# Patient Record
Sex: FEMALE | Race: OTHER | NOT HISPANIC OR LATINO
[De-identification: names, ages, dates, MRNs, and addresses within clinical notes are randomized per-mention and may not be internally consistent; named-entity substitution may affect disease eponyms.]

---

## 2023-05-18 ENCOUNTER — NON-APPOINTMENT (OUTPATIENT)
Age: 83
End: 2023-05-18

## 2023-05-18 ENCOUNTER — APPOINTMENT (OUTPATIENT)
Dept: OPHTHALMOLOGY | Facility: CLINIC | Age: 83
End: 2023-05-18
Payer: MEDICARE

## 2023-05-18 PROCEDURE — 92004 COMPRE OPH EXAM NEW PT 1/>: CPT

## 2023-06-06 ENCOUNTER — INPATIENT (INPATIENT)
Facility: HOSPITAL | Age: 83
LOS: 15 days | Discharge: HOME HEALTH | End: 2023-06-22
Attending: HOSPITALIST | Admitting: HOSPITALIST
Payer: MEDICARE

## 2023-06-06 VITALS
RESPIRATION RATE: 20 BRPM | SYSTOLIC BLOOD PRESSURE: 195 MMHG | TEMPERATURE: 98 F | DIASTOLIC BLOOD PRESSURE: 84 MMHG | HEART RATE: 85 BPM | OXYGEN SATURATION: 100 %

## 2023-06-06 DIAGNOSIS — Z90.49 ACQUIRED ABSENCE OF OTHER SPECIFIED PARTS OF DIGESTIVE TRACT: Chronic | ICD-10-CM

## 2023-06-06 DIAGNOSIS — N19 UNSPECIFIED KIDNEY FAILURE: ICD-10-CM

## 2023-06-06 DIAGNOSIS — Z90.710 ACQUIRED ABSENCE OF BOTH CERVIX AND UTERUS: Chronic | ICD-10-CM

## 2023-06-06 DIAGNOSIS — N17.9 ACUTE KIDNEY FAILURE, UNSPECIFIED: ICD-10-CM

## 2023-06-06 LAB
ALBUMIN SERPL ELPH-MCNC: 3.7 G/DL — SIGNIFICANT CHANGE UP (ref 3.3–5)
ALP SERPL-CCNC: 73 U/L — SIGNIFICANT CHANGE UP (ref 40–120)
ALT FLD-CCNC: 5 U/L — SIGNIFICANT CHANGE UP (ref 4–33)
ANION GAP SERPL CALC-SCNC: 23 MMOL/L — HIGH (ref 7–14)
ANION GAP SERPL CALC-SCNC: 24 MMOL/L — HIGH (ref 7–14)
AST SERPL-CCNC: 14 U/L — SIGNIFICANT CHANGE UP (ref 4–32)
BASE EXCESS BLDV CALC-SCNC: -10.3 MMOL/L — LOW (ref -2–3)
BASE EXCESS BLDV CALC-SCNC: -10.9 MMOL/L — LOW (ref -2–3)
BASOPHILS # BLD AUTO: 0.05 K/UL — SIGNIFICANT CHANGE UP (ref 0–0.2)
BASOPHILS NFR BLD AUTO: 0.5 % — SIGNIFICANT CHANGE UP (ref 0–2)
BILIRUB SERPL-MCNC: <0.2 MG/DL — SIGNIFICANT CHANGE UP (ref 0.2–1.2)
BLOOD GAS VENOUS COMPREHENSIVE RESULT: SIGNIFICANT CHANGE UP
BLOOD GAS VENOUS COMPREHENSIVE RESULT: SIGNIFICANT CHANGE UP
BUN SERPL-MCNC: 83 MG/DL — HIGH (ref 7–23)
BUN SERPL-MCNC: 83 MG/DL — HIGH (ref 7–23)
CALCIUM SERPL-MCNC: 8.5 MG/DL — SIGNIFICANT CHANGE UP (ref 8.4–10.5)
CALCIUM SERPL-MCNC: 8.9 MG/DL — SIGNIFICANT CHANGE UP (ref 8.4–10.5)
CHLORIDE BLDV-SCNC: 103 MMOL/L — SIGNIFICANT CHANGE UP (ref 96–108)
CHLORIDE BLDV-SCNC: 106 MMOL/L — SIGNIFICANT CHANGE UP (ref 96–108)
CHLORIDE SERPL-SCNC: 101 MMOL/L — SIGNIFICANT CHANGE UP (ref 98–107)
CHLORIDE SERPL-SCNC: 102 MMOL/L — SIGNIFICANT CHANGE UP (ref 98–107)
CO2 BLDV-SCNC: 18.5 MMOL/L — LOW (ref 22–26)
CO2 BLDV-SCNC: 19 MMOL/L — LOW (ref 22–26)
CO2 SERPL-SCNC: 14 MMOL/L — LOW (ref 22–31)
CO2 SERPL-SCNC: 15 MMOL/L — LOW (ref 22–31)
CREAT SERPL-MCNC: 16.24 MG/DL — HIGH (ref 0.5–1.3)
CREAT SERPL-MCNC: 16.77 MG/DL — HIGH (ref 0.5–1.3)
EGFR: 2 ML/MIN/1.73M2 — LOW
EGFR: 2 ML/MIN/1.73M2 — LOW
EOSINOPHIL # BLD AUTO: 0.19 K/UL — SIGNIFICANT CHANGE UP (ref 0–0.5)
EOSINOPHIL NFR BLD AUTO: 2 % — SIGNIFICANT CHANGE UP (ref 0–6)
GAS PNL BLDV: 135 MMOL/L — LOW (ref 136–145)
GAS PNL BLDV: 138 MMOL/L — SIGNIFICANT CHANGE UP (ref 136–145)
GAS PNL BLDV: SIGNIFICANT CHANGE UP
GLUCOSE BLDV-MCNC: 92 MG/DL — SIGNIFICANT CHANGE UP (ref 70–99)
GLUCOSE BLDV-MCNC: 97 MG/DL — SIGNIFICANT CHANGE UP (ref 70–99)
GLUCOSE SERPL-MCNC: 95 MG/DL — SIGNIFICANT CHANGE UP (ref 70–99)
GLUCOSE SERPL-MCNC: 98 MG/DL — SIGNIFICANT CHANGE UP (ref 70–99)
HCO3 BLDV-SCNC: 17 MMOL/L — LOW (ref 22–29)
HCO3 BLDV-SCNC: 18 MMOL/L — LOW (ref 22–29)
HCT VFR BLD CALC: 29.9 % — LOW (ref 34.5–45)
HCT VFR BLD CALC: 31.6 % — LOW (ref 34.5–45)
HCT VFR BLDA CALC: 31 % — LOW (ref 34.5–46.5)
HCT VFR BLDA CALC: 41 % — SIGNIFICANT CHANGE UP (ref 34.5–46.5)
HGB BLD CALC-MCNC: 10.4 G/DL — LOW (ref 11.7–16.1)
HGB BLD CALC-MCNC: 13.8 G/DL — SIGNIFICANT CHANGE UP (ref 11.7–16.1)
HGB BLD-MCNC: 10 G/DL — LOW (ref 11.5–15.5)
HGB BLD-MCNC: 10.3 G/DL — LOW (ref 11.5–15.5)
IANC: 6.97 K/UL — SIGNIFICANT CHANGE UP (ref 1.8–7.4)
IMM GRANULOCYTES NFR BLD AUTO: 0.6 % — SIGNIFICANT CHANGE UP (ref 0–0.9)
LACTATE BLDV-MCNC: 1 MMOL/L — SIGNIFICANT CHANGE UP (ref 0.5–2)
LACTATE BLDV-MCNC: 1.1 MMOL/L — SIGNIFICANT CHANGE UP (ref 0.5–2)
LIDOCAIN IGE QN: 870 U/L — HIGH (ref 7–60)
LYMPHOCYTES # BLD AUTO: 1.63 K/UL — SIGNIFICANT CHANGE UP (ref 1–3.3)
LYMPHOCYTES # BLD AUTO: 16.8 % — SIGNIFICANT CHANGE UP (ref 13–44)
MAGNESIUM SERPL-MCNC: 2.5 MG/DL — SIGNIFICANT CHANGE UP (ref 1.6–2.6)
MCHC RBC-ENTMCNC: 29.8 PG — SIGNIFICANT CHANGE UP (ref 27–34)
MCHC RBC-ENTMCNC: 30.4 PG — SIGNIFICANT CHANGE UP (ref 27–34)
MCHC RBC-ENTMCNC: 32.6 GM/DL — SIGNIFICANT CHANGE UP (ref 32–36)
MCHC RBC-ENTMCNC: 33.4 GM/DL — SIGNIFICANT CHANGE UP (ref 32–36)
MCV RBC AUTO: 90.9 FL — SIGNIFICANT CHANGE UP (ref 80–100)
MCV RBC AUTO: 91.3 FL — SIGNIFICANT CHANGE UP (ref 80–100)
MONOCYTES # BLD AUTO: 0.82 K/UL — SIGNIFICANT CHANGE UP (ref 0–0.9)
MONOCYTES NFR BLD AUTO: 8.4 % — SIGNIFICANT CHANGE UP (ref 2–14)
NEUTROPHILS # BLD AUTO: 6.97 K/UL — SIGNIFICANT CHANGE UP (ref 1.8–7.4)
NEUTROPHILS NFR BLD AUTO: 71.7 % — SIGNIFICANT CHANGE UP (ref 43–77)
NRBC # BLD: 0 /100 WBCS — SIGNIFICANT CHANGE UP (ref 0–0)
NRBC # BLD: 0 /100 WBCS — SIGNIFICANT CHANGE UP (ref 0–0)
NRBC # FLD: 0 K/UL — SIGNIFICANT CHANGE UP (ref 0–0)
NRBC # FLD: 0 K/UL — SIGNIFICANT CHANGE UP (ref 0–0)
OB PNL STL: POSITIVE
PCO2 BLDV: 43 MMHG — SIGNIFICANT CHANGE UP (ref 39–52)
PCO2 BLDV: 49 MMHG — SIGNIFICANT CHANGE UP (ref 39–52)
PH BLDV: 7.16 — LOW (ref 7.32–7.43)
PH BLDV: 7.21 — LOW (ref 7.32–7.43)
PHOSPHATE SERPL-MCNC: 9 MG/DL — HIGH (ref 2.5–4.5)
PLATELET # BLD AUTO: 245 K/UL — SIGNIFICANT CHANGE UP (ref 150–400)
PLATELET # BLD AUTO: 284 K/UL — SIGNIFICANT CHANGE UP (ref 150–400)
PO2 BLDV: 39 MMHG — SIGNIFICANT CHANGE UP (ref 25–45)
PO2 BLDV: 43 MMHG — SIGNIFICANT CHANGE UP (ref 25–45)
POTASSIUM BLDV-SCNC: 4.8 MMOL/L — SIGNIFICANT CHANGE UP (ref 3.5–5.1)
POTASSIUM BLDV-SCNC: 4.9 MMOL/L — SIGNIFICANT CHANGE UP (ref 3.5–5.1)
POTASSIUM SERPL-MCNC: 5 MMOL/L — SIGNIFICANT CHANGE UP (ref 3.5–5.3)
POTASSIUM SERPL-MCNC: 5.1 MMOL/L — SIGNIFICANT CHANGE UP (ref 3.5–5.3)
POTASSIUM SERPL-SCNC: 5 MMOL/L — SIGNIFICANT CHANGE UP (ref 3.5–5.3)
POTASSIUM SERPL-SCNC: 5.1 MMOL/L — SIGNIFICANT CHANGE UP (ref 3.5–5.3)
PROT SERPL-MCNC: 6.6 G/DL — SIGNIFICANT CHANGE UP (ref 6–8.3)
RBC # BLD: 3.29 M/UL — LOW (ref 3.8–5.2)
RBC # BLD: 3.46 M/UL — LOW (ref 3.8–5.2)
RBC # FLD: 11.9 % — SIGNIFICANT CHANGE UP (ref 10.3–14.5)
RBC # FLD: 12 % — SIGNIFICANT CHANGE UP (ref 10.3–14.5)
SAO2 % BLDV: 55.2 % — LOW (ref 67–88)
SAO2 % BLDV: 69.4 % — SIGNIFICANT CHANGE UP (ref 67–88)
SODIUM SERPL-SCNC: 139 MMOL/L — SIGNIFICANT CHANGE UP (ref 135–145)
SODIUM SERPL-SCNC: 140 MMOL/L — SIGNIFICANT CHANGE UP (ref 135–145)
WBC # BLD: 10.61 K/UL — HIGH (ref 3.8–10.5)
WBC # BLD: 9.72 K/UL — SIGNIFICANT CHANGE UP (ref 3.8–10.5)
WBC # FLD AUTO: 10.61 K/UL — HIGH (ref 3.8–10.5)
WBC # FLD AUTO: 9.72 K/UL — SIGNIFICANT CHANGE UP (ref 3.8–10.5)

## 2023-06-06 PROCEDURE — 99285 EMERGENCY DEPT VISIT HI MDM: CPT

## 2023-06-06 PROCEDURE — 99223 1ST HOSP IP/OBS HIGH 75: CPT

## 2023-06-06 PROCEDURE — 74176 CT ABD & PELVIS W/O CONTRAST: CPT | Mod: 26

## 2023-06-06 PROCEDURE — 99497 ADVNCD CARE PLAN 30 MIN: CPT | Mod: 25

## 2023-06-06 RX ORDER — SODIUM CHLORIDE 9 MG/ML
1000 INJECTION INTRAMUSCULAR; INTRAVENOUS; SUBCUTANEOUS ONCE
Refills: 0 | Status: DISCONTINUED | OUTPATIENT
Start: 2023-06-06 | End: 2023-06-06

## 2023-06-06 RX ORDER — SODIUM BICARBONATE 1 MEQ/ML
0.12 SYRINGE (ML) INTRAVENOUS
Qty: 150 | Refills: 0 | Status: DISCONTINUED | OUTPATIENT
Start: 2023-06-06 | End: 2023-06-08

## 2023-06-06 RX ORDER — ACETAMINOPHEN 500 MG
650 TABLET ORAL EVERY 6 HOURS
Refills: 0 | Status: DISCONTINUED | OUTPATIENT
Start: 2023-06-06 | End: 2023-06-22

## 2023-06-06 RX ORDER — SODIUM CHLORIDE 9 MG/ML
1000 INJECTION INTRAMUSCULAR; INTRAVENOUS; SUBCUTANEOUS ONCE
Refills: 0 | Status: COMPLETED | OUTPATIENT
Start: 2023-06-06 | End: 2023-06-06

## 2023-06-06 RX ORDER — ONDANSETRON 8 MG/1
4 TABLET, FILM COATED ORAL EVERY 6 HOURS
Refills: 0 | Status: DISCONTINUED | OUTPATIENT
Start: 2023-06-06 | End: 2023-06-22

## 2023-06-06 RX ORDER — ONDANSETRON 8 MG/1
4 TABLET, FILM COATED ORAL ONCE
Refills: 0 | Status: COMPLETED | OUTPATIENT
Start: 2023-06-06 | End: 2023-06-06

## 2023-06-06 RX ORDER — PANTOPRAZOLE SODIUM 20 MG/1
80 TABLET, DELAYED RELEASE ORAL ONCE
Refills: 0 | Status: COMPLETED | OUTPATIENT
Start: 2023-06-06 | End: 2023-06-06

## 2023-06-06 RX ADMIN — Medication 75 MEQ/KG/HR: at 20:34

## 2023-06-06 RX ADMIN — SODIUM CHLORIDE 1000 MILLILITER(S): 9 INJECTION INTRAMUSCULAR; INTRAVENOUS; SUBCUTANEOUS at 16:28

## 2023-06-06 RX ADMIN — ONDANSETRON 4 MILLIGRAM(S): 8 TABLET, FILM COATED ORAL at 16:28

## 2023-06-06 RX ADMIN — PANTOPRAZOLE SODIUM 80 MILLIGRAM(S): 20 TABLET, DELAYED RELEASE ORAL at 22:49

## 2023-06-06 NOTE — H&P ADULT - PROBLEM SELECTOR PLAN 5
Hgb 10.3 initially, 10 on repeat. Likely multifactorial, including from possible GIB, anemia of chronic disease, and severe renal failure.  - Plan as above for GIB  - Check iron studies, folate, vitamin B12, retic count, LDH, and haptoglobin Hgb 10.3 initially, 10 on repeat. Likely multifactorial, including from possible GIB, anemia of chronic disease, and severe renal failure.  - Plan as above for GIB  - Check iron studies, folate, vitamin B12, retic count, LDH, and haptoglobin  - Pt reporting new ALLEN, suspect 2/2 anemia. Pt not grossly fluid overloaded on exam despite severe renal failure. Will check TTE to evaluate for structural heart failure.

## 2023-06-06 NOTE — ED PROVIDER NOTE - CARE PLAN
1 Principal Discharge DX:	Renal failure, acute  Secondary Diagnosis:	GI bleed  Secondary Diagnosis:	Anemia

## 2023-06-06 NOTE — CONSULT NOTE ADULT - PROBLEM SELECTOR RECOMMENDATION 9
Pt with advanced kidney failure in setting of GI fluid losses (diarrhea), decreased PO intake, meds (Valsartan-HCTZ) and chronic NSAIDs use. Exact duration and etiology of kidney failure remains unknown. Pt presented with melanotic stool/diarrhea past 1 week. SCr significantly elevated at 16.77 on ER labs. No prior labs available to review. No urine studies available. Baca placed in ER with no urine return noted. Pt with hx of chronic NSAIDs use. Pt likely with ?advanced CKD. Recommend check CT scan without contrast to rule out obstructive uropathy/reversible cause. Recommend obtain UA, urine lytes and UPCR. Check kidney and bladder US with renal dopplers to rule out BENJAMÍN. Pt also noted to be hypertensive and anemic. Check LDH, and haptoglobin. Check ALESIA, P-ANCA, C-ANCA, Anti-GBM ab, HBsAg, Hep C antibody, HIV, Parvovirus, C3, C4, SPEP, serum immunofixation, serum free light chains, anti PLA2R to rule out secondary causes of kidney failure. Pt. with uremic symptoms and significant kidney failure. Discussed at length with pt. need for RRT given clinical presentation/uremic symptoms. Pt agrees and consented for RRT/HD. HD consent obtained and kept on file. Repeat BMP. Will wait for CT scan results. Plan for initiation of HD tomorrow AM (6/7/23) if no reversible/identifiable cause noted on CT scan. Will need to establish vascular/HD access prior to HD. Continue IV bicarbonate infusion for now. Strict I/O. Avoid ACEi/ARBS/IV contrast/NSAIDs/Nephrotoxins. Dose meds as per egfr. Monitor labs.     Case discussed with on call attending. Dr. HARPAL Morejon. Pt with advanced kidney failure in setting of GI fluid losses (diarrhea), decreased PO intake, meds (Valsartan-HCTZ) and chronic NSAIDs use. Exact duration and etiology of kidney failure remains unknown. Pt presented with melanotic stool/diarrhea past 1 week. SCr significantly elevated at 16.77 on ER labs. No prior labs available to review. No urine studies available. Baca placed in ER with no urine return noted. Pt with hx of chronic NSAIDs use. Pt likely with ?advanced CKD. Recommend check CT scan without contrast to rule out obstructive uropathy/reversible cause. Recommend obtain UA, urine lytes and UPCR. Check kidney and bladder US with renal dopplers to rule out BENJAMÍN. Pt also noted to be hypertensive and anemic. Check LDH, and haptoglobin. CT scan results with bilateral hydronephrosis. Urology consult note reviewed. IR consult for PCN placement noted. Pt. with uremic symptoms and significant kidney failure. Discussed at length with pt. need for RRT given clinical presentation/uremic symptoms. Pt agrees and consented for RRT/HD. HD consent obtained and kept on file. Plan for initiation of HD tomorrow AM (6/7/23) if no reversible/identifiable cause noted on CT scan. Will need to establish vascular/HD access prior to HD. Continue IV bicarbonate infusion for now. Strict I/O. Avoid ACEi/ARBS/IV contrast/NSAIDs/Nephrotoxins. Dose meds as per egfr. Monitor labs.     Case discussed with on call attending. Dr. HARPAL Morejon.

## 2023-06-06 NOTE — H&P ADULT - NSHPSOCIALHISTORY_GEN_ALL_CORE
Drinks alcohol socially.  Smoked cigarettes for 1 year at 21 yo.  Smoked marijuana for 2 years in mid to late 20s.  Grandson lives with pt.   Ambulates without any assistance.

## 2023-06-06 NOTE — ED ADULT NURSE NOTE - OBJECTIVE STATEMENT
patient received from day RN Vahe, patient alert and oriented x4 ambulatory, c/o diarrhea and nausea. patient is well appearing, no acute distress noted. 22 IV placed by day intake ZHENG Byers. pending bed assignment at this time.

## 2023-06-06 NOTE — H&P ADULT - PROBLEM SELECTOR PLAN 6
Lipase elevated to 870 Lipase elevated to 870. CTAP noncontrast showing unremarkable pancreas. Pt without any abdominal pain or tenderness on exam. Low suspicion for acute pancreatitis at this time, more likely in setting of severe renal failure and possibly from gastroenteritis.  - S/p 2L bolus of NS in ED. Will hold off on additional IVF for now, as pt not making any urine.  - Trend lipase for now

## 2023-06-06 NOTE — H&P ADULT - PROBLEM SELECTOR PLAN 1
Serum Cr 16.77 and BUN 83. Unknown baseline renal function, as no prior labs available. Duration of renal failure also unclear, but suspect pt with history of CKD given pt's daily use of Aleve for past 10 years. Likely now with KINJAL on CKD from obstructive uropathy, GI fluid losses (diarrhea), and poor PO intake.   - Renal consulted on admission, appreciate recs  - CTAP noncontrast obtained overnight and demonstrating moderate right-sided hydroureteronephrosis, with obstruction proximal to the UVJ in the region of a 3.6 x 2.9 x 4.6 cm bulbous soft tissue mass inseparable from the cervical remnant, mild left-sided hydroureteronephrosis to the level of the UVJ, and pelvic and RP lymphadenopathy concerning for metastatic disease  - Urology consult called overnight for possible ureteral stent placement. F/u official recs.  - IR consult also ordered on admission, as pt might require nephrostomy tube placement instead  - S/p Baca catheter insertion in ED. C/w Baca catheter for now.  - Per Renal, no need for urgent HD tonight, plan for initiation of HD tomorrow. Vascular or IR consult for Shiley placement  - Serum Cr 16.77 and BUN 83. Unknown baseline renal function, as no prior labs available. Duration of renal failure also unclear, but suspect pt with history of CKD given pt's daily use of Aleve for past 10 years. Likely now with KINJAL on CKD from obstructive uropathy, GI fluid losses (diarrhea), and poor PO intake.   - Renal consulted on admission, appreciate recs  - CTAP noncontrast obtained overnight and demonstrating moderate right-sided hydroureteronephrosis, with obstruction proximal to the UVJ in the region of a 3.6 x 2.9 x 4.6 cm bulbous soft tissue mass inseparable from the cervical remnant, mild left-sided hydroureteronephrosis to the level of the UVJ, and pelvic and RP lymphadenopathy concerning for metastatic disease  - Urology consult called overnight for possible ureteral stent placement. F/u official recs.  - IR consult also ordered on admission, as pt might require nephrostomy tube placement instead  - S/p Baca catheter insertion in ED. C/w Baca catheter for now.  - Per Renal, no need for urgent HD tonight, plan for initiation of HD tomorrow. Vascular or IR consult to be called in AM for Shiley placement.   - US abdominal dopplers ordered to r/o renal artery stenosis  - UA, urine lytes, and urine protein/Cr ratio ordered, but pt currently with no urine output  - Check ALESIA, p-ANCA, c-ANCA, anti-GBM Ab, HBsAg, Hep C Ab, HIV, Parvovirus, C3, C4, SPEP, serum immunofixation, serum free light chains, and anti-PLA2R Ab  - C/w bicarb infusion as below  - Monitor serum Cr and urine output with strict I&Os  - Avoid NSAIDs, ACEi/ARBs, contrast, and nephrotoxic agents   - Renally dose meds

## 2023-06-06 NOTE — ED PROVIDER NOTE - OBJECTIVE STATEMENT
83-year-old woman with PMH HTN, HLD, hypothyroidism on Synthroid, presenting due to nausea and diarrhea since Wednesday.  Patient denies abdominal pain despite with triage note states, said that the only reason she vomited once was because she choked on the antinausea tablets that were given to her.  She went to urgent care yesterday, they examined her and told her that since they did not have a CAT scan she should present to the ER which is why she is here today.  Initially the diarrhea was dark-colored, today started appearing to be more yellow and green.  Denies fever, chest pain, shortness of breath, recent antibiotic use, travel, illness. 83-year-old woman with PMH HTN, HLD, hypothyroidism on Synthroid, presenting due to nausea and diarrhea since Wednesday.  Patient denies abdominal pain despite with triage note states, said that the only reason she vomited once was because she choked on the antinausea tablets that were given to her.  She went to urgent care yesterday, they examined her and referred her to the ER which is why she is here today.  Initially the diarrhea was dark-colored, today started appearing to be more yellow and green.  Denies fever, chest pain, shortness of breath, recent antibiotic use, travel, illness. 83-year-old woman with PMH HTN, HLD, hypothyroidism on Synthroid, presenting due to nausea and diarrhea since Wednesday.  Patient denies abdominal pain despite with triage note states, said that the only reason she vomited once was because she choked on the antinausea tablets that were given to her.  She went to urgent care yesterday, they examined her and referred her to the ER which is why she is here today.  Initially the diarrhea was dark-colored, today started appearing to be more yellow and green. Has urinary incontinence at baseline, over the last few days appears to have worsened. Denies fever, chest pain, shortness of breath, recent antibiotic use, travel, illness.

## 2023-06-06 NOTE — CONSULT NOTE ADULT - SUBJECTIVE AND OBJECTIVE BOX
[facility] DIVISION OF KIDNEY DISEASES AND HYPERTENSION -- [phone]  -- INITIAL CONSULT NOTE  --------------------------------------------------------------------------------  HPI: 83-year-old female with PMH of HTN, HLD presented to [facility] with melanotic stools and diarrhea past 1 week. ER labs showed SCr of 16.77. Nephrology consulted for kidney failure.     Pt. seen and examined in ER. Pt gives hx of HTN for many years. Pt says she follows Dr. Saha (PCP), however has not seen him for more than 4 years. No prior labs/work up available to review. Pt says she takes Aleeve everyday for knee pain for more than 10 years. No personal or family hx of kidney problems. Pt also takes diuretics  (Valsartan-HCTZ) for HTN. Pt says she has not seen any physician since pandemic and has not had any blood test.     Pt. laying comfortably, not in distress. Pt says she started noticing dark stools past ~1 week associated with intermittent nausea. Pt gives hx of poor po intake and low energy levels. Pt says she gets chills sometimes. Pt says she noticed blood occasionally after urination. Pt says she last urinated today AM and has hx of urinary incontinence. Denies CP, SOB, dysuria. SCo2 low at 14 with ph of 7.16. Pt on IV bicarbonate infusion.    PAST HISTORY  --------------------------------------------------------------------------------  PAST MEDICAL & SURGICAL HISTORY:  HTN (hypertension)  HLD (hyperlipidemia)  Hypothyroid  History of cholecystectomy  History of appendectomy  H/O: hysterectomy    FAMILY HISTORY:    PAST SOCIAL HISTORY:    ALLERGIES & MEDICATIONS  --------------------------------------------------------------------------------  Allergies    No Known Allergies    Intolerances      Standing Inpatient Medications  pantoprazole  Injectable 80 milliGRAM(s) IV Push once  sodium bicarbonate  Infusion 0.125 mEq/kG/Hr IV Continuous <Continuous>    PRN Inpatient Medications      REVIEW OF SYSTEMS  --------------------------------------------------------------------------------  Gen: No fevers/chills, weakness+  Head/Eyes/Ears: No HA  Respiratory: No dyspnea, cough  CV: No chest pain  GI: see HPI  : see HPI  MSK: No edema  Skin: No rashes  Heme/onc: Anemia, hx of skin cancer (treated)    VITALS/PHYSICAL EXAM  --------------------------------------------------------------------------------  T(C): 36.6 (06-06-23 @ 21:09), Max: 36.8 (06-06-23 @ 13:00)  HR: 82 (06-06-23 @ 21:09) (78 - 85)  BP: 141/58 (06-06-23 @ 21:09) (141/58 - 195/84)  RR: 20 (06-06-23 @ 21:09) (16 - 20)  SpO2: 100% (06-06-23 @ 21:09) (97% - 100%)  Wt(kg): --    Weight (kg): 90 (06-06-23 @ 19:11)      Physical Exam:  	Gen: elderly female, resting, NAD  	HEENT: Anicteric  	Pulm: CTA B/L  	CV: S1S2+  	Abd: Soft, +BS    	Ext: No LE edema B/L  	Neuro: Awake             : Baca+ with no urine             Skin: Warm and dry    LABS/STUDIES  --------------------------------------------------------------------------------              10.3   9.72  >-----------<  284      [06-06-23 @ 16:20]              31.6     139  |  101  |  83  ----------------------------<  95      [06-06-23 @ 16:53]  5.1   |  14  |  16.77        Ca     8.9     [06-06-23 @ 16:53]    Creatinine Trend:  SCr 16.77 [06-06 @ 16:53] [facility] DIVISION OF KIDNEY DISEASES AND HYPERTENSION -- [phone]  -- INITIAL CONSULT NOTE  --------------------------------------------------------------------------------  HPI: 83-year-old female with PMH of HTN, HLD presented to [facility] with melanotic stools and diarrhea past 1 week. ER labs showed SCr of 16.77. Nephrology consulted for kidney failure.     Pt. seen and examined in ER. Pt gives hx of HTN for many years. Pt says she follows Dr. Saha (PCP), however has not seen him for more than 4 years. No prior labs/work up available to review. Pt says she takes Aleeve everyday for knee pain for more than 10 years. No personal or family hx of kidney problems. Pt also takes diuretics  (Valsartan-HCTZ) for HTN. Pt says she has not seen any physician since COVID-19 pandemic and has not had any blood test recently.     Pt. laying comfortably in bed during evaluation. Pt says she started noticing dark stools past ~1 week associated with intermittent nausea. Pt gives hx of poor po intake and low energy levels. Pt says she gets chills sometimes. Pt says she noticed blood occasionally after urination. Pt says she last urinated today AM and has hx of urinary incontinence. Denies CP, SOB, dysuria. SCo2 low at 14 with ph of 7.16. Pt on IV bicarbonate infusion.    PAST HISTORY  --------------------------------------------------------------------------------  PAST MEDICAL & SURGICAL HISTORY:  HTN (hypertension)  HLD (hyperlipidemia)  Hypothyroid  History of cholecystectomy  History of appendectomy  H/O: hysterectomy    FAMILY HISTORY:    PAST SOCIAL HISTORY:    ALLERGIES & MEDICATIONS  --------------------------------------------------------------------------------  Allergies    No Known Allergies    Intolerances      Standing Inpatient Medications  pantoprazole  Injectable 80 milliGRAM(s) IV Push once  sodium bicarbonate  Infusion 0.125 mEq/kG/Hr IV Continuous <Continuous>    PRN Inpatient Medications      REVIEW OF SYSTEMS  --------------------------------------------------------------------------------  Gen: No fevers/chills, weakness+  Head/Eyes/Ears: No HA  Respiratory: No dyspnea, cough  CV: No chest pain  GI: see HPI  : see HPI  MSK: No edema  Skin: No rashes  Heme/onc: Anemia, hx of skin cancer (treated)    VITALS/PHYSICAL EXAM  --------------------------------------------------------------------------------  T(C): 36.6 (06-06-23 @ 21:09), Max: 36.8 (06-06-23 @ 13:00)  HR: 82 (06-06-23 @ 21:09) (78 - 85)  BP: 141/58 (06-06-23 @ 21:09) (141/58 - 195/84)  RR: 20 (06-06-23 @ 21:09) (16 - 20)  SpO2: 100% (06-06-23 @ 21:09) (97% - 100%)  Wt(kg): --    Weight (kg): 90 (06-06-23 @ 19:11)      Physical Exam:  	Gen: elderly female, resting, NAD  	HEENT: Anicteric  	Pulm: CTA B/L  	CV: S1S2+  	Abd: Soft, +BS    	Ext: No LE edema B/L  	Neuro: Awake             : Baca+ with no urine             Skin: Warm and dry    LABS/STUDIES  --------------------------------------------------------------------------------              10.3   9.72  >-----------<  284      [06-06-23 @ 16:20]              31.6     139  |  101  |  83  ----------------------------<  95      [06-06-23 @ 16:53]  5.1   |  14  |  16.77        Ca     8.9     [06-06-23 @ 16:53]    Creatinine Trend:  SCr 16.77 [06-06 @ 16:53] [facility] DIVISION OF KIDNEY DISEASES AND HYPERTENSION -- [phone]  -- INITIAL CONSULT NOTE  --------------------------------------------------------------------------------  HPI: 83-year-old female with PMH of HTN, HLD presented to [facility] with melanotic stools and diarrhea past 1 week. ER labs showed SCr of 16.77. Nephrology consulted for kidney failure.     Pt. seen and examined in ER. Pt gives hx of HTN for many years. Pt says she follows Dr. Saha (PCP), however has not seen him for more than 4 years. No prior labs/work up available to review. Pt says she takes Aleeve everyday for knee pain for more than 10 years. No personal or family hx of kidney problems. Pt also takes diuretics  (Valsartan-HCTZ) for HTN. Pt says she has not seen any physician since COVID-19 pandemic and has not had any blood test recently.     Pt. laying comfortably in bed during evaluation. Pt says she started noticing dark stools past ~1 week associated with intermittent nausea. Pt gives hx of poor po intake and low energy levels. Pt says she gets chills sometimes. Pt says she noticed blood occasionally after urination. Pt says she last urinated today AM and has hx of urinary incontinence. Denies CP, SOB, dysuria. SCo2 low at 14 with ph of 7.16. Pt on IV bicarbonate infusion.    PAST HISTORY  --------------------------------------------------------------------------------  PAST MEDICAL & SURGICAL HISTORY:  HTN (hypertension)  HLD (hyperlipidemia)  Hypothyroid  History of cholecystectomy  History of appendectomy  H/O: hysterectomy    FAMILY HISTORY: Reviewed and non contributory    PAST SOCIAL HISTORY: No smoking, drugs or alcohol use    ALLERGIES & MEDICATIONS  --------------------------------------------------------------------------------  Allergies    No Known Allergies    Intolerances      Standing Inpatient Medications  pantoprazole  Injectable 80 milliGRAM(s) IV Push once  sodium bicarbonate  Infusion 0.125 mEq/kG/Hr IV Continuous <Continuous>    PRN Inpatient Medications      REVIEW OF SYSTEMS  --------------------------------------------------------------------------------  Gen: No fevers/chills, weakness+  Head/Eyes/Ears: No HA  Respiratory: No dyspnea, cough  CV: No chest pain  GI: see HPI  : see HPI  MSK: No edema  Skin: No rashes  Heme/onc: Anemia, hx of skin cancer (treated)    VITALS/PHYSICAL EXAM  --------------------------------------------------------------------------------  T(C): 36.6 (06-06-23 @ 21:09), Max: 36.8 (06-06-23 @ 13:00)  HR: 82 (06-06-23 @ 21:09) (78 - 85)  BP: 141/58 (06-06-23 @ 21:09) (141/58 - 195/84)  RR: 20 (06-06-23 @ 21:09) (16 - 20)  SpO2: 100% (06-06-23 @ 21:09) (97% - 100%)  Wt(kg): --    Weight (kg): 90 (06-06-23 @ 19:11)      Physical Exam:  	Gen: elderly female, resting, NAD  	HEENT: Anicteric  	Pulm: CTA B/L  	CV: S1S2+  	Abd: Soft, +BS    	Ext: No LE edema B/L  	Neuro: Awake             : Baca+ with no urine             Skin: Warm and dry    LABS/STUDIES  --------------------------------------------------------------------------------              10.3   9.72  >-----------<  284      [06-06-23 @ 16:20]              31.6     139  |  101  |  83  ----------------------------<  95      [06-06-23 @ 16:53]  5.1   |  14  |  16.77        Ca     8.9     [06-06-23 @ 16:53]    Creatinine Trend:  SCr 16.77 [06-06 @ 16:53]

## 2023-06-06 NOTE — H&P ADULT - PROBLEM SELECTOR PLAN 2
Serum HCO3 14 with pH 7.16 on admission. In setting of severe renal failure.   - Pt started on bicarb infusion at 75 cc/hr as per Renal recs. C/w bicarb infusion overnight.  - Repeat BMP and VBG checked overnight, with slight improvement in acidosis, with serum HCO3 15 and pH 7.21  - Monitor BMP and VBG at least q8hrs for now

## 2023-06-06 NOTE — H&P ADULT - NSHPPHYSICALEXAM_GEN_ALL_CORE
Vital Signs Last 24 Hrs  T(C): 36.8 (07 Jun 2023 03:51), Max: 36.8 (06 Jun 2023 13:00)  T(F): 98.2 (07 Jun 2023 03:51), Max: 98.3 (06 Jun 2023 13:00)  HR: 85 (07 Jun 2023 03:51) (78 - 85)  BP: 159/71 (07 Jun 2023 03:51) (141/58 - 195/84)  BP(mean): --  RR: 18 (07 Jun 2023 03:51) (16 - 20)  SpO2: 97% (07 Jun 2023 03:51) (97% - 100%)    PHYSICAL EXAM:  General: Awake and alert.  No acute distress.  Head: Normocephalic, atraumatic.    Eyes: PERRL.  EOMI.  No scleral icterus.  No conjunctival pallor.  Mouth: Moist MM.  No oropharyngeal exudates.    Neck: Supple.  Full range of motion.  No JVD.  No LAD.  No thyromegaly.  Trachea midline.    Heart: RRR.  Normal S1 and S2.  No murmurs, rubs, or gallops.  No LE edema b/l.   Lungs: Nonlabored breathing.  Good inspiratory effort.  CTAB.  No wheezes, crackles, or rhonchi.    Abdomen: BS+, soft, nontender with no rebound or guarding, nondistended.  No hepatomegaly.   Genitourinary: Baca catheter in place with no urine in Baca bag.  Skin: Warm and dry.  No rashes.  Extremities: No cyanosis.  2+ peripheral pulses b/l.  Musculoskeletal: No joint deformities.  No spinal or paraspinal tenderness.  Neuro: A&Ox3.  CN II-XII intact.  5/5 motor strength in UE and LE b/l.  Tactile sensation intact in UE and LE b/l.  No pronator drift b/l.  No focal deficits.

## 2023-06-06 NOTE — H&P ADULT - TIME BILLING
I had a face to face encounter with this patient. I spent 90 total minutes on chart review, bedside interview and physical exam, orders, interpretation of results, documentation, and coordination of care for this patient.

## 2023-06-06 NOTE — H&P ADULT - PROBLEM SELECTOR PLAN 4
Pt with episodes of diarrhea for past ~1 week, with black stools/melena for 3 days, now with liquid mustard-yellow diarrhea. Pt with history of taking Aleve 2 tabs daily for the past 10 years in addition to baby ASA daily. Pt with episodes of diarrhea for past ~1 week, with black stools/melena for 3 days, now with liquid mustard-yellow diarrhea. Pt with history of taking Aleve 2 tabs daily for the past 10 years in addition to baby ASA daily. Concerning for upper GIB 2/2 PUD, but can be from viral/bacterial gastroenteritis. Rectal exam with no gross blood or melena, FOBT positive.  - IV Protonix 80 mg x1 ordered stat. C/w IV Protonix 40 mg BID.  - Check C diff PCR, GI PCR, and stool cxs  - Repeat CBC overnight with H/H remaining stable with Hgb 10  - Monitor CBC at least daily, more frequently if recurrence of melena  - Monitor BMs  - Keep NPO for now   - Keep active T&S  - GI consult to be called during admission once pt more medically stable Pt with episodes of diarrhea for past ~1 week, with black stools/melena for 3 days, now with liquid mustard-yellow diarrhea. Pt with history of taking Aleve 2 tabs daily for the past 10 years in addition to baby ASA daily. Concerning for upper GIB 2/2 PUD, but can be from viral/bacterial gastroenteritis. Rectal exam with no gross blood or melena, FOBT positive.  - IV Protonix 80 mg x1 ordered stat. C/w IV Protonix 40 mg BID.  - Check C diff PCR, GI PCR, and stool cxs  - Repeat CBC overnight with H/H remaining stable with Hgb 10  - Monitor CBC at least daily, more frequently if recurrence of melena  - Monitor BMs  - Keep NPO for now pending IR eval  - Keep active T&S  - Hold pt's home baby ASA  - GI consult to be called during admission once pt more medically stable

## 2023-06-06 NOTE — ED PROVIDER NOTE - NSICDXPASTSURGICALHX_GEN_ALL_CORE_FT
PAST SURGICAL HISTORY:  H/O: hysterectomy     History of appendectomy     History of cholecystectomy

## 2023-06-06 NOTE — ED PROVIDER NOTE - CLINICAL SUMMARY MEDICAL DECISION MAKING FREE TEXT BOX
Willi, PGY2 - 83-year-old woman presenting with vomiting and diarrhea for the last week.  No instigating factors that she reports, such as antibiotic use or travel, consider gastroenteritis considering the patient is also nauseous during this time.  Labs, meds, reassess.  Will not pursue CAT scan at this time considering patient has no abdominal pain, is not tender on exam, vital signs are otherwise well and are not indicative of sepsis. *The above represents an initial assessment/impression. Please refer to progress notes for potential changes in patient clinical course* Willi, PGY2 - 83-year-old woman presenting with vomiting and diarrhea for the last week.  No instigating factors that she reports, such as antibiotic use or travel, consider gastroenteritis considering the patient is also nauseous during this time.  Labs, meds, reassess.  Will not pursue CAT scan at this time considering patient has no abdominal pain, is not tender on exam, vital signs are otherwise well and are not indicative of sepsis. *The above represents an initial assessment/impression. Please refer to progress notes for potential changes in patient clinical course*    Attending MD Jeffries.  Agree with above.  Pt is an 84 yo fem with presenting with v/d/n x 1 wk.  Planned labs, CTAP and reasssessment.  Initial labs concerning for acute renal failure of unclear exact etiology.  CTAP ordered and pending for eval of renal failure.  Abdomen soft, non-tender.  Do not suspect obstruction.  Signed out to incoming team pending results and admission.  Planned nephro consult.

## 2023-06-06 NOTE — H&P ADULT - NSHPLABSRESULTS_GEN_ALL_CORE
EKG personally reviewed.  SR with PACs at 72 bpm, no acute ischemic changes, low voltage QRS, QTc 402 ms.    Labs personally reviewed.                        10.0   10.61 )-----------( 245      ( 06 Jun 2023 21:55 )             29.9     06-06    140  |  102  |  83<H>  ----------------------------<  98  5.0   |  15<L>  |  16.24<H>    Ca    8.5      06 Jun 2023 21:55  Phos  9.0     06-06  Mg     2.50     06-06    TPro  6.6  /  Alb  3.7  /  TBili  <0.2  /  DBili  x   /  AST  14  /  ALT  5   /  AlkPhos  73  06-06    Imaging personally reviewed.  ACC: [id number] EXAM:  CT ABDOMEN AND PELVIS   ORDERED BY: DENNIS ANGEL   PROCEDURE DATE:  06/06/2023    FINDINGS:  Evaluation of the solid visceral organs and vasculature is limited without the administration of intravenous contrast.    LOWER CHEST: Trace right pleural effusion. Bibasilar subsegmental atelectasis.    LIVER: Unremarkable  BILE DUCTS: Unremarkable  GALLBLADDER: Cholecystectomy.  SPLEEN: Unremarkable  PANCREAS: Unremarkable  ADRENALS: Indeterminate 1.9 cm left adrenal nodule.  KIDNEYS/URETERS: Moderate right-sided hydroureteronephrosis, with obstruction proximal to the UVJ in the region of a 3.6 x 2.9 x 4.6 cm bulbous soft tissue mass inseparable from the cervical remnant. Mild left-sided hydroureteronephrosis to the level of the UVJ. No radiopaque nephrolithiasis nor ureterolithiasis. Mild bilateral perinephric edema.    BLADDER: Decompressed by Baca catheter with surrounding fat stranding  REPRODUCTIVE ORGANS: Hysterectomy with nodular soft tissue mass arising from the right aspect of the cervical remnant, detailed above    BOWEL: No bowel obstruction. Appendectomy. Fluid filled large colon compatible with a nonspecific diarrheal illness.  PERITONEUM: No free air.  VESSELS: Atherosclerotic changes.  RETROPERITONEUM/LYMPH NODES: Markedly enlarged bilateral obturator lymph nodes,, the largest on the right measures 3.2 x 2.1 cm. Left para-aortic and common iliac chain adenopathy is also present, the largest node in the left common iliac chain is 2.2 x 1.8 cm.  ABDOMINAL WALL: Postsurgical changes.  BONES: Degenerative changes.    IMPRESSION:    1. Moderate right-sided hydroureteronephrosis secondary to an obstructing soft tissue mass inseparable from the cervical remnant. Clinical workup to evaluate for primary neoplasm advised. Bulky bilateral pelvic lymph nodes and left retroperitoneal lymph node suspicious for metastatic disease.

## 2023-06-06 NOTE — ED PROVIDER NOTE - ATTENDING CONTRIBUTION TO CARE
Attending MD Jeffries:  I performed a history and physical exam of the patient and discussed their management with the resident. I reviewed the resident's note and agree with the documented findings and plan of care. My medical decision making and observations are found above.

## 2023-06-06 NOTE — H&P ADULT - ASSESSMENT
84 yo woman with history of HTN, HLD, hypothyroidism, and high-grade squamous intraepithelial lesion s/p MILVIA/BSO (2008) presents with nausea and diarrhea, including with melena, for past ~1 week. Found to be in acute renal failure 2/2 an obstructing soft tissue mass inseparable from the cervical remnant and proximal to the UVJ, also with possible GIB.

## 2023-06-06 NOTE — H&P ADULT - NSICDXPASTMEDICALHX_GEN_ALL_CORE_FT
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

## 2023-06-06 NOTE — H&P ADULT - CONVERSATION DETAILS
Goals of care was discussed at length with patient, who was A&Ox3 at the time of this discussion. Patient demonstrated adequate understanding of her current condition. The discussion included a conversation about patient's healthcare wishes and preferences for provision of treatment and life prolonging/resuscitative measures. Patient also confirmed that she has an advance directive, including designation of her 2 daughters, her only children, as her health care proxies. When code status was discussed with patient, she specified that in the past she had indicated that she desired to be DNR/DNI. However, patient now expressed that because she has grandchildren, she is not sure what she would prefer and would like to think about her code status some more before making a decision. Patient is full code at this patient. Patient is also agreeable to initiating hemodialysis if it is offered to her by nephrology.

## 2023-06-06 NOTE — H&P ADULT - HISTORY OF PRESENT ILLNESS
84 yo woman with history of HTN, HLD, and hypothyroidism presents with nausea and diarrhea for past ~1 week. Pt went to visit family in [place] recently, and while there, ate a "breaded tenderloin" last Monday that gave her an upset stomach afterwards. Pt, however, felt better by the next day and was able to fly back home to [place]  82 yo woman with history of HTN, HLD, and hypothyroidism presents with nausea and diarrhea for past ~1 week. Pt went to visit family in [place] recently, and while there, ate a "breaded tenderloin" last Monday that gave her an upset stomach afterwards. Pt, however, felt better by the next day and was able to fly back home to [place]. About 1-2 days after returning home, pt started having nausea and episodes of diarrhea with black stools, anywhere from 3-5 episodes a day. The black stools lasted for 3 days, with the diarrhea then becoming more liquid, almost mucus like, and mustard-yellow in color. Pt is now having 2-3 episodes of diarrhea daily. Pt denies any associated fevers, chills, chest pain, or abdominal pain. Pt reports 1 episode of NBNB emesis after she almost choked on a anti-emetic medication, no other occurrences of vomiting. Pt's sister ate breaded tenderloin with her last Monday and experienced a brief bout of GI symptoms but has now fully recovered. Pt's nausea occurs in the morning shortly after pt wakes up and at night before pt goes to bed. No accompanying dizziness, lightheadedness, headaches, vision changes, numbness, tingling, or gait disturbances. Drinking soda water alleviates the nausea. Pt notes that since her symptoms started a week ago, she has been getting short of breath after walking up the flight of stairs in her home, whereas prior to a week ago, she could complete 2 flights of stairs before becoming winded. Pt has also noticed that she has been urinating less than usual over the past few days. Pt takes a daily baby ASA, no AC, and has been taking 2 tabs of Aleve daily for the past 10 years, though pt stopped taking Aleve for the past 2 days.     Last colonoscopy was in March 2008 with finding of benign polyps. Has never had EGD.     In the   84 yo woman with history of HTN, HLD, hypothyroidism, and high-grade squamous intraepithelial lesion s/p MILVIA/BSO (2008) presents with nausea and diarrhea for past ~1 week. Pt went to visit family in [place] recently, and while there, ate a "breaded tenderloin" last Monday that gave her an upset stomach afterwards. Pt, however, felt better by the next day and was able to fly back home to [place]. About 1-2 days after returning home, pt started having nausea and episodes of diarrhea with black stools, anywhere from 3-5 episodes a day. The black stools lasted for 3 days, with the diarrhea then becoming more liquid, almost mucus like, and mustard-yellow in color. Pt is now having 2-3 episodes of diarrhea daily. Pt denies any associated fevers, chills, chest pain, or abdominal pain. Pt reports 1 episode of NBNB emesis after she almost choked on a anti-emetic medication, no other occurrences of vomiting. Pt's sister ate breaded tenderloin with her last Monday and experienced a brief bout of GI symptoms but has now fully recovered. Pt's nausea occurs in the morning shortly after pt wakes up and at night before pt goes to bed. No accompanying dizziness, lightheadedness, headaches, vision changes, numbness, tingling, or gait disturbances. Drinking soda water alleviates the nausea. Pt notes that since her symptoms started a week ago, she has been getting short of breath after walking up the flight of stairs in her home, whereas prior to a week ago, she could complete 2 flights of stairs before becoming winded. Pt has also noticed that she has been urinating less than usual over the past few days. Pt takes a daily baby ASA, no AC, and has been taking 2 tabs of Aleve daily for the past 10 years, though pt stopped taking Aleve for the past 2 days. Pt denies any chest pain, shortness of breath at rest, palpitations, or dysuria.     Last colonoscopy was in March 2008 with finding of benign polyps. Has never had an EGD.     In the ED,  T 97.9-98.3, HR 78-85, -195/73-84, RR 16-20, SpO2 % RA.  Hgb 10.3, serum Cr 16.77, BUN 83, HCO3 14, pH 7.16.  Lipase 870.  FOBT positive. 84 yo woman with history of HTN, HLD, hypothyroidism, and high-grade squamous intraepithelial lesion s/p MILVIA/BSO (2008) presents with nausea and diarrhea for past ~1 week. Pt went to visit family in [place] recently, and while there, ate a "breaded tenderloin" last Monday that gave her an upset stomach afterwards. Pt, however, felt better by the next day and was able to fly back home to [place]. About 1-2 days after returning home, pt started having nausea and episodes of diarrhea with black stools, anywhere from 3-5 episodes a day. The black stools lasted for 3 days, with the diarrhea then becoming more liquid, almost mucus like, and mustard-yellow in color. Pt is now having 2-3 episodes of diarrhea daily. Pt denies any associated fevers, chills, chest pain, or abdominal pain. Pt reports 1 episode of NBNB emesis after she almost choked on a anti-emetic medication, no other occurrences of vomiting. Pt's sister ate breaded tenderloin with her last Monday and experienced a brief bout of GI symptoms but has now fully recovered. Pt's nausea occurs in the morning shortly after pt wakes up and at night before pt goes to bed, does not interfere with PO intake. Pt, though, has been eating a BRAT diet since the diarrhea started. No accompanying dizziness, lightheadedness, headaches, vision changes, numbness, tingling, or gait disturbances. Drinking soda water alleviates the nausea. Pt notes that since her symptoms started a week ago, she has been getting short of breath after walking up the flight of stairs in her home, whereas prior to a week ago, she could complete 2 flights of stairs before becoming winded. Pt has also noticed that she has been urinating less than usual over the past few days. Pt takes a daily baby ASA, no AC, and has been taking 2 tabs of Aleve daily for the past 10 years for knee pain, though pt stopped taking Aleve for the past 2 days. Pt denies any chest pain, shortness of breath at rest, palpitations, or dysuria.     Last colonoscopy was in March 2008 with finding of benign polyps. Has never had an EGD.     In the ED,  T 97.9-98.3, HR 78-85, -195/73-84, RR 16-20, SpO2 % RA.  Hgb 10.3, serum Cr 16.77, BUN 83, HCO3 14, pH 7.16.  Lipase 870.  FOBT positive. 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 Pt went to visit family in [place] recently, and while there, ate a "breaded tenderloin" last Monday that gave her an upset stomach afterwards. Pt, however, felt better by the next day and was able to fly back home to [place]. About 1-2 days after returning home, pt started having nausea and episodes of diarrhea with black stools, anywhere from 3-5 episodes a day. The black stools lasted for 3 days, with the diarrhea then becoming more liquid, almost mucus like, and mustard-yellow in color. Pt is now having 2-3 episodes of diarrhea daily. Pt denies any associated fevers, chills, chest pain, or abdominal pain. Pt reports 1 episode of NBNB emesis after she almost choked on a anti-emetic medication, no other occurrences of vomiting. Pt's sister ate breaded tenderloin with her last Monday and experienced a brief bout of GI symptoms but has now fully recovered. Pt's nausea occurs in the morning shortly after pt wakes up and at night before pt goes to bed, does not interfere with PO intake. Pt, though, has been eating a BRAT diet since the diarrhea started. No accompanying dizziness, lightheadedness, headaches, vision changes, numbness, tingling, or gait disturbances. Drinking soda water alleviates the nausea. Pt notes that since her symptoms started a week ago, she has been getting short of breath after walking up the flight of stairs in her home, whereas prior to a week ago, she could complete 2 flights of stairs before becoming winded. Pt has also noticed that she has been urinating less than usual over the past few days. Pt takes a daily baby ASA, no AC, and has been taking 2 tabs of Aleve daily for the past 10 years for knee pain, though pt stopped taking Aleve for the past 2 days. Pt denies any chest pain, shortness of breath at rest, palpitations, or dysuria.     Last colonoscopy was in March 2008 with finding of benign polyps. Has never had an EGD.     In the ED,  T 97.9-98.3, HR 78-85, -195/73-84, RR 16-20, SpO2 % RA.  Hgb 10.3, serum Cr 16.77, BUN 83, HCO3 14, pH 7.16.  Lipase 870.  FOBT positive.

## 2023-06-06 NOTE — ED PROVIDER NOTE - PROGRESS NOTE DETAILS
Willi, PGY2 - positive guaiac. will order CT a/p for further characterization. patient aware and in agreement Willi, PGY2 - Cr >14, BUN 83, AG 24, patient in acute renal failure per these labs. no electrolyte abnormalities, +acidosis, nephrology paged and waiting for call-back. CT non con ordered, patient aware of findings and in agreement to stay. Willi, PGY2 - post cyril, spoke with nephrology Colby, recommending sodium bicarb infusion and in agreement with rest of plan. CT, UA, and sodium bicarb drip to be followed up by night team post weight insertion into chart. TBA to hospitalist shifta per night team

## 2023-06-06 NOTE — H&P ADULT - PROBLEM SELECTOR PLAN 10
- DVT ppx: Hold pharmacologic ppx for now given possible GIB. SCDs.   - Diet: NPO pending KARON reynolds

## 2023-06-06 NOTE — H&P ADULT - PROBLEM SELECTOR PLAN 3
CTAP noncontrast demonstrating moderate right-sided hydroureteronephrosis, with obstruction proximal to the UVJ in the region of a 3.6 x 2.9 x 4.6 cm bulbous soft tissue mass inseparable from the cervical remnant, mild left-sided hydroureteronephrosis to the level of the UVJ, and pelvic and RP lymphadenopathy concerning for metastatic disease. Pt with history of high-grade TAYLER of cervix requiring MILVIA/BSO in 2008.  - CT chest noncontrast ordered to r/o mets  - CTH noncontrast also ordered to r/o mets and given nausea, though of limited utility due to lack of contrast. Possibly defer CTH after initiation of HD to perform study with contrast.  - Gynecology paged x2 for consult overnight for possible biopsy of mass

## 2023-06-06 NOTE — H&P ADULT - PROBLEM SELECTOR PLAN 7
BPs elevated to 170s-190s systolic on admission, likely in setting of severe renal failure. Pt on valsartan/HCTZ at home.  - Hold pt's home valsartan and HCTZ in setting of severe renal failure  - Monitor VS q4hrs  - Will start CCB or hydralazine if SBPs persistently elevated >140s

## 2023-06-06 NOTE — ED PROVIDER NOTE - PHYSICAL EXAMINATION
Gen: NAD, AOx3, able to make needs known, non-toxic  Head: NCAT  HEENT: EOMI, normal conjunctiva  Lung: CTAB, no respiratory distress, no wheezes/rhonchi/rales B/L, speaking in full sentences  CV: RRR, no M/R/G, pulses bilaterally   Abd: soft, NTND, no guarding, no CVA tenderness  Rectal: No lesions visualized over the anus. +external hemorrhoids visualized, no internal hemorrhoids palpated on LYNDSEY. Stool sample collected and guaiac sent. Chaperone: Melia Galindo, PCA   MSK: no visible bony deformities  Neuro: No focal sensory or motor deficits  Skin: Warm, well perfused, no rash  Psych: normal affect

## 2023-06-06 NOTE — ED PROVIDER NOTE - NS ED ROS FT
GENERAL: No fever, no chills  EYES: No change in vision  HEENT: No trouble swallowing or speaking  CARDIAC: No chest pain  PULMONARY: No cough, no SOB  GI: No abdominal pain, +nausea, no vomiting, +diarrhea, no constipation  : No changes in urination  SKIN: No rashes  NEURO: No headache, no numbness  MSK: No joint pain  Otherwise as HPI or negative.

## 2023-06-06 NOTE — H&P ADULT - NSHPREVIEWOFSYSTEMS_GEN_ALL_CORE
Constitutional: No generalized weakness, fevers, chills, or weight loss  Eyes: No visual changes, double vision, or eye pain  Ears, Nose, Mouth, Throat: No runny nose, sinus pain, ear pain, tinnitus, sore throat, dysphagia, or odynophagia  Cardiovascular: No chest pain, palpitations, or LE edema  Respiratory: +Dyspnea on exertion. No cough, wheezing, or hemoptysis.  Gastrointestinal: +Nausea and diarrhea. +Vomiting x1 (now resolved). No abdominal pain, hematemesis, melena, or BRBPR.  Genitourinary: +Oliguric. No dysuria or hematuria.  Musculoskeletal: No neck pain or back pain. No joint pain, swelling, or decreased ROM.  Skin: No pruritus or rashes.  Neurologic: No syncope, seizures, headache, paresthesias, numbness, or limb weakness  Psychiatric: No depression, anxiety, difficulty concentrating, anhedonia, or lack of energy  Endocrine: No heat/cold intolerance, mood swings, sweats, polydipsia, or polyuria  Hematologic/lymphatic: No purpura, petechia, or prolonged or excessive bleeding after dental extraction / injury  Allergic/Immunologic: No anaphylaxis or allergic response to materials, foods, animals    Positives and pertinent negatives noted and all other systems negative.

## 2023-06-07 ENCOUNTER — RESULT REVIEW (OUTPATIENT)
Age: 83
End: 2023-06-07

## 2023-06-07 DIAGNOSIS — N13.9 OBSTRUCTIVE AND REFLUX UROPATHY, UNSPECIFIED: ICD-10-CM

## 2023-06-07 DIAGNOSIS — Z29.9 ENCOUNTER FOR PROPHYLACTIC MEASURES, UNSPECIFIED: ICD-10-CM

## 2023-06-07 DIAGNOSIS — E87.20 ACIDOSIS, UNSPECIFIED: ICD-10-CM

## 2023-06-07 DIAGNOSIS — K92.2 GASTROINTESTINAL HEMORRHAGE, UNSPECIFIED: ICD-10-CM

## 2023-06-07 DIAGNOSIS — M79.89 OTHER SPECIFIED SOFT TISSUE DISORDERS: ICD-10-CM

## 2023-06-07 DIAGNOSIS — E78.5 HYPERLIPIDEMIA, UNSPECIFIED: ICD-10-CM

## 2023-06-07 DIAGNOSIS — I10 ESSENTIAL (PRIMARY) HYPERTENSION: ICD-10-CM

## 2023-06-07 DIAGNOSIS — E03.9 HYPOTHYROIDISM, UNSPECIFIED: ICD-10-CM

## 2023-06-07 DIAGNOSIS — D64.9 ANEMIA, UNSPECIFIED: ICD-10-CM

## 2023-06-07 DIAGNOSIS — N17.9 ACUTE KIDNEY FAILURE, UNSPECIFIED: ICD-10-CM

## 2023-06-07 DIAGNOSIS — R74.8 ABNORMAL LEVELS OF OTHER SERUM ENZYMES: ICD-10-CM

## 2023-06-07 LAB
ANION GAP SERPL CALC-SCNC: 22 MMOL/L — HIGH (ref 7–14)
ANION GAP SERPL CALC-SCNC: 25 MMOL/L — HIGH (ref 7–14)
BASE EXCESS BLDV CALC-SCNC: -10.5 MMOL/L — LOW (ref -2–3)
BLD GP AB SCN SERPL QL: NEGATIVE — SIGNIFICANT CHANGE UP
BLOOD GAS VENOUS COMPREHENSIVE RESULT: SIGNIFICANT CHANGE UP
BUN SERPL-MCNC: 78 MG/DL — HIGH (ref 7–23)
BUN SERPL-MCNC: 84 MG/DL — HIGH (ref 7–23)
C DIFF BY PCR RESULT: SIGNIFICANT CHANGE UP
CALCIUM SERPL-MCNC: 8.4 MG/DL — SIGNIFICANT CHANGE UP (ref 8.4–10.5)
CALCIUM SERPL-MCNC: 8.5 MG/DL — SIGNIFICANT CHANGE UP (ref 8.4–10.5)
CHLORIDE BLDV-SCNC: 104 MMOL/L — SIGNIFICANT CHANGE UP (ref 96–108)
CHLORIDE SERPL-SCNC: 100 MMOL/L — SIGNIFICANT CHANGE UP (ref 98–107)
CHLORIDE SERPL-SCNC: 99 MMOL/L — SIGNIFICANT CHANGE UP (ref 98–107)
CO2 BLDV-SCNC: 16.8 MMOL/L — LOW (ref 22–26)
CO2 SERPL-SCNC: 15 MMOL/L — LOW (ref 22–31)
CO2 SERPL-SCNC: 18 MMOL/L — LOW (ref 22–31)
CREAT SERPL-MCNC: 16.3 MG/DL — HIGH (ref 0.5–1.3)
CREAT SERPL-MCNC: 16.51 MG/DL — HIGH (ref 0.5–1.3)
DIALYSIS INSTRUMENT RESULT - HEPATITIS B SURFACE ANTIGEN: NEGATIVE — SIGNIFICANT CHANGE UP
EGFR: 2 ML/MIN/1.73M2 — LOW
EGFR: 2 ML/MIN/1.73M2 — LOW
FERRITIN SERPL-MCNC: 151 NG/ML — HIGH (ref 15–150)
FOLATE SERPL-MCNC: 16.2 NG/ML — SIGNIFICANT CHANGE UP (ref 3.1–17.5)
GAS PNL BLDV: 138 MMOL/L — SIGNIFICANT CHANGE UP (ref 136–145)
GAS PNL BLDV: SIGNIFICANT CHANGE UP
GAS PNL BLDV: SIGNIFICANT CHANGE UP
GI PCR PANEL: SIGNIFICANT CHANGE UP
GLUCOSE BLDV-MCNC: 100 MG/DL — HIGH (ref 70–99)
GLUCOSE SERPL-MCNC: 102 MG/DL — HIGH (ref 70–99)
GLUCOSE SERPL-MCNC: 103 MG/DL — HIGH (ref 70–99)
HBV SURFACE AG SER-ACNC: SIGNIFICANT CHANGE UP
HCO3 BLDV-SCNC: 16 MMOL/L — LOW (ref 22–29)
HCT VFR BLD CALC: 25.8 % — LOW (ref 34.5–45)
HCT VFR BLD CALC: 28.4 % — LOW (ref 34.5–45)
HCT VFR BLDA CALC: 29 % — LOW (ref 34.5–46.5)
HCV AB S/CO SERPL IA: 0.06 S/CO — SIGNIFICANT CHANGE UP (ref 0–0.99)
HCV AB SERPL-IMP: SIGNIFICANT CHANGE UP
HGB BLD CALC-MCNC: 9.6 G/DL — LOW (ref 11.7–16.1)
HGB BLD-MCNC: 8.8 G/DL — LOW (ref 11.5–15.5)
HGB BLD-MCNC: 9.5 G/DL — LOW (ref 11.5–15.5)
HIV 1+2 AB+HIV1 P24 AG SERPL QL IA: SIGNIFICANT CHANGE UP
INR BLD: 1.23 RATIO — HIGH (ref 0.88–1.16)
INR BLD: 1.3 RATIO — HIGH (ref 0.88–1.16)
IRON SATN MFR SERPL: 15 % — SIGNIFICANT CHANGE UP (ref 14–50)
IRON SATN MFR SERPL: 32 UG/DL — SIGNIFICANT CHANGE UP (ref 30–160)
LACTATE BLDV-MCNC: 0.8 MMOL/L — SIGNIFICANT CHANGE UP (ref 0.5–2)
LACTATE BLDV-MCNC: 1.1 MMOL/L — SIGNIFICANT CHANGE UP (ref 0.5–2)
LIDOCAIN IGE QN: 576 U/L — HIGH (ref 7–60)
MAGNESIUM SERPL-MCNC: 2.2 MG/DL — SIGNIFICANT CHANGE UP (ref 1.6–2.6)
MAGNESIUM SERPL-MCNC: 2.4 MG/DL — SIGNIFICANT CHANGE UP (ref 1.6–2.6)
MCHC RBC-ENTMCNC: 30.2 PG — SIGNIFICANT CHANGE UP (ref 27–34)
MCHC RBC-ENTMCNC: 30.4 PG — SIGNIFICANT CHANGE UP (ref 27–34)
MCHC RBC-ENTMCNC: 33.5 GM/DL — SIGNIFICANT CHANGE UP (ref 32–36)
MCHC RBC-ENTMCNC: 34.1 GM/DL — SIGNIFICANT CHANGE UP (ref 32–36)
MCV RBC AUTO: 88.7 FL — SIGNIFICANT CHANGE UP (ref 80–100)
MCV RBC AUTO: 91 FL — SIGNIFICANT CHANGE UP (ref 80–100)
NRBC # BLD: 0 /100 WBCS — SIGNIFICANT CHANGE UP (ref 0–0)
NRBC # BLD: 0 /100 WBCS — SIGNIFICANT CHANGE UP (ref 0–0)
NRBC # FLD: 0 K/UL — SIGNIFICANT CHANGE UP (ref 0–0)
NRBC # FLD: 0 K/UL — SIGNIFICANT CHANGE UP (ref 0–0)
NT-PROBNP SERPL-SCNC: 2386 PG/ML — HIGH
PCO2 BLDV: 35 MMHG — LOW (ref 39–52)
PH BLDV: 7.26 — LOW (ref 7.32–7.43)
PHOSPHATE SERPL-MCNC: 8.5 MG/DL — HIGH (ref 2.5–4.5)
PHOSPHATE SERPL-MCNC: 9.5 MG/DL — HIGH (ref 2.5–4.5)
PLATELET # BLD AUTO: 254 K/UL — SIGNIFICANT CHANGE UP (ref 150–400)
PLATELET # BLD AUTO: 261 K/UL — SIGNIFICANT CHANGE UP (ref 150–400)
PO2 BLDV: 66 MMHG — HIGH (ref 25–45)
POTASSIUM BLDV-SCNC: 4.7 MMOL/L — SIGNIFICANT CHANGE UP (ref 3.5–5.1)
POTASSIUM SERPL-MCNC: 4 MMOL/L — SIGNIFICANT CHANGE UP (ref 3.5–5.3)
POTASSIUM SERPL-MCNC: 4.6 MMOL/L — SIGNIFICANT CHANGE UP (ref 3.5–5.3)
POTASSIUM SERPL-SCNC: 4 MMOL/L — SIGNIFICANT CHANGE UP (ref 3.5–5.3)
POTASSIUM SERPL-SCNC: 4.6 MMOL/L — SIGNIFICANT CHANGE UP (ref 3.5–5.3)
PROTHROM AB SERPL-ACNC: 14.3 SEC — HIGH (ref 10.5–13.4)
PROTHROM AB SERPL-ACNC: 15.1 SEC — HIGH (ref 10.5–13.4)
RBC # BLD: 2.91 M/UL — LOW (ref 3.8–5.2)
RBC # BLD: 3.08 M/UL — LOW (ref 3.8–5.2)
RBC # BLD: 3.12 M/UL — LOW (ref 3.8–5.2)
RBC # FLD: 11.9 % — SIGNIFICANT CHANGE UP (ref 10.3–14.5)
RBC # FLD: 12 % — SIGNIFICANT CHANGE UP (ref 10.3–14.5)
RETICS #: 47.4 K/UL — SIGNIFICANT CHANGE UP (ref 25–125)
RETICS/RBC NFR: 1.5 % — SIGNIFICANT CHANGE UP (ref 0.5–2.5)
RH IG SCN BLD-IMP: POSITIVE — SIGNIFICANT CHANGE UP
SAO2 % BLDV: 93.2 % — HIGH (ref 67–88)
SODIUM SERPL-SCNC: 139 MMOL/L — SIGNIFICANT CHANGE UP (ref 135–145)
SODIUM SERPL-SCNC: 140 MMOL/L — SIGNIFICANT CHANGE UP (ref 135–145)
TIBC SERPL-MCNC: 210 UG/DL — LOW (ref 220–430)
UIBC SERPL-MCNC: 178 UG/DL — SIGNIFICANT CHANGE UP (ref 110–370)
VIT B12 SERPL-MCNC: 230 PG/ML — SIGNIFICANT CHANGE UP (ref 200–900)
WBC # BLD: 9.06 K/UL — SIGNIFICANT CHANGE UP (ref 3.8–10.5)
WBC # BLD: 9.38 K/UL — SIGNIFICANT CHANGE UP (ref 3.8–10.5)
WBC # FLD AUTO: 9.06 K/UL — SIGNIFICANT CHANGE UP (ref 3.8–10.5)
WBC # FLD AUTO: 9.38 K/UL — SIGNIFICANT CHANGE UP (ref 3.8–10.5)

## 2023-06-07 PROCEDURE — 84165 PROTEIN E-PHORESIS SERUM: CPT | Mod: 26

## 2023-06-07 PROCEDURE — 99223 1ST HOSP IP/OBS HIGH 75: CPT | Mod: GC

## 2023-06-07 PROCEDURE — 88305 TISSUE EXAM BY PATHOLOGIST: CPT | Mod: 26

## 2023-06-07 PROCEDURE — 86334 IMMUNOFIX E-PHORESIS SERUM: CPT | Mod: 26

## 2023-06-07 PROCEDURE — 99222 1ST HOSP IP/OBS MODERATE 55: CPT

## 2023-06-07 PROCEDURE — 71250 CT THORAX DX C-: CPT | Mod: 26

## 2023-06-07 PROCEDURE — 36556 INSERT NON-TUNNEL CV CATH: CPT

## 2023-06-07 PROCEDURE — 76937 US GUIDE VASCULAR ACCESS: CPT | Mod: 26

## 2023-06-07 PROCEDURE — 77001 FLUOROGUIDE FOR VEIN DEVICE: CPT | Mod: 26,GC

## 2023-06-07 PROCEDURE — 70450 CT HEAD/BRAIN W/O DYE: CPT | Mod: 26

## 2023-06-07 RX ORDER — LEVOTHYROXINE SODIUM 125 MCG
100 TABLET ORAL DAILY
Refills: 0 | Status: DISCONTINUED | OUTPATIENT
Start: 2023-06-07 | End: 2023-06-22

## 2023-06-07 RX ORDER — ACETAMINOPHEN 500 MG
975 TABLET ORAL EVERY 6 HOURS
Refills: 0 | Status: DISCONTINUED | OUTPATIENT
Start: 2023-06-07 | End: 2023-06-22

## 2023-06-07 RX ORDER — PANTOPRAZOLE SODIUM 20 MG/1
40 TABLET, DELAYED RELEASE ORAL EVERY 12 HOURS
Refills: 0 | Status: DISCONTINUED | OUTPATIENT
Start: 2023-06-07 | End: 2023-06-09

## 2023-06-07 RX ORDER — ATORVASTATIN CALCIUM 80 MG/1
10 TABLET, FILM COATED ORAL AT BEDTIME
Refills: 0 | Status: DISCONTINUED | OUTPATIENT
Start: 2023-06-07 | End: 2023-06-22

## 2023-06-07 RX ORDER — CHLORHEXIDINE GLUCONATE 213 G/1000ML
1 SOLUTION TOPICAL DAILY
Refills: 0 | Status: DISCONTINUED | OUTPATIENT
Start: 2023-06-07 | End: 2023-06-22

## 2023-06-07 RX ORDER — AMLODIPINE BESYLATE 2.5 MG/1
5 TABLET ORAL DAILY
Refills: 0 | Status: DISCONTINUED | OUTPATIENT
Start: 2023-06-07 | End: 2023-06-22

## 2023-06-07 RX ORDER — ONDANSETRON 8 MG/1
4 TABLET, FILM COATED ORAL ONCE
Refills: 0 | Status: COMPLETED | OUTPATIENT
Start: 2023-06-07 | End: 2023-06-07

## 2023-06-07 RX ORDER — PREGABALIN 225 MG/1
1000 CAPSULE ORAL DAILY
Refills: 0 | Status: DISCONTINUED | OUTPATIENT
Start: 2023-06-07 | End: 2023-06-22

## 2023-06-07 RX ADMIN — ATORVASTATIN CALCIUM 10 MILLIGRAM(S): 80 TABLET, FILM COATED ORAL at 21:12

## 2023-06-07 RX ADMIN — PANTOPRAZOLE SODIUM 40 MILLIGRAM(S): 20 TABLET, DELAYED RELEASE ORAL at 21:11

## 2023-06-07 RX ADMIN — PANTOPRAZOLE SODIUM 40 MILLIGRAM(S): 20 TABLET, DELAYED RELEASE ORAL at 09:34

## 2023-06-07 RX ADMIN — ONDANSETRON 4 MILLIGRAM(S): 8 TABLET, FILM COATED ORAL at 04:35

## 2023-06-07 RX ADMIN — ONDANSETRON 4 MILLIGRAM(S): 8 TABLET, FILM COATED ORAL at 19:40

## 2023-06-07 RX ADMIN — ONDANSETRON 4 MILLIGRAM(S): 8 TABLET, FILM COATED ORAL at 09:29

## 2023-06-07 RX ADMIN — Medication 75 MEQ/KG/HR: at 10:47

## 2023-06-07 NOTE — CONSULT NOTE ADULT - SUBJECTIVE AND OBJECTIVE BOX
INCOMPLETE BATSHEVA HOBBS  83y  Female [id number]    HPI:  84yo  w/ h/o HTN, HLD, & HSIL s/p MILVIA/BSO () presented to the ED w/ c/o nausea & black stools x 1 week. Pt states she and her sister both experienced GI symptoms after eating a meal last week and attributed symptoms initially to food poisoning. Pt became concerned when she began having multiple loose dark colored stools (5/day). Pt also complains of persistent nausea & SOB with activity over the past week. Pt also endorses intermittent episodes of bright red blood with wiping over the past 1-2 months. Pt denies recent fevers, chills, CP, weight loss, abdominal pain, back pain, dysuria, vaginal discharge. Gyn oncology team consulted for abnormal pelvic mass & lymphadenopathy noted on CTAP performed in ED.     Pt has not followed up with a gynecologist in >10 years. Denies any other significant episodes of vaginal bleeding. Last mammogram >10yrs ago. Last colonoscopy was in 2008 with finding of benign polyps.    On presentation to ED, pt w/ hypertension. Labs significant for Cr 16.77, Lipase 870, BNP 2386. FOBT positive.    Name of GYN ONC Physician: Sukhwinder  Name of GYN Physician: Olayinka  OBHx: NSVDx2   GynHx: h/o "pre-cancer" noted on exam, was sent to Dr. Pretty for definitive treatment. Denies fibroids, cysts, endometriosis, STI's prior to TAHBSO.  PMH: HTN, HLD, hypothyroidism  PSH: TAHBSO (), lsc cholecystectomy, Lsc appendectomy  Meds: Diovan, Pravastatin, Synthroid, Aleve, ASA   Allx: NKDA  Social History:  Denies smoking use, drug use, alcohol use.    Vital Signs Last 24 Hrs  T(C): 36.6 (2023 13:40), Max: 36.8 (2023 03:51)  T(F): 97.9 (2023 13:40), Max: 98.2 (2023 03:51)  HR: 86 (2023 13:40) (78 - 97)  BP: 162/97 (2023 13:40) (119/89 - 187/73)  BP(mean): --  RR: 19 (2023 13:40) (16 - 20)  SpO2: 96% (2023 13:40) (96% - 100%)    Parameters below as of 2023 13:40  Patient On (Oxygen Delivery Method): room air      Physical Exam:   General: sitting comfortably in bed, NAD   Back: No CVA tenderness  Abd: Soft, non-tender, non-distended.   : to be perfomed, nam catheter in place  Ext: non-tender b/l, no edema     LABS:                        9.5    9.38  )-----------( 261      ( 2023 10:30 )             28.4     06-07    140  |  100  |  84<H>  ----------------------------<  102<H>  4.6   |  15<L>  |  16.51<H>    Ca    8.5      2023 10:30  Phos  9.5     06-07  Mg     2.40     06-07    TPro  6.6  /  Alb  3.7  /  TBili  <0.2  /  DBili  x   /  AST  14  /  ALT  5   /  AlkPhos  73  06-06    I&O's Detail    2023 07:01  -  2023 07:00  --------------------------------------------------------  IN:  Total IN: 0 mL    OUT:    Oral Fluid: 0 mL    Sodium Bicarbonate: 0 mL    Voided (mL): 0 mL  Total OUT: 0 mL    Total NET: 0 mL      2023 07:01  -  2023 15:26  --------------------------------------------------------  IN:    Sodium Bicarbonate: 600 mL  Total IN: 600 mL    OUT:    Oral Fluid: 0 mL    Voided (mL): 0 mL  Total OUT: 0 mL    Total NET: 600 mL    PT/INR - ( 2023 10:30 )   PT: 14.3 sec;   INR: 1.23 ratio         RADIOLOGY & ADDITIONAL STUDIES:  < from: CT Abdomen and Pelvis No Cont (23 @ 23:30) >    ACC: [id number] EXAM:  CT ABDOMEN AND PELVIS   ORDERED BY: DENNIS ANGEL     PROCEDURE DATE:  2023          INTERPRETATION:  CLINICAL INFORMATION: Acute renal failure.    COMPARISON: None available.    CONTRAST/COMPLICATIONS:  IV Contrast: None  Oral Contrast: None  Complications: None reported    PROCEDURE:  CT of the Abdomen and Pelvis was performed.  Sagittal and coronal reformats were performed.    FINDINGS:  Evaluation of the solid visceral organs and vasculature is limited   without the administration of intravenous contrast.    LOWER CHEST: Trace right pleural effusion. Bibasilar subsegmental   atelectasis.    LIVER: Unremarkable  BILE DUCTS: Unremarkable  GALLBLADDER: Cholecystectomy.  SPLEEN: Unremarkable  PANCREAS: Unremarkable  ADRENALS: Indeterminate 1.9 cm left adrenal nodule.  KIDNEYS/URETERS: Moderate right-sided hydroureteronephrosis, with   obstruction proximal to the UVJ in the region of a 3.6 x 2.9 x 4.6 cm   bulbous soft tissue mass inseparable from the cervical remnant. Mild   left-sided hydroureteronephrosis to the level of the UVJ. No radiopaque   nephrolithiasis nor ureterolithiasis. Mild bilateral perinephric edema..    BLADDER: Decompressed by Nam catheter with surrounding fat stranding  REPRODUCTIVE ORGANS: Hysterectomy with nodular soft tissue mass arising   from the right aspect of the cervical remnant, detailed above    BOWEL: No bowel obstruction. Appendectomy.Fluid filled large colon   compatible with a nonspecific diarrheal illness.  PERITONEUM: No free air.  VESSELS: Atherosclerotic changes.  RETROPERITONEUM/LYMPH NODES: Markedly enlarged bilateral obturator lymph   nodes,, the largest on the right measures 3.2 x 2.1 cm. Left para-aortic   and common iliac chain adenopathy is also present, the largest node in   the left common iliac chain is 2.2 x 1.8 cm  ABDOMINAL WALL: Postsurgical changes.  BONES: Degenerative changes.    IMPRESSION:    1. Moderate right-sided hydroureteronephrosis secondary to an obstructing   soft tissue mass inseparable from the cervical remnant. Clinical workup   to evaluate for primary neoplasm advised. Bulky bilateral pelvic lymph   nodes and left retroperitoneal lymph node suspicious for metastatic   disease.    --- End of Report ---          LILIAN MARIE MD; Resident Radiologist  This document has been electronically signed.  JOSIE BASILIO MD; Attending Radiologist  This document has been electronically signed. 2023 12:26AM    < end of copied text >       BATSHEVA HOBBS  83y  Female [id number]    HPI:  84yo  w/ h/o HTN, HLD, & HSIL s/p MILVIA/BSO () presented to the ED w/ c/o nausea & black stools x 1 week. Pt states she and her sister both experienced GI symptoms after eating a meal last week and attributed symptoms initially to food poisoning. Pt became concerned when she began having multiple loose dark colored stools (5/day). Pt also complains of persistent nausea & SOB with activity over the past week. Pt also endorses intermittent episodes of bright red blood with wiping over the past 1-2 months. Pt denies recent fevers, chills, CP, weight loss, abdominal pain, back pain, dysuria, vaginal discharge. Gyn oncology team consulted for abnormal pelvic mass & lymphadenopathy noted on CTAP performed in ED.     Pt has not followed up with a gynecologist in >10 years. Denies any other significant episodes of vaginal bleeding. Last mammogram >10yrs ago. Last colonoscopy was in 2008 with finding of benign polyps.    On presentation to ED, pt w/ hypertension. Labs significant for Cr 16.77, Lipase 870, BNP 2386. FOBT positive.    Name of GYN ONC Physician: Sukhwinder  Name of GYN Physician: Olayinka  OBHx: NSVDx2   GynHx: h/o "pre-cancer" noted on exam, was sent to Dr. Pretty for definitive treatment. Denies fibroids, cysts, endometriosis, STI's prior to TAHBSO.  PMH: HTN, HLD, hypothyroidism  PSH: TAHBSO (), lsc cholecystectomy, Lsc appendectomy  Meds: Diovan, Pravastatin, Synthroid, Aleve, ASA   Allx: NKDA  Social History:  Denies smoking use, drug use, alcohol use.    Vital Signs Last 24 Hrs  T(C): 36.6 (2023 13:40), Max: 36.8 (2023 03:51)  T(F): 97.9 (2023 13:40), Max: 98.2 (2023 03:51)  HR: 86 (2023 13:40) (78 - 97)  BP: 162/97 (2023 13:40) (119/89 - 187/73)  BP(mean): --  RR: 19 (2023 13:40) (16 - 20)  SpO2: 96% (2023 13:40) (96% - 100%)    Parameters below as of 2023 13:40  Patient On (Oxygen Delivery Method): room air      Physical Exam:   General: sitting comfortably in bed, NAD   Back: No CVA tenderness  Abd: Soft, non-tender, non-distended.   : speculum exam performed by gyn onc fellow. large blood clots noted within vaginal cavity, clots removed, abnormal mass/tissue noted beyond blood clots, biopsy taken, vagina packed with vaginal packing x1--tied around nam catheter  Ext: non-tender b/l, no edema     LABS:                        9.5    9.38  )-----------( 261      ( 2023 10:30 )             28.4     06-07    140  |  100  |  84<H>  ----------------------------<  102<H>  4.6   |  15<L>  |  16.51<H>    Ca    8.5      2023 10:30  Phos  9.5     06-07  Mg     2.40     06-07    TPro  6.6  /  Alb  3.7  /  TBili  <0.2  /  DBili  x   /  AST  14  /  ALT  5   /  AlkPhos  73  06-06    I&O's Detail    2023 07:01  -  2023 07:00  --------------------------------------------------------  IN:  Total IN: 0 mL    OUT:    Oral Fluid: 0 mL    Sodium Bicarbonate: 0 mL    Voided (mL): 0 mL  Total OUT: 0 mL    Total NET: 0 mL      2023 07:01  -  2023 15:26  --------------------------------------------------------  IN:    Sodium Bicarbonate: 600 mL  Total IN: 600 mL    OUT:    Oral Fluid: 0 mL    Voided (mL): 0 mL  Total OUT: 0 mL    Total NET: 600 mL    PT/INR - ( 2023 10:30 )   PT: 14.3 sec;   INR: 1.23 ratio         RADIOLOGY & ADDITIONAL STUDIES:  < from: CT Abdomen and Pelvis No Cont (23 @ 23:30) >    ACC: [id number] EXAM:  CT ABDOMEN AND PELVIS   ORDERED BY: DENNIS ANGEL     PROCEDURE DATE:  2023          INTERPRETATION:  CLINICAL INFORMATION: Acute renal failure.    COMPARISON: None available.    CONTRAST/COMPLICATIONS:  IV Contrast: None  Oral Contrast: None  Complications: None reported    PROCEDURE:  CT of the Abdomen and Pelvis was performed.  Sagittal and coronal reformats were performed.    FINDINGS:  Evaluation of the solid visceral organs and vasculature is limited   without the administration of intravenous contrast.    LOWER CHEST: Trace right pleural effusion. Bibasilar subsegmental   atelectasis.    LIVER: Unremarkable  BILE DUCTS: Unremarkable  GALLBLADDER: Cholecystectomy.  SPLEEN: Unremarkable  PANCREAS: Unremarkable  ADRENALS: Indeterminate 1.9 cm left adrenal nodule.  KIDNEYS/URETERS: Moderate right-sided hydroureteronephrosis, with   obstruction proximal to the UVJ in the region of a 3.6 x 2.9 x 4.6 cm   bulbous soft tissue mass inseparable from the cervical remnant. Mild   left-sided hydroureteronephrosis to the level of the UVJ. No radiopaque   nephrolithiasis nor ureterolithiasis. Mild bilateral perinephric edema..    BLADDER: Decompressed by Nam catheter with surrounding fat stranding  REPRODUCTIVE ORGANS: Hysterectomy with nodular soft tissue mass arising   from the right aspect of the cervical remnant, detailed above    BOWEL: No bowel obstruction. Appendectomy.Fluid filled large colon   compatible with a nonspecific diarrheal illness.  PERITONEUM: No free air.  VESSELS: Atherosclerotic changes.  RETROPERITONEUM/LYMPH NODES: Markedly enlarged bilateral obturator lymph   nodes,, the largest on the right measures 3.2 x 2.1 cm. Left para-aortic   and common iliac chain adenopathy is also present, the largest node in   the left common iliac chain is 2.2 x 1.8 cm  ABDOMINAL WALL: Postsurgical changes.  BONES: Degenerative changes.    IMPRESSION:    1. Moderate right-sided hydroureteronephrosis secondary to an obstructing   soft tissue mass inseparable from the cervical remnant. Clinical workup   to evaluate for primary neoplasm advised. Bulky bilateral pelvic lymph   nodes and left retroperitoneal lymph node suspicious for metastatic   disease.    --- End of Report ---          LILIAN MARIE MD; Resident Radiologist  This document has been electronically signed.  JOSIE BASILIO MD; Attending Radiologist  This document has been electronically signed. 2023 12:26AM    < end of copied text >

## 2023-06-07 NOTE — CONSULT NOTE ADULT - ASSESSMENT
82 y/o F s/p total hysterectomy (2008 due to cervical cancer) presents w/ anuria x1d. Pt. admits to nausea x2-3d, diarrhea x1wk, and fatigue. In ED, pt. was afebrile, WBC count 10.61, serum creatinine 16.24. Abd. CT scan showed R moderate hydroureteronephrosis 2/2 soft tissue mass, and mild L hydroureteronephrosis.    Plan:  -Irrigated Baca catheter   -Trend serum creatinine   -Nephrology for dialysis 06/07/2023 AM 82 y/o F s/p total hysterectomy (2008 due to cervical cancer) presents w/ anuria x1d. Pt. admits to nausea x2-3d, diarrhea x1wk, and fatigue. No fever, no flank pain. Creatinine was elevated to16.24.  CT scan showed R moderate hydroureteronephrosis 2/2 soft tissue mass, and mild L hydroureteronephrosis.

## 2023-06-07 NOTE — PROGRESS NOTE ADULT - SUBJECTIVE AND OBJECTIVE BOX
PROGRESS NOTE:   Authored by Bowen Noland, PGY-1     Patient is a 83y old  Female who presents with a chief complaint of Nausea, diarrhea, and renal failure (07 Jun 2023 13:18)      SUBJECTIVE / OVERNIGHT EVENTS:  NAEON. Pt endorsed nausea and vomit, otherwise no fever chill, chest pain.  ADDITIONAL REVIEW OF SYSTEMS:    MEDICATIONS  (STANDING):  atorvastatin 10 milliGRAM(s) Oral at bedtime  levothyroxine 100 MICROGram(s) Oral daily  pantoprazole  Injectable 40 milliGRAM(s) IV Push every 12 hours  sodium bicarbonate  Infusion 0.125 mEq/kG/Hr (75 mL/Hr) IV Continuous <Continuous>    MEDICATIONS  (PRN):  acetaminophen     Tablet .. 650 milliGRAM(s) Oral every 6 hours PRN Temp greater or equal to 38C (100.4F), Mild Pain (1 - 3), Moderate Pain (4 - 6)  acetaminophen     Tablet .. 975 milliGRAM(s) Oral every 6 hours PRN Severe Pain (7 - 10)  ondansetron Injectable 4 milliGRAM(s) IV Push every 6 hours PRN Nausea and/or Vomiting      CAPILLARY BLOOD GLUCOSE        I&O's Summary    06 Jun 2023 07:01  -  07 Jun 2023 07:00  --------------------------------------------------------  IN: 0 mL / OUT: 0 mL / NET: 0 mL    07 Jun 2023 07:01  -  07 Jun 2023 14:19  --------------------------------------------------------  IN: 600 mL / OUT: 0 mL / NET: 600 mL        PHYSICAL EXAM:  Vital Signs Last 24 Hrs  T(C): 36.6 (07 Jun 2023 13:40), Max: 36.8 (07 Jun 2023 03:51)  T(F): 97.9 (07 Jun 2023 13:40), Max: 98.2 (07 Jun 2023 03:51)  HR: 86 (07 Jun 2023 13:40) (78 - 97)  BP: 162/97 (07 Jun 2023 13:40) (119/89 - 187/73)  BP(mean): --  RR: 19 (07 Jun 2023 13:40) (16 - 20)  SpO2: 96% (07 Jun 2023 13:40) (96% - 100%)    Parameters below as of 07 Jun 2023 13:40  Patient On (Oxygen Delivery Method): room air      General: Awake and alert.  No acute distress.  Head: Normocephalic, atraumatic.    Eyes: PERRL.  EOMI.  No scleral icterus.  No conjunctival pallor.  Mouth: Moist MM.  No oropharyngeal exudates.    Neck: Supple.  Full range of motion.  No JVD.  No LAD.  No thyromegaly.  Trachea midline.    Heart: RRR.  Normal S1 and S2.  No murmurs, rubs, or gallops.  No LE edema b/l.   Lungs: Nonlabored breathing.  Good inspiratory effort.  CTAB.  No wheezes, crackles, or rhonchi.    Abdomen: BS+, soft, nontender with no rebound or guarding, nondistended.  No hepatomegaly.   Genitourinary: Baca catheter in place with no urine in Baca bag.  Skin: Warm and dry.  No rashes.  Extremities: No cyanosis.  2+ peripheral pulses b/l.  Musculoskeletal: No joint deformities.  No spinal or paraspinal tenderness.  Neuro: A&Ox3.  CN II-XII intact.  5/5 motor strength in UE and LE b/l.  Tactile sensation intact in UE and LE b/l.  No pronator drift b/l.  No focal deficits.    LABS:                        9.5    9.38  )-----------( 261      ( 07 Jun 2023 10:30 )             28.4     06-07    140  |  100  |  84<H>  ----------------------------<  102<H>  4.6   |  15<L>  |  16.51<H>    Ca    8.5      07 Jun 2023 10:30  Phos  9.5     06-07  Mg     2.40     06-07    TPro  6.6  /  Alb  3.7  /  TBili  <0.2  /  DBili  x   /  AST  14  /  ALT  5   /  AlkPhos  73  06-06    PT/INR - ( 07 Jun 2023 10:30 )   PT: 14.3 sec;   INR: 1.23 ratio                     RADIOLOGY & ADDITIONAL TESTS:  Results Reviewed:   Imaging Personally Reviewed:  Electrocardiogram Personally Reviewed:    COORDINATION OF CARE:  Care Discussed with Consultants/Other Providers [Y/N]:  Prior or Outpatient Records Reviewed [Y/N]:

## 2023-06-07 NOTE — PROGRESS NOTE ADULT - PROBLEM SELECTOR PLAN 5
Hgb 10.3 initially, 10 on repeat. Likely multifactorial, including from possible GIB, anemia of chronic disease, and severe renal failure.  - Plan as above for GIB  - Check iron studies, folate, vitamin B12, retic count, LDH, and haptoglobin  - Pt reporting new ALLEN, suspect 2/2 anemia. Pt not grossly fluid overloaded on exam despite severe renal failure. Will check TTE to evaluate for structural heart failure.

## 2023-06-07 NOTE — PROGRESS NOTE ADULT - ATTENDING COMMENTS
Chart reviewed. Vitals and Labs noted.   Pt seen and examined at bedside. Plan formulated with the resident. Detail H&P as above.     clinical well, stable hemodynamic despite very elevated Cr.   comfortable, no cp, no sob, no n/v/d. no abdominal pain.  no headache, no dizziness.   anuric in nam.     Dx:   Acute renal failure, metabolic acidosis.  GI symptoms likely renal related: neg GI PCR and neg c.diff.   Obstructive uropathy with possible component of ATN/AIN given daily NSAID intake.   Renal and urology eval appreciated.  monitor urine output and Cr. c/w bicarb drip.   Plan for Shiley and b/l nephrostomy placement given CT finding of soft tissue mass, causing b/l hydronephrosis.  GYN eval. remote hx of malignancy 2008.   DVT ppx    - Dr. DE LEÓN Htet ([facility])  - [phone] Chart reviewed. Vitals and Labs noted.   Pt seen and examined at bedside. Plan formulated with the resident. Detail H&P as above.     clinical well, stable hemodynamic despite very elevated Cr.   comfortable, no cp, no sob, no n/v/d. no abdominal pain.  no headache, no dizziness.   anuric in nam.     Dx:   Acute renal failure, metabolic acidosis.  GI symptoms likely renal related: neg GI PCR and neg c.diff.   Obstructive uropathy with possible component of ATN/AIN given daily NSAID intake.   Renal and urology eval appreciated.  monitor urine output and Cr. c/w bicarb drip.   Plan for Shiley and b/l nephrostomy placement given CT finding of soft tissue mass, causing b/l hydronephrosis.  GYN eval. remote hx of malignancy 2008.   hold ARB/HCTZ, can add Norvasc vs. hydralazine for BP control.   DVT ppx    - Dr. DE LEÓN Htet ([facility])  - [phone]

## 2023-06-07 NOTE — PHYSICAL THERAPY INITIAL EVALUATION ADULT - PERTINENT HX OF CURRENT PROBLEM, REHAB EVAL
patient is a 83 year old female who presents with nausea and diarrhea, including with melena, for past ~1 week. Found to be in acute renal failure 2/2 an obstructing soft tissue mass, also with possible GIB

## 2023-06-07 NOTE — CONSULT NOTE ADULT - SUBJECTIVE AND OBJECTIVE BOX
HPI:    Allergies:  No Known Allergies    Home Medications:  Aleve 220 mg oral tablet: 2 tab(s) orally once a day (07 Jun 2023 04:22)  aspirin 81 mg oral delayed release tablet: 1 tab(s) orally once a day (07 Jun 2023 04:22)  Caltrate 600 + D oral tablet: 1 tab(s) orally once a day (07 Jun 2023 04:22)  Diovan  mg-12.5 mg oral tablet: 1 tab(s) orally once a day (07 Jun 2023 04:22)  Levoxyl 100 mcg (0.1 mg) oral tablet: 1 tab(s) orally once a day (07 Jun 2023 04:22)  pravastatin 20 mg oral tablet: 1 tab(s) orally once a day (07 Jun 2023 04:22)    Hospital Medications:  acetaminophen     Tablet .. 650 milliGRAM(s) Oral every 6 hours PRN  acetaminophen     Tablet .. 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    Social History:   Tob: Denies  EtOH: Denies  Illicit Drugs: Denies    ROS:     General:  No wt loss, fevers, chills, night sweats, fatigue  Eyes:  Good vision, no reported pain  ENT:  No sore throat, pain, runny nose, dysphagia  CV:  No pain, palpitations, hypo/hypertension  Pulm:  No dyspnea, cough, tachypnea, wheezing  GI:  see HPI  :  No pain, bleeding, incontinence, nocturia  Muscle:  No pain, weakness  Neuro:  No weakness, tingling, memory problems  Psych:  No fatigue, insomnia, mood problems, depression  Endocrine:  No polyuria, polydipsia, cold/heat intolerance  Heme:  No petechiae, ecchymosis, easy bruisability  Skin:  No rash, tattoos, scars, edema    PHYSICAL EXAM:     GENERAL:  No acute distress  HEENT:  NCAT, no scleral icterus   CHEST:  no respiratory distress  HEART:  Regular rate and rhythm  ABDOMEN:  Soft, non-tender, non-distended, normoactive bowel sounds,  no masses  EXTREMITIES: No edema  SKIN:  No rash/erythema/ecchymoses/petechiae/wounds/abscess/warm/dry  NEURO:  Alert and oriented x 3, no asterixis    Vital Signs:  Vital Signs Last 24 Hrs  T(C): 36.6 (07 Jun 2023 07:30), Max: 36.8 (06 Jun 2023 13:00)  T(F): 97.8 (07 Jun 2023 07:30), Max: 98.3 (06 Jun 2023 13:00)  HR: 86 (07 Jun 2023 07:30) (78 - 86)  BP: 119/89 (07 Jun 2023 07:30) (119/89 - 195/84)  BP(mean): --  RR: 18 (07 Jun 2023 07:30) (16 - 20)  SpO2: 98% (07 Jun 2023 07:30) (97% - 100%)    Parameters below as of 07 Jun 2023 07:30  Patient On (Oxygen Delivery Method): room air      Daily Height in cm: 154.94 (07 Jun 2023 03:51)    Daily     LABS:                        9.5    9.38  )-----------( 261      ( 07 Jun 2023 10:30 )             28.4     Mean Cell Volume: 91.0 fL (06-07-23 @ 10:30)    06-06    140  |  102  |  83<H>  ----------------------------<  98  5.0   |  15<L>  |  16.24<H>    Ca    8.5      06 Jun 2023 21:55  Phos  9.0     06-06  Mg     2.50     06-06    TPro  6.6  /  Alb  3.7  /  TBili  <0.2  /  DBili  x   /  AST  14  /  ALT  5   /  AlkPhos  73  06-06    LIVER FUNCTIONS - ( 06 Jun 2023 16:53 )  Alb: 3.7 g/dL / Pro: 6.6 g/dL / ALK PHOS: 73 U/L / ALT: 5 U/L / AST: 14 U/L / GGT: x               Amylase Serum--      Lipase [id number]       Ammonia--  Amylase Serum--      Lipase [id number]       Ammonia--                          9.5    9.38  )-----------( 261      ( 07 Jun 2023 10:30 )             28.4                         10.0   10.61 )-----------( 245      ( 06 Jun 2023 21:55 )             29.9                         10.3   9.72  )-----------( 284      ( 06 Jun 2023 16:20 )             31.6       Imaging:             HPI:  84 yo F w/ PMHx HTN, HLD, hypothyroidism, and high-grade squamous intraepithelial lesion of cervix s/p MILVIA/BSO (2008) presents with nausea and diarrhea for past ~1 week. Patient initially attributed her symptoms to something she ate while travelling to [place] last week. Patient had 1 episode of NBNB vomiting, and has had significant nausea and fatigue for the last week. She also noticed 3 days of black stool, which she has not had before. It resolved and she subsequently developed soft yellow stool. As of today, she has not had a BM in 3 days. She denies hematochezia, abdominal pain. She takes 2 Advil a day x 10 years for joint pain, not on a PPI. She denies abdominal pain, weight loss, dysphagia, odynophagia, fevers, chills, hematochezia. She has never had an EGD. Last colonoscopy in 2008 at which time she was told she had benign polyps.    Patient found to be in acute renal failure on admission labwork. Hgb 10s.  CT A/P with moderate right-sided hydroureteronephrosis secondary to an obstructing soft tissue mass inseparable from the cervical remnant. Bulky bilateral pelvic lymph nodes and left retroperitoneal lymph node suspicious for metastatic   disease.    Allergies:  No Known Allergies    Home Medications:  Aleve 220 mg oral tablet: 2 tab(s) orally once a day (07 Jun 2023 04:22)  aspirin 81 mg oral delayed release tablet: 1 tab(s) orally once a day (07 Jun 2023 04:22)  Caltrate 600 + D oral tablet: 1 tab(s) orally once a day (07 Jun 2023 04:22)  Diovan  mg-12.5 mg oral tablet: 1 tab(s) orally once a day (07 Jun 2023 04:22)  Levoxyl 100 mcg (0.1 mg) oral tablet: 1 tab(s) orally once a day (07 Jun 2023 04:22)  pravastatin 20 mg oral tablet: 1 tab(s) orally once a day (07 Jun 2023 04:22)    Hospital Medications:  acetaminophen     Tablet .. 650 milliGRAM(s) Oral every 6 hours PRN  acetaminophen     Tablet .. 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    Social History:   Tob: Denies  EtOH: Denies  Illicit Drugs: Denies    ROS:   General:  No wt loss, fevers, chills, night sweats  Eyes:  Good vision  ENT:  No sore throat, pain, runny nose, dysphagia  CV:  No pain, palpitations, hypo/hypertension  Pulm:  No dyspnea, cough, tachypnea, wheezing  GI:  see HPI  :  No pain, bleeding, incontinence, nocturia  Muscle:  No pain, weakness  Neuro:  No weakness, tingling, memory problems  Psych:  No fatigue, insomnia  Heme:  No petechiae, ecchymosis  Skin:  No rash, tattoos, scars, edema    PHYSICAL EXAM:   GENERAL:  No acute distress  HEENT:  NCAT, no scleral icterus   CHEST:  no respiratory distress  HEART:  Regular rate and rhythm  ABDOMEN:  Soft, non-tender, non-distended, normoactive bowel sounds,  no masses  EXTREMITIES: No edema  SKIN:  No rash/erythema/ecchymoses/petechiae/wounds/abscess/warm/dry  NEURO:  Alert and oriented x 3, no asterixis    Vital Signs:  Vital Signs Last 24 Hrs  T(C): 36.6 (07 Jun 2023 07:30), Max: 36.8 (06 Jun 2023 13:00)  T(F): 97.8 (07 Jun 2023 07:30), Max: 98.3 (06 Jun 2023 13:00)  HR: 86 (07 Jun 2023 07:30) (78 - 86)  BP: 119/89 (07 Jun 2023 07:30) (119/89 - 195/84)  BP(mean): --  RR: 18 (07 Jun 2023 07:30) (16 - 20)  SpO2: 98% (07 Jun 2023 07:30) (97% - 100%)    Parameters below as of 07 Jun 2023 07:30  Patient On (Oxygen Delivery Method): room air      Daily Height in cm: 154.94 (07 Jun 2023 03:51)    Daily     LABS:                        9.5    9.38  )-----------( 261      ( 07 Jun 2023 10:30 )             28.4     Mean Cell Volume: 91.0 fL (06-07-23 @ 10:30)    06-06    140  |  102  |  83<H>  ----------------------------<  98  5.0   |  15<L>  |  16.24<H>    Ca    8.5      06 Jun 2023 21:55  Phos  9.0     06-06  Mg     2.50     06-06    TPro  6.6  /  Alb  3.7  /  TBili  <0.2  /  DBili  x   /  AST  14  /  ALT  5   /  AlkPhos  73  06-06    LIVER FUNCTIONS - ( 06 Jun 2023 16:53 )  Alb: 3.7 g/dL / Pro: 6.6 g/dL / ALK PHOS: 73 U/L / ALT: 5 U/L / AST: 14 U/L / GGT: x               Amylase Serum--      Lipase [id number]       Ammonia--  Amylase Serum--      Lipase [id number]       Ammonia--                          9.5    9.38  )-----------( 261      ( 07 Jun 2023 10:30 )             28.4                         10.0   10.61 )-----------( 245      ( 06 Jun 2023 21:55 )             29.9                         10.3   9.72  )-----------( 284      ( 06 Jun 2023 16:20 )             31.6       Imaging:    ACC: [id number] EXAM:  CT ABDOMEN AND PELVIS   ORDERED BY: DENNIS ANGEL     PROCEDURE DATE:  06/06/2023          INTERPRETATION:  CLINICAL INFORMATION: Acute renal failure.    COMPARISON: None available.    CONTRAST/COMPLICATIONS:  IV Contrast: None  Oral Contrast: None  Complications: None reported    PROCEDURE:  CT of the Abdomen and Pelvis was performed.  Sagittal and coronal reformats were performed.    FINDINGS:  Evaluation of the solid visceral organs and vasculature is limited   without the administration of intravenous contrast.    LOWER CHEST: Trace right pleural effusion. Bibasilar subsegmental   atelectasis.    LIVER: Unremarkable  BILE DUCTS: Unremarkable  GALLBLADDER: Cholecystectomy.  SPLEEN: Unremarkable  PANCREAS: Unremarkable  ADRENALS: Indeterminate 1.9 cm left adrenal nodule.  KIDNEYS/URETERS: Moderate right-sided hydroureteronephrosis, with   obstruction proximal to the UVJ in the region of a 3.6 x 2.9 x 4.6 cm   bulbous soft tissue mass inseparable from the cervical remnant. Mild   left-sided hydroureteronephrosis to the level of the UVJ. No radiopaque   nephrolithiasis nor ureterolithiasis. Mild bilateral perinephric edema..    BLADDER: Decompressed by Baca catheter with surrounding fat stranding  REPRODUCTIVE ORGANS: Hysterectomy with nodular soft tissue mass arising   from the right aspect of the cervical remnant, detailed above    BOWEL: No bowel obstruction. Appendectomy.Fluid filled large colon   compatible with a nonspecific diarrheal illness.  PERITONEUM: No free air.  VESSELS: Atherosclerotic changes.  RETROPERITONEUM/LYMPH NODES: Markedly enlarged bilateral obturator lymph   nodes,, the largest on the right measures 3.2 x 2.1 cm. Left para-aortic   and common iliac chain adenopathy is also present, the largest node in   the left common iliac chain is 2.2 x 1.8 cm  ABDOMINAL WALL: Postsurgical changes.  BONES: Degenerative changes.    IMPRESSION:    1. Moderate right-sided hydroureteronephrosis secondary to an obstructing   soft tissue mass inseparable from the cervical remnant. Clinical workup   to evaluate for primary neoplasm advised. Bulky bilateral pelvic lymph   nodes and left retroperitoneal lymph node suspicious for metastatic   disease.    --- End of Report ---      LILIAN MARIE MD; Resident Radiologist  This document has been electronically signed.  JOSIE BASILIO MD; Attending Radiologist  This document has been electronically signed. Jun 7 2023 12:26AM             HPI:  82 yo F w/ PMHx HTN, HLD, hypothyroidism, and high-grade squamous intraepithelial lesion of cervix s/p MILVIA/BSO (2008) presents with nausea and diarrhea for past ~1 week. Patient initially attributed her symptoms to something she ate while travelling to [place] last week. Patient had 1 episode of NBNB vomiting, and has had significant nausea and fatigue for the last week. She also noticed 3 days of black stool, which she has not had before. It resolved and she subsequently developed soft yellow stool. As of today, she has not had a BM in 3 days. She denies hematochezia, abdominal pain. She takes 2 Advil a day x 10 years for joint pain, not on a PPI. She denies abdominal pain, weight loss, dysphagia, odynophagia, fevers, chills, hematochezia. She has never had an EGD. Last colonoscopy in 2008 at which time she was told she had benign polyps.    Patient found to be in acute renal failure on admission labwork. Hgb 10s.  CT A/P with moderate right-sided hydroureteronephrosis secondary to an obstructing soft tissue mass inseparable from the cervical remnant. Bulky bilateral pelvic lymph nodes and left retroperitoneal lymph node suspicious for metastatic   disease.    Allergies:  No Known Allergies    Home Medications:  Aleve 220 mg oral tablet: 2 tab(s) orally once a day (07 Jun 2023 04:22)  aspirin 81 mg oral delayed release tablet: 1 tab(s) orally once a day (07 Jun 2023 04:22)  Caltrate 600 + D oral tablet: 1 tab(s) orally once a day (07 Jun 2023 04:22)  Diovan  mg-12.5 mg oral tablet: 1 tab(s) orally once a day (07 Jun 2023 04:22)  Levoxyl 100 mcg (0.1 mg) oral tablet: 1 tab(s) orally once a day (07 Jun 2023 04:22)  pravastatin 20 mg oral tablet: 1 tab(s) orally once a day (07 Jun 2023 04:22)    Hospital Medications:  acetaminophen     Tablet .. 650 milliGRAM(s) Oral every 6 hours PRN  acetaminophen     Tablet .. 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    History of cholecystectomy    History of appendectomy    H/O: hysterectomy        Family history:  No pertinent family history in first degree relatives    ROS: 14-point ROS reviewed and negative except as per HPI above    PHYSICAL EXAM:   GENERAL:  No acute distress  HEENT:  NCAT, no scleral icterus   CHEST:  no respiratory distress  HEART:  Regular rate and rhythm  ABDOMEN:  Soft, non-tender, non-distended, normoactive bowel sounds,  no masses  EXTREMITIES: No edema  SKIN:  No rash/erythema/ecchymoses/petechiae/wounds/abscess/warm/dry  NEURO:  Alert and oriented x 3, no asterixis    Vital Signs:  Vital Signs Last 24 Hrs  T(C): 36.6 (07 Jun 2023 07:30), Max: 36.8 (06 Jun 2023 13:00)  T(F): 97.8 (07 Jun 2023 07:30), Max: 98.3 (06 Jun 2023 13:00)  HR: 86 (07 Jun 2023 07:30) (78 - 86)  BP: 119/89 (07 Jun 2023 07:30) (119/89 - 195/84)  BP(mean): --  RR: 18 (07 Jun 2023 07:30) (16 - 20)  SpO2: 98% (07 Jun 2023 07:30) (97% - 100%)    Parameters below as of 07 Jun 2023 07:30  Patient On (Oxygen Delivery Method): room air      Daily Height in cm: 154.94 (07 Jun 2023 03:51)    Daily     LABS:                        9.5    9.38  )-----------( 261      ( 07 Jun 2023 10:30 )             28.4     Mean Cell Volume: 91.0 fL (06-07-23 @ 10:30)    06-06    140  |  102  |  83<H>  ----------------------------<  98  5.0   |  15<L>  |  16.24<H>    Ca    8.5      06 Jun 2023 21:55  Phos  9.0     06-06  Mg     2.50     06-06    TPro  6.6  /  Alb  3.7  /  TBili  <0.2  /  DBili  x   /  AST  14  /  ALT  5   /  AlkPhos  73  06-06    LIVER FUNCTIONS - ( 06 Jun 2023 16:53 )  Alb: 3.7 g/dL / Pro: 6.6 g/dL / ALK PHOS: 73 U/L / ALT: 5 U/L / AST: 14 U/L / GGT: x               Amylase Serum--      Lipase [id number]       Ammonia--  Amylase Serum--      Lipase [id number]       Ammonia--                          9.5    9.38  )-----------( 261      ( 07 Jun 2023 10:30 )             28.4                         10.0   10.61 )-----------( 245      ( 06 Jun 2023 21:55 )             29.9                         10.3   9.72  )-----------( 284      ( 06 Jun 2023 16:20 )             31.6       Imaging:    ACC: [id number] EXAM:  CT ABDOMEN AND PELVIS   ORDERED BY: DENNIS ANGEL     PROCEDURE DATE:  06/06/2023          INTERPRETATION:  CLINICAL INFORMATION: Acute renal failure.    COMPARISON: None available.    CONTRAST/COMPLICATIONS:  IV Contrast: None  Oral Contrast: None  Complications: None reported    PROCEDURE:  CT of the Abdomen and Pelvis was performed.  Sagittal and coronal reformats were performed.    FINDINGS:  Evaluation of the solid visceral organs and vasculature is limited   without the administration of intravenous contrast.    LOWER CHEST: Trace right pleural effusion. Bibasilar subsegmental   atelectasis.    LIVER: Unremarkable  BILE DUCTS: Unremarkable  GALLBLADDER: Cholecystectomy.  SPLEEN: Unremarkable  PANCREAS: Unremarkable  ADRENALS: Indeterminate 1.9 cm left adrenal nodule.  KIDNEYS/URETERS: Moderate right-sided hydroureteronephrosis, with   obstruction proximal to the UVJ in the region of a 3.6 x 2.9 x 4.6 cm   bulbous soft tissue mass inseparable from the cervical remnant. Mild   left-sided hydroureteronephrosis to the level of the UVJ. No radiopaque   nephrolithiasis nor ureterolithiasis. Mild bilateral perinephric edema..    BLADDER: Decompressed by Baca catheter with surrounding fat stranding  REPRODUCTIVE ORGANS: Hysterectomy with nodular soft tissue mass arising   from the right aspect of the cervical remnant, detailed above    BOWEL: No bowel obstruction. Appendectomy.Fluid filled large colon   compatible with a nonspecific diarrheal illness.  PERITONEUM: No free air.  VESSELS: Atherosclerotic changes.  RETROPERITONEUM/LYMPH NODES: Markedly enlarged bilateral obturator lymph   nodes,, the largest on the right measures 3.2 x 2.1 cm. Left para-aortic   and common iliac chain adenopathy is also present, the largest node in   the left common iliac chain is 2.2 x 1.8 cm  ABDOMINAL WALL: Postsurgical changes.  BONES: Degenerative changes.    IMPRESSION:    1. Moderate right-sided hydroureteronephrosis secondary to an obstructing   soft tissue mass inseparable from the cervical remnant. Clinical workup   to evaluate for primary neoplasm advised. Bulky bilateral pelvic lymph   nodes and left retroperitoneal lymph node suspicious for metastatic   disease.    --- End of Report ---      LILIAN MARIE MD; Resident Radiologist  This document has been electronically signed.  JOSIE BASILIO MD; Attending Radiologist  This document has been electronically signed. Jun 7 2023 12:26AM

## 2023-06-07 NOTE — CONSULT NOTE ADULT - PROBLEM SELECTOR RECOMMENDATION 9
GYN ONC Fellow Addendum:    Pt seen and examined at bedside. Agree with above. 82yo w/ reported hx of MILVIA/BSO in 2008 for pre cancer in cervix vs uterus p/w nausea, black stools admitted for renal failure 2/2 hydronephrosis likely from pelvic mass also w/ LAD on imaging. 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    VS reviewed  Labs reviewed    On exam pt w/ tumor infiltrating the entire upper vagina extremely friable w/ significant bleeding w/ manipulation just w/ the speculum. Fixed parametria bilaterally. Biopsy performed of mass at bedside, bleeding from site noted and monsels applied w/ continued bleeding and so decision made to place vaginal packing which was then tied to the nam catheter. 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 Surgical management not possible given extent of disease will likely be candidate for chemo/RT pending extent of metastasis.  Pain control  DVT ppx  Rest of care per primary team    Janet Santoro MD

## 2023-06-07 NOTE — PHYSICAL THERAPY INITIAL EVALUATION ADULT - GENERAL OBSERVATIONS, REHAB EVAL
Patient received in semifowler position in bed in NAD +nam, /70 HR 88 spo2 97% room air. Patient denies chest pain, SOB, headache, and dizziness.

## 2023-06-07 NOTE — PROGRESS NOTE ADULT - PROBLEM SELECTOR PLAN 6
Lipase elevated to 870. CTAP noncontrast showing unremarkable pancreas. Pt without any abdominal pain or tenderness on exam. Low suspicion for acute pancreatitis at this time, more likely in setting of severe renal failure and possibly from gastroenteritis.  - S/p 2L bolus of NS in ED. Will hold off on additional IVF for now, as pt not making any urine.  - Trend lipase for now

## 2023-06-07 NOTE — PHYSICAL THERAPY INITIAL EVALUATION ADULT - GAIT DEVIATIONS NOTED, PT EVAL
decreased tyrese/increased time in double stance/decreased velocity of limb motion/decreased weight-shifting ability

## 2023-06-07 NOTE — CONSULT NOTE ADULT - SUBJECTIVE AND OBJECTIVE BOX
HPI  82 y/o F PMHx HTN, HLD, hypothyroidism, s/p appendectomy, s/p cholecystectomy, s/p total hysterectomy (2008 due to cervical cancer) presents w/ anuria x1d. Pt. admits to nausea x2-3d. Pt. admits to loose stools x1wk. Pt. admits to fatigue. Pt. last saw PCP/blood work 2020. Denies vomiting, fever, flank pain, dysuria.    In ED, pt. was afebrile, serum creatinine was 16.24, WBC count 10.61. Abd. CT scan showed moderate R hydroureteronephrosis due to soft tissue mass that may be 2/2 primary neoplasm. Mild L hydroureteronephrosis not due to mass or calculus.    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      History of cholecystectomy      History of appendectomy      H/O: hysterectomy          MEDICATIONS  (STANDING):  pantoprazole  Injectable 40 milliGRAM(s) IV Push every 12 hours  sodium bicarbonate  Infusion 0.125 mEq/kG/Hr (75 mL/Hr) IV Continuous <Continuous>    MEDICATIONS  (PRN):  acetaminophen     Tablet .. 650 milliGRAM(s) Oral every 6 hours PRN Temp greater or equal to 38C (100.4F), Mild Pain (1 - 3), Moderate Pain (4 - 6)  acetaminophen     Tablet .. 975 milliGRAM(s) Oral every 6 hours PRN Severe Pain (7 - 10)  ondansetron Injectable 4 milliGRAM(s) IV Push every 6 hours PRN Nausea and/or Vomiting      FAMILY HISTORY:  No pertinent family history in first degree relatives        Allergies    No Known Allergies    Intolerances        SOCIAL HISTORY:    REVIEW OF SYSTEMS: Otherwise negative as stated in HPI    Physical Exam  Vital signs  T(F): 98.2 (06-07-23 @ 03:51), Max: 98.3 (06-06-23 @ 13:00)  HR: 85 (06-07-23 @ 03:51)  BP: 159/71 (06-07-23 @ 03:51)  SpO2: 97% (06-07-23 @ 03:51)    Output    UOP    Gen:  [X] NAD [] toxic    Pulm:  [X] no resp distress [] no substernal retractions  	  CV:  [X] RRR    GI:  [X] Soft [X] ND [X] NT    :  Baca in place; no urine output                        	  MSK:  Edema []Y [X]N    LABS:      06-06 @ 21:55    WBC 10.61 / Hct 29.9  / SCr 16.24    06-06 @ 16:53    WBC --    / Hct --    / SCr 16.77          Urine Cx:  Blood Cx:    RADIOLOGY:     HPI  82 y/o F PMHx HTN, HLD, hypothyroidism,  s/p total hysterectomy in 2008 due to cervical cancer presents to the ED with diarrhea for about a week associated with nausea and fatigue. She voided yesterday but had no output since then.  Denies vomiting, fever, flank pain, dysuria. Pt. last saw PCP and had blood work in 2020  In ED, pt. was afebrile, serum creatinine was 16.24, WBC count 10.61. Baca was placed in ED without any output. CT scan showed moderate R hydroureteronephrosis due to soft tissue mass that may be 2/2 primary neoplasm. Mild L hydroureteronephrosis not due to mass or calculus.    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      History of cholecystectomy      History of appendectomy      H/O: hysterectomy          MEDICATIONS  (STANDING):  pantoprazole  Injectable 40 milliGRAM(s) IV Push every 12 hours  sodium bicarbonate  Infusion 0.125 mEq/kG/Hr (75 mL/Hr) IV Continuous <Continuous>    MEDICATIONS  (PRN):  acetaminophen     Tablet .. 650 milliGRAM(s) Oral every 6 hours PRN Temp greater or equal to 38C (100.4F), Mild Pain (1 - 3), Moderate Pain (4 - 6)  acetaminophen     Tablet .. 975 milliGRAM(s) Oral every 6 hours PRN Severe Pain (7 - 10)  ondansetron Injectable 4 milliGRAM(s) IV Push every 6 hours PRN Nausea and/or Vomiting      FAMILY HISTORY:  No pertinent family history in first degree relatives        Allergies    No Known Allergies    Intolerances        SOCIAL HISTORY: + 1 year history of smoking    REVIEW OF SYSTEMS: Otherwise negative as stated in HPI    Physical Exam  Vital signs  T(F): 98.2 (06-07-23 @ 03:51), Max: 98.3 (06-06-23 @ 13:00)  HR: 85 (06-07-23 @ 03:51)  BP: 159/71 (06-07-23 @ 03:51)  SpO2: 97% (06-07-23 @ 03:51)    Output    UOP    Gen:  [X] NAD [] toxic    Pulm:  [X] no resp distress [] no substernal retractions  	  CV:  [X] RRR    GI:  [X] Soft [X] ND [X] NT    :  Baca in place, irrigated, no output.                        	  MSK:  Edema []Y [X]N    LABS:      06-06 @ 21:55    WBC 10.61 / Hct 29.9  / SCr 16.24    06-06 @ 16:53    WBC --    / Hct --    / SCr 16.77          Urine Cx:  Blood Cx:    RADIOLOGY:  < from: CT Abdomen and Pelvis No Cont (06.06.23 @ 23:30) >    ACC: [id number] EXAM:  CT ABDOMEN AND PELVIS   ORDERED BY: DENNIS ANGEL     PROCEDURE DATE:  06/06/2023          INTERPRETATION:  CLINICAL INFORMATION: Acute renal failure.    COMPARISON: None available.    CONTRAST/COMPLICATIONS:  IV Contrast: None  Oral Contrast: None  Complications: None reported    PROCEDURE:  CT of the Abdomen and Pelvis was performed.  Sagittal and coronal reformats were performed.    FINDINGS:  Evaluation of the solid visceral organs and vasculature is limited   without the administration of intravenous contrast.    LOWER CHEST: Trace right pleural effusion. Bibasilar subsegmental   atelectasis.    LIVER: Unremarkable  BILE DUCTS: Unremarkable  GALLBLADDER: Cholecystectomy.  SPLEEN: Unremarkable  PANCREAS: Unremarkable  ADRENALS: Indeterminate 1.9 cm left adrenal nodule.  KIDNEYS/URETERS: Moderate right-sided hydroureteronephrosis, with   obstruction proximal to the UVJ in the region of a 3.6 x 2.9 x 4.6 cm   bulbous soft tissue mass inseparable from the cervical remnant. Mild   left-sided hydroureteronephrosis to the level of the UVJ. No radiopaque   nephrolithiasis nor ureterolithiasis. Mild bilateral perinephric edema..    BLADDER: Decompressed by Baca catheter with surrounding fat stranding  REPRODUCTIVE ORGANS: Hysterectomy with nodular soft tissue mass arising   from the right aspect of the cervical remnant, detailed above    BOWEL: No bowel obstruction. Appendectomy.Fluid filled large colon   compatible with a nonspecific diarrheal illness.  PERITONEUM: No free air.  VESSELS: Atherosclerotic changes.  RETROPERITONEUM/LYMPH NODES: Markedly enlarged bilateral obturator lymph   nodes,, the largest on the right measures 3.2 x 2.1 cm. Left para-aortic   and common iliac chain adenopathy is also present, the largest node in   the left common iliac chain is 2.2 x 1.8 cm  ABDOMINAL WALL: Postsurgical changes.  BONES: Degenerative changes.    IMPRESSION:    1. Moderate right-sided hydroureteronephrosis secondary to an obstructing   soft tissue mass inseparable from the cervical remnant. Clinical workup   to evaluate for primary neoplasm advised. Bulky bilateral pelvic lymph   nodes and left retroperitoneal lymph node suspicious for metastatic   disease.    --- End of Report ---          LILIAN MARIE MD; Resident Radiologist  This document has been electronically signed.  JOSIE BASILIO MD; Attending Radiologist  This document has been electronically signed. Jun 7 2023 12:26AM    < end of copied text >

## 2023-06-07 NOTE — PROGRESS NOTE ADULT - PROBLEM SELECTOR PLAN 4
Pt with episodes of diarrhea for past ~1 week, with black stools/melena for 3 days, now with liquid mustard-yellow diarrhea. Pt with history of taking Aleve 2 tabs daily for the past 10 years in addition to baby ASA daily. Concerning for upper GIB 2/2 PUD, but can be from viral/bacterial gastroenteritis. Rectal exam with no gross blood or melena, FOBT positive.  - IV Protonix 80 mg x1 ordered stat. C/w IV Protonix 40 mg BID.  - Check C diff PCR, GI PCR, and stool cxs  - Repeat CBC overnight with H/H remaining stable with Hgb 10  - Monitor CBC at least daily, more frequently if recurrence of melena  - Monitor BMs  - Keep NPO for now pending IR eval  - Keep active T&S  - Hold pt's home baby ASA  - GI consult to be called during admission once pt more medically stable

## 2023-06-07 NOTE — PATIENT PROFILE ADULT - FALL HARM RISK - HARM RISK INTERVENTIONS

## 2023-06-07 NOTE — CONSULT NOTE ADULT - ASSESSMENT
84yo  w/ h/o HTN, HLD, & HSIL s/p MILVIA/BSO () presented to the ED w/ c/o nausea & black stools x 1 week. Gyn oncology consulted for findings of pelvic mass, lymphadenopathy, & BL hydronephrosis on imaging.    -pelvic exam to be performed  -if mass visualized on exam, will perform biopsy at bedside  -final recs pending evaluation with fellow    Obi, PGY3  D/w Dr. Santoro, Gyn Onc Fellow 84yo  w/ h/o HTN, HLD, & HSIL s/p MILVIA/BSO () presented to the ED w/ c/o nausea & black stools x 1 week. Gyn oncology consulted for findings of pelvic mass, lymphadenopathy, & BL hydronephrosis on imaging.    -consent for pelvic exam & biopsy obtained--placed in pts chart  -pelvic exam performed by gyn onc fellow--large blood clots noted within vaginal canal & irregular tissue noted beyond clots  -mass biopsied & brought to pathology lab  -vaginal packing x 1 placed  -recommend 930pm CBC  -gyn onc team to evaluate pts bleeding overnight  -if pt w/ heavy bleeding beyond vaginal packing, please call gyn at [phone]  -will reevaluate vaginal bleeding in AM  -gyn onc team to continue to follow    Obi, PGY3  D/w Dr. Santoro, Gyn Onc Fellow

## 2023-06-07 NOTE — PROGRESS NOTE ADULT - PROBLEM SELECTOR PLAN 1
Serum Cr 16.77 and BUN 83. Unknown baseline renal function, as no prior labs available. Duration of renal failure also unclear, but suspect pt with history of CKD given pt's daily use of Aleve for past 10 years. Likely now with KINJAL on CKD from obstructive uropathy, GI fluid losses (diarrhea), and poor PO intake.   - Renal consulted on admission, appreciate recs  - CTAP noncontrast obtained overnight and demonstrating moderate right-sided hydroureteronephrosis, with obstruction proximal to the UVJ in the region of a 3.6 x 2.9 x 4.6 cm bulbous soft tissue mass inseparable from the cervical remnant, mild left-sided hydroureteronephrosis to the level of the UVJ, and pelvic and RP lymphadenopathy concerning for metastatic disease  - Urology consult called overnight for possible ureteral stent placement. F/u official recs.  - IR consult also ordered on admission, as pt might require nephrostomy tube placement instead  - S/p Baca catheter insertion in ED. C/w Baca catheter for now.  - Per Renal, no need for urgent HD tonight, plan for initiation of HD tomorrow. Vascular or IR consult to be called in AM for Shiley placement.   - US abdominal dopplers ordered to r/o renal artery stenosis  - UA, urine lytes, and urine protein/Cr ratio ordered, but pt currently with no urine output  - Check ALESIA, p-ANCA, c-ANCA, anti-GBM Ab, HBsAg, Hep C Ab, HIV, Parvovirus, C3, C4, SPEP, serum immunofixation, serum free light chains, and anti-PLA2R Ab  - C/w bicarb infusion as below  - Monitor serum Cr and urine output with strict I&Os  - Avoid NSAIDs, ACEi/ARBs, contrast, and nephrotoxic agents   - Renally dose meds

## 2023-06-07 NOTE — CONSULT NOTE ADULT - PROBLEM SELECTOR RECOMMENDATION 9
-Trend serum creatinine   -Nephrology for dialysis 06/07/2023 AM  -IR consult for nephrostomy tube placement  -NPO  Coags  Case discussed with Dr Bosch/Dr Gama -Trend serum creatinine   -Nephrology for dialysis 06/07/2023 AM  -IR consult for nephrostomy tube placement  -NPO  -Coags  -Recommend oncology evaluation for cervical mass and need for biopsy  Case discussed with Dr Bosch/Dr Gama

## 2023-06-07 NOTE — CONSULT NOTE ADULT - ATTENDING COMMENTS
# Dark stools, now resolved: possibly recent UGIB in setting of PUD given chronic NSAID use  # Nausea: Suspect likely in setting of renal failure and obstructive uropathy  # History of high-grade squamous intraepithelial lesion of cervix s/p MILVIA/BSO (2008)  # KINJAL 2/2 obstructing soft tissue mass inseparable from cervical remnant   # Diarrhea    --PPI BID for now for possible underlying PUD  --Would defer EGD given renal failure from obstructing lesion, can pursue either prior to discharge vs outpatient pending clinical course   --Avoid NSAIDs  --Infectious workup with GI PCR, C diff if ongoing diarrhea    Additional recommendations as above. Please reach out if questions, concerns or acute change in clinical status as more urgent endoscopic evaluation may be warranted.
KINJAL on CKD   unknown baseline    Multiple insults to kidneys, will need to get HD today.  Will also need PCNs, will plan for HD once catheter placed.
84 y/o F PMHx HTN, HLD, hypothyroidism, s/p total hysterectomy due to cervical cancer p/w diarrhea, nausea and fatigue. Afebrile, Cr notable for 16.24, no UOP upon nam placement CT imaging significant for b/l hydronephrosis 2/2 bulky pelvic and retroperitoneal LNs likely from metastatic disease. Pt would benefit from PCN placement  given extrinsic compression from soft tissue mass. F/u nephrology recs.

## 2023-06-07 NOTE — CONSULT NOTE ADULT - ASSESSMENT
Interventional Radiology    Evaluate for Procedure:     HPI: 83y Female with HTN, HLD, hypothyroidism, and high-grade squamous intraepithelial lesion s/p MILVIA/BSO (2008) presents with nausea and diarrhea, including with melena, for past ~1 week. Found to be in acute renal failure. CT 6/6 with Moderate right-sided hydroureteronephrosis secondary to an obstructing soft tissue mass inseparable from the cervical remnant. Clinical workup to evaluate for primary neoplasm advised. Bulky bilateral pelvic lymph nodes and left retroperitoneal lymph node suspicious for metastatic disease.    Allergies: No Known Allergies    Medications (Abx/Cardiac/Anticoagulation/Blood Products)      Data:  154.9  90  T(C): 36.7  HR: 97  BP: 121/77  RR: 18  SpO2: 100%    -WBC 9.38 / HgB 9.5 / Hct 28.4 / Plt 261  -Na 140 / Cl 100 / BUN 84 / Glucose 102  -K 4.6 / CO2 15 / Cr 16.51  -ALT -- / Alk Phos -- / T.Bili --  -INR 1.23 / PTT --      Radiology:     Assessment/Plan: 83y Female with acute renal failure. CT 6/6 with Moderate right-sided hydroureteronephrosis secondary to an obstructing soft tissue mass inseparable from the cervical remnant. Clinical workup to evaluate for primary neoplasm advised. Bulky bilateral pelvic lymph nodes and left retroperitoneal lymph node suspicious for metastatic disease. IR consulted for non-tunneled HD catheter and R percutaneous nephrostomy.    -- IR will plan to perform non-tunneled dialysis catheter today, 6/7  -- IR will plan to form R percutaneous nephrostomy tomorrow Thurs 6/8  -- NPO at midnight tonight  -- hold a.m. anticoagulation on 6/8  -- Routine morning labs plus coags (PT/PTT/INR) on 6/8  -- Please write separate IR pre procedure notes for non-tunneled HD catheter and for right PCN  -- please place separate IR procedure request orders under Dr. Tony

## 2023-06-07 NOTE — CONSULT NOTE ADULT - ASSESSMENT
82 yo F w/ PMHx HTN, HLD, hypothyroidism, and high-grade squamous intraepithelial lesion of cervix s/p MILVIA/BSO (2008) presents with nausea and diarrhea for past ~1 week. GI consulted for nausea, reported dark stool.    #Nausea x1 week, likely in setting of acute renal failure  #Dark stool, now resolved 82 yo F w/ PMHx HTN, HLD, hypothyroidism, and high-grade squamous intraepithelial lesion of cervix s/p MILVIA/BSO (2008) presents with nausea and diarrhea for past ~1 week. GI consulted for nausea, reported dark stool.    #Nausea x1 week, likely in setting of acute renal failure  #Dark stool, now resolved. Patient is at risk for developing PUD, gastritis given 10 year daily NSAID use.   #Acute renal failure, hydronephrosis 2/2 obstructing soft tissue mass, c/f malignancy    Recommendations  - trend CBC, keep active T&S, transfuse for Hgb<7  - monitor for active signs of bleeding  - would hold off on EGD at this time given resolution of dark stools and other active medical issues  - protonix 40 mg BID x 4 weeks given high risk for PUD     All recommendations are tentative until note is attested by attending.     Leslie Eller, PGY5  Gastroenterology/Hepatology Fellow  Available on [employer] Teams  [phone] ([employer] Short Range Pager)  [phone] (Long Range Pager)    After 5pm, please contact the on-call GI fellow. [phone]

## 2023-06-08 LAB
ALBUMIN SERPL ELPH-MCNC: 3.3 G/DL — SIGNIFICANT CHANGE UP (ref 3.3–5)
ALP SERPL-CCNC: 61 U/L — SIGNIFICANT CHANGE UP (ref 40–120)
ALT FLD-CCNC: 6 U/L — SIGNIFICANT CHANGE UP (ref 4–33)
ANION GAP SERPL CALC-SCNC: 20 MMOL/L — HIGH (ref 7–14)
AST SERPL-CCNC: 14 U/L — SIGNIFICANT CHANGE UP (ref 4–32)
AUTO DIFF PNL BLD: ABNORMAL
B19V DNA FLD QL NAA+PROBE: SIGNIFICANT CHANGE UP IU/ML
B19V IGG SER-ACNC: 1.37 INDEX — HIGH (ref 0–0.9)
B19V IGG+IGM SER-IMP: POSITIVE
B19V IGG+IGM SER-IMP: SIGNIFICANT CHANGE UP
B19V IGM FLD-ACNC: 0.17 INDEX — SIGNIFICANT CHANGE UP (ref 0–0.9)
B19V IGM SER-ACNC: NEGATIVE — SIGNIFICANT CHANGE UP
BILIRUB SERPL-MCNC: 0.2 MG/DL — SIGNIFICANT CHANGE UP (ref 0.2–1.2)
BUN SERPL-MCNC: 60 MG/DL — HIGH (ref 7–23)
C-ANCA SER-ACNC: NEGATIVE — SIGNIFICANT CHANGE UP
CALCIUM SERPL-MCNC: 8.3 MG/DL — LOW (ref 8.4–10.5)
CHLORIDE SERPL-SCNC: 97 MMOL/L — LOW (ref 98–107)
CO2 SERPL-SCNC: 23 MMOL/L — SIGNIFICANT CHANGE UP (ref 22–31)
CREAT SERPL-MCNC: 14.02 MG/DL — HIGH (ref 0.5–1.3)
EGFR: 2 ML/MIN/1.73M2 — LOW
GLUCOSE SERPL-MCNC: 111 MG/DL — HIGH (ref 70–99)
HCT VFR BLD CALC: 25.5 % — LOW (ref 34.5–45)
HGB BLD-MCNC: 9 G/DL — LOW (ref 11.5–15.5)
MAGNESIUM SERPL-MCNC: 2.2 MG/DL — SIGNIFICANT CHANGE UP (ref 1.6–2.6)
MCHC RBC-ENTMCNC: 30.4 PG — SIGNIFICANT CHANGE UP (ref 27–34)
MCHC RBC-ENTMCNC: 35.3 GM/DL — SIGNIFICANT CHANGE UP (ref 32–36)
MCV RBC AUTO: 86.1 FL — SIGNIFICANT CHANGE UP (ref 80–100)
MPO AB + PR3 PNL SER: SIGNIFICANT CHANGE UP
MRSA PCR RESULT.: SIGNIFICANT CHANGE UP
NRBC # BLD: 0 /100 WBCS — SIGNIFICANT CHANGE UP (ref 0–0)
NRBC # FLD: 0 K/UL — SIGNIFICANT CHANGE UP (ref 0–0)
P-ANCA SER-ACNC: NEGATIVE — SIGNIFICANT CHANGE UP
PHOSPHATE SERPL-MCNC: 7.1 MG/DL — HIGH (ref 2.5–4.5)
PLATELET # BLD AUTO: 233 K/UL — SIGNIFICANT CHANGE UP (ref 150–400)
POTASSIUM SERPL-MCNC: 3.7 MMOL/L — SIGNIFICANT CHANGE UP (ref 3.5–5.3)
POTASSIUM SERPL-SCNC: 3.7 MMOL/L — SIGNIFICANT CHANGE UP (ref 3.5–5.3)
PROT SERPL-MCNC: 5.7 G/DL — LOW (ref 6–8.3)
RBC # BLD: 2.96 M/UL — LOW (ref 3.8–5.2)
RBC # FLD: 11.9 % — SIGNIFICANT CHANGE UP (ref 10.3–14.5)
S AUREUS DNA NOSE QL NAA+PROBE: SIGNIFICANT CHANGE UP
SODIUM SERPL-SCNC: 140 MMOL/L — SIGNIFICANT CHANGE UP (ref 135–145)
WBC # BLD: 9.53 K/UL — SIGNIFICANT CHANGE UP (ref 3.8–10.5)
WBC # FLD AUTO: 9.53 K/UL — SIGNIFICANT CHANGE UP (ref 3.8–10.5)

## 2023-06-08 PROCEDURE — 93306 TTE W/DOPPLER COMPLETE: CPT | Mod: 26

## 2023-06-08 PROCEDURE — 99232 SBSQ HOSP IP/OBS MODERATE 35: CPT | Mod: GC

## 2023-06-08 PROCEDURE — 50432 PLMT NEPHROSTOMY CATHETER: CPT | Mod: RT

## 2023-06-08 PROCEDURE — 99222 1ST HOSP IP/OBS MODERATE 55: CPT

## 2023-06-08 PROCEDURE — 99233 SBSQ HOSP IP/OBS HIGH 50: CPT

## 2023-06-08 RX ORDER — AMPICILLIN SODIUM AND SULBACTAM SODIUM 250; 125 MG/ML; MG/ML
3 INJECTION, POWDER, FOR SUSPENSION INTRAMUSCULAR; INTRAVENOUS ONCE
Refills: 0 | Status: COMPLETED | OUTPATIENT
Start: 2023-06-08 | End: 2023-06-08

## 2023-06-08 RX ORDER — AMPICILLIN TRIHYDRATE 250 MG
3 CAPSULE ORAL ONCE
Refills: 0 | Status: DISCONTINUED | OUTPATIENT
Start: 2023-06-08 | End: 2023-06-08

## 2023-06-08 RX ADMIN — ATORVASTATIN CALCIUM 10 MILLIGRAM(S): 80 TABLET, FILM COATED ORAL at 23:42

## 2023-06-08 RX ADMIN — PANTOPRAZOLE SODIUM 40 MILLIGRAM(S): 20 TABLET, DELAYED RELEASE ORAL at 23:43

## 2023-06-08 RX ADMIN — AMPICILLIN SODIUM AND SULBACTAM SODIUM 200 GRAM(S): 250; 125 INJECTION, POWDER, FOR SUSPENSION INTRAMUSCULAR; INTRAVENOUS at 18:11

## 2023-06-08 RX ADMIN — AMPICILLIN SODIUM AND SULBACTAM SODIUM 200 GRAM(S): 250; 125 INJECTION, POWDER, FOR SUSPENSION INTRAMUSCULAR; INTRAVENOUS at 11:40

## 2023-06-08 RX ADMIN — Medication 100 MICROGRAM(S): at 06:17

## 2023-06-08 RX ADMIN — PREGABALIN 1000 MICROGRAM(S): 225 CAPSULE ORAL at 11:40

## 2023-06-08 RX ADMIN — PANTOPRAZOLE SODIUM 40 MILLIGRAM(S): 20 TABLET, DELAYED RELEASE ORAL at 10:54

## 2023-06-08 RX ADMIN — ONDANSETRON 4 MILLIGRAM(S): 8 TABLET, FILM COATED ORAL at 10:54

## 2023-06-08 RX ADMIN — CHLORHEXIDINE GLUCONATE 1 APPLICATION(S): 213 SOLUTION TOPICAL at 11:44

## 2023-06-08 RX ADMIN — AMLODIPINE BESYLATE 5 MILLIGRAM(S): 2.5 TABLET ORAL at 07:11

## 2023-06-08 RX ADMIN — Medication 75 MEQ/KG/HR: at 06:16

## 2023-06-08 NOTE — PROGRESS NOTE ADULT - PROBLEM SELECTOR PLAN 1
Pt with advanced kidney failure and no KINJAL in the setting of GI fluid losses (diarrhea), decreased PO intake, meds (Valsartan-HCTZ) and chronic NSAIDs use. Exact duration and etiology of kidney failure remains unknown. Pt presented with melanotic stool/diarrhea past 1 week. SCr significantly elevated at 16.77 on ER labs. No prior labs available to review. No urine studies available. Baca placed in ER with no urine return noted. Pt with hx of chronic NSAIDs use. Pt likely with ?advanced CKD. CT scan with obstructive uropathy seen - moderate right-sided hydroureteronephrosis secondary to an obstructing soft tissue mass present. Pt also noted to be hypertensive and anemic. Check LDH, and haptoglobin. IR consult for PCN placement for today. Pt. with uremic symptoms and significant kidney failure. Discussed at length with pt. need for RRT given clinical presentation/uremic symptoms. Pt agrees and consented for RRT/HD. HD consent obtained and kept on file. Initiation of HD tomorrow 6/7/23, plan for next session today. Can dicontinue IV bicarbonate infusion for now. Strict I/O. Avoid ACEi/ARBS/IV contrast/NSAIDs/Nephrotoxins. Dose meds as per egfr. Monitor labs.     Please call with any questions:    Casandra Pleitez  Microsoft Teams / X [phone]  After 5pm and on weekends call renal fellow on call

## 2023-06-08 NOTE — PROGRESS NOTE ADULT - SUBJECTIVE AND OBJECTIVE BOX
[facility] DIVISION OF KIDNEY DISEASES AND HYPERTENSION -- FOLLOW UP NOTE  --------------------------------------------------------------------------------  Chief Complaint: KINJAL    24 hour events/subjective:  Patient seen this AM without complaints. S/p HD x 1. No shortness of breath. NPO for PCNs      PAST HISTORY  --------------------------------------------------------------------------------  No significant changes to PMH, PSH, FHx, SHx, unless otherwise noted    ALLERGIES & MEDICATIONS  --------------------------------------------------------------------------------  Allergies    No Known Allergies    Intolerances      Standing Inpatient Medications  amLODIPine   Tablet 5 milliGRAM(s) Oral daily  atorvastatin 10 milliGRAM(s) Oral at bedtime  chlorhexidine 2% Cloths 1 Application(s) Topical daily  cyanocobalamin 1000 MICROGram(s) Oral daily  levothyroxine 100 MICROGram(s) Oral daily  pantoprazole  Injectable 40 milliGRAM(s) IV Push every 12 hours  sodium bicarbonate  Infusion 0.125 mEq/kG/Hr IV Continuous <Continuous>    PRN Inpatient Medications  acetaminophen     Tablet .. 650 milliGRAM(s) Oral every 6 hours PRN  acetaminophen     Tablet .. 975 milliGRAM(s) Oral every 6 hours PRN  ondansetron Injectable 4 milliGRAM(s) IV Push every 6 hours PRN          VITALS/PHYSICAL EXAM  --------------------------------------------------------------------------------  T(C): 36.9 (06-08-23 @ 11:00), Max: 36.9 (06-07-23 @ 21:25)  HR: 78 (06-08-23 @ 11:00) (76 - 87)  BP: 162/65 (06-08-23 @ 11:00) (140/51 - 188/65)  RR: 19 (06-08-23 @ 11:00) (17 - 19)  SpO2: 99% (06-08-23 @ 11:00) (94% - 100%)  Wt(kg): --  Height (cm): 154.9 (06-07-23 @ 03:51)  Weight (kg): 90 (06-06-23 @ 19:11)  BMI (kg/m2): 37.5 (06-07-23 @ 03:51)  BSA (m2): 1.88 (06-07-23 @ 03:51)      06-07-23 @ 07:01  -  06-08-23 @ 07:00  --------------------------------------------------------  IN: 2475 mL / OUT: 1170 mL / NET: 1305 mL      Physical Exam:  	Gen: elderly female, resting, NAD  	HEENT: Anicteric  	Pulm: CTA B/L  	CV: S1S2+  	Abd: Soft, +BS    	Ext: No LE edema B/L  	Neuro: Awake             : Baca+ with no urine             Skin: Warm and dry      LABS/STUDIES  --------------------------------------------------------------------------------              9.0    9.53  >-----------<  233      [06-08-23 @ 10:25]              25.5     140  |  97  |  60  ----------------------------<  111      [06-08-23 @ 10:25]  3.7   |  23  |  14.02        Ca     8.3     [06-08-23 @ 10:25]      Mg     2.20     [06-08-23 @ 10:25]      Phos  7.1     [06-08-23 @ 10:25]    TPro  5.7  /  Alb  3.3  /  TBili  0.2  /  DBili  x   /  AST  14  /  ALT  6   /  AlkPhos  61  [06-08-23 @ 10:25]    PT/INR: PT 15.1 , INR 1.30       [06-07-23 @ 22:30]      Creatinine Trend:  SCr 14.02 [06-08 @ 10:25]  SCr 16.30 [06-07 @ 22:30]  SCr 16.51 [06-07 @ 10:30]  SCr 16.24 [06-06 @ 21:55]  SCr 16.77 [06-06 @ 16:53]        Iron 32, TIBC 210, %sat 15      [06-07-23 @ 07:04]  Ferritin 151      [06-07-23 @ 07:04]    HBsAg Nonreact      [06-07-23 @ 07:04]  HCV 0.06, Nonreact      [06-07-23 @ 07:04]  HIV Nonreact      [06-07-23 @ 07:04]

## 2023-06-08 NOTE — PROGRESS NOTE ADULT - ATTENDING COMMENTS
Chart reviewed. Vitals and Labs noted.   Pt seen and examined at bedside. Plan formulated with the resident. Detail H&P as above.     Dx:   Acute renal failure, metabolic acidosis.  GI symptoms likely renal related: neg GI PCR and neg c.diff.   Obstructive uropathy/ hydronephorosis due to obstructing soft tissue mass, with possible component of ATN/AIN given daily NSAID intake.   Renal and urology eval appreciated.  monitor urine output and Cr. c/w bicarb drip.   Plan for Shiley and b/l nephrostomy placement given CT finding of soft tissue mass, causing b/l hydronephrosis.  GYN eval. s/p biopsy, path pending.  remote hx of malignancy 2008.  lymph notes noted on CT.   hold ARB/HCTZ, can add Norvasc vs. hydralazine for BP control, improved with dialysis  DVT ppx    d/w the daughter at bedside. all questions answered.     - Dr. SARTHAK Baroneet ([employer])  - [phone]

## 2023-06-08 NOTE — PROGRESS NOTE ADULT - ASSESSMENT
82 yo F w/ PMHx HTN, HLD, hypothyroidism, and high-grade squamous intraepithelial lesion of cervix s/p MILVIA/BSO (2008) presents with nausea and diarrhea for past ~1 week. GI consulted for nausea, reported dark stool.    #Nausea x1 week, likely in setting of acute renal failure  #Dark stool, now resolved. Patient is at risk for developing PUD, gastritis given 10 year daily NSAID use.   #Acute renal failure, hydronephrosis 2/2 obstructing soft tissue mass, c/f malignancy    Recommendations  - trend CBC, keep active T&S, transfuse for Hgb<7  - monitor for active signs of bleeding  - no plan for EGD at this time given resolution of dark stools and her ongoing active medical issues. She can pursue EGD either prior to discharge or on the outpatient basis pending clinical course.   - protonix 40 mg BID x 4 weeks given high risk for PUD given long term NSAID use    GI will sign off. Please call back as needed.     All recommendations are tentative until note is attested by attending.     Leslie Eller, PGY5  Gastroenterology/Hepatology Fellow  Available on [employer] Teams  [phone] ([employer] Short Range Pager)  [phone] (Long Range Pager)    After 5pm, please contact the on-call GI fellow. [phone]

## 2023-06-08 NOTE — PROGRESS NOTE ADULT - SUBJECTIVE AND OBJECTIVE BOX
Gastroenterology/Hepatology Progress Note    Interval Events: Patient s/p HD cath and HD. Awaiting nephrostomy tube placement.   States she had 2 small yellow stools, but is having no melena or hematochezia. She is have vaginal bleeding.   Denies abdominal pain.     Allergies:  No Known Allergies    Hospital Medications:  acetaminophen     Tablet .. 650 milliGRAM(s) Oral every 6 hours PRN  acetaminophen     Tablet .. 975 milliGRAM(s) Oral every 6 hours PRN  amLODIPine   Tablet 5 milliGRAM(s) Oral daily  atorvastatin 10 milliGRAM(s) Oral at bedtime  chlorhexidine 2% Cloths 1 Application(s) Topical daily  cyanocobalamin 1000 MICROGram(s) Oral daily  levothyroxine 100 MICROGram(s) Oral daily  ondansetron Injectable 4 milliGRAM(s) IV Push every 6 hours PRN  pantoprazole  Injectable 40 milliGRAM(s) IV Push every 12 hours    ROS: 14 point ROS negative unless otherwise state in subjective    PHYSICAL EXAM:   Vital Signs:  Vital Signs Last 24 Hrs  T(C): 36.9 (08 Jun 2023 11:00), Max: 36.9 (07 Jun 2023 21:25)  T(F): 98.5 (08 Jun 2023 11:00), Max: 98.5 (08 Jun 2023 11:00)  HR: 78 (08 Jun 2023 11:00) (76 - 87)  BP: 162/65 (08 Jun 2023 11:00) (140/51 - 188/65)  BP(mean): --  RR: 19 (08 Jun 2023 11:00) (17 - 19)  SpO2: 99% (08 Jun 2023 11:00) (94% - 100%)    Parameters below as of 08 Jun 2023 11:00  Patient On (Oxygen Delivery Method): room air    Daily     Daily     GENERAL:  No acute distress  HEENT:  NCAT, no scleral icterus  CHEST: no resp distress  HEART:  RRR  ABDOMEN:  Soft, non-tender, non-distended   EXTREMITIES:  No cyanosis, clubbing, or edema  SKIN:  No rash/erythema/ecchymoses   NEURO:  Alert and oriented x 3    LABS:                        9.0    9.53  )-----------( 233      ( 08 Jun 2023 10:25 )             25.5     Mean Cell Volume: 86.1 fL (06-08-23 @ 10:25)    06-08    140  |  97<L>  |  60<H>  ----------------------------<  111<H>  3.7   |  23  |  14.02<H>    Ca    8.3<L>      08 Jun 2023 10:25  Phos  7.1     06-08  Mg     2.20     06-08    TPro  5.7<L>  /  Alb  3.3  /  TBili  0.2  /  DBili  x   /  AST  14  /  ALT  6   /  AlkPhos  61  06-08    LIVER FUNCTIONS - ( 08 Jun 2023 10:25 )  Alb: 3.3 g/dL / Pro: 5.7 g/dL / ALK PHOS: 61 U/L / ALT: 6 U/L / AST: 14 U/L / GGT: x           PT/INR - ( 07 Jun 2023 22:30 )   PT: 15.1 sec;   INR: 1.30 ratio                   Imaging:  reviewed         Gastroenterology/Hepatology Progress Note    Interval Events: Patient s/p HD cath and HD. Awaiting nephrostomy tube placement.   States she had 2 small yellow stools, but is having no melena or hematochezia. She is have vaginal bleeding.   Denies abdominal pain.     Allergies:  No Known Allergies    Hospital Medications:  acetaminophen     Tablet .. 650 milliGRAM(s) Oral every 6 hours PRN  acetaminophen     Tablet .. 975 milliGRAM(s) Oral every 6 hours PRN  amLODIPine   Tablet 5 milliGRAM(s) Oral daily  atorvastatin 10 milliGRAM(s) Oral at bedtime  chlorhexidine 2% Cloths 1 Application(s) Topical daily  cyanocobalamin 1000 MICROGram(s) Oral daily  levothyroxine 100 MICROGram(s) Oral daily  ondansetron Injectable 4 milliGRAM(s) IV Push every 6 hours PRN  pantoprazole  Injectable 40 milliGRAM(s) IV Push every 12 hours    ROS: 14 point ROS negative unless otherwise state in subjective    PHYSICAL EXAM:   Vital Signs:  Vital Signs Last 24 Hrs  T(C): 36.9 (08 Jun 2023 11:00), Max: 36.9 (07 Jun 2023 21:25)  T(F): 98.5 (08 Jun 2023 11:00), Max: 98.5 (08 Jun 2023 11:00)  HR: 78 (08 Jun 2023 11:00) (76 - 87)  BP: 162/65 (08 Jun 2023 11:00) (140/51 - 188/65)  BP(mean): --  RR: 19 (08 Jun 2023 11:00) (17 - 19)  SpO2: 99% (08 Jun 2023 11:00) (94% - 100%)    Parameters below as of 08 Jun 2023 11:00  Patient On (Oxygen Delivery Method): room air    Daily     Daily     GENERAL:  No acute distress  HEENT:  NCAT, no scleral icterus  CHEST: no resp distress  HEART:  RRR  ABDOMEN:  Soft, non-tender, non-distended   EXTREMITIES:  No cyanosis, clubbing, or edema  SKIN:  No rash/erythema/ecchymoses   NEURO:  Alert and oriented x 3    LABS:                        9.0    9.53  )-----------( 233      ( 08 Jun 2023 10:25 )             25.5     Mean Cell Volume: 86.1 fL (06-08-23 @ 10:25)    06-08    140  |  97<L>  |  60<H>  ----------------------------<  111<H>  3.7   |  23  |  14.02<H>    Ca    8.3<L>      08 Jun 2023 10:25  Phos  7.1     06-08  Mg     2.20     06-08    TPro  5.7<L>  /  Alb  3.3  /  TBili  0.2  /  DBili  x   /  AST  14  /  ALT  6   /  AlkPhos  61  06-08    LIVER FUNCTIONS - ( 08 Jun 2023 10:25 )  Alb: 3.3 g/dL / Pro: 5.7 g/dL / ALK PHOS: 61 U/L / ALT: 6 U/L / AST: 14 U/L / GGT: x           PT/INR - ( 07 Jun 2023 22:30 )   PT: 15.1 sec;   INR: 1.30 ratio

## 2023-06-08 NOTE — PROGRESS NOTE ADULT - ATTENDING COMMENTS
No further dark stools; reports 2 small yellowish BM since admission. Awaiting IR intervention.     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 Additional recommendations as above. Please reach out if questions, concerns or acute change in clinical status as more urgent endoscopic evaluation may be warranted.

## 2023-06-08 NOTE — PROGRESS NOTE ADULT - SUBJECTIVE AND OBJECTIVE BOX
PROGRESS NOTE:   Authored by Bowen Noland, PGY-1     Patient is a 83y old  Female who presents with a chief complaint of Nausea, diarrhea, and renal failure (08 Jun 2023 14:00)      SUBJECTIVE / OVERNIGHT EVENTS:  NAEON.  Vaginal bleeding is weaning and stopped eventually around am.  Otherwise, no fever, chill, chest pain, sob.      ADDITIONAL REVIEW OF SYSTEMS:    MEDICATIONS  (STANDING):  amLODIPine   Tablet 5 milliGRAM(s) Oral daily  atorvastatin 10 milliGRAM(s) Oral at bedtime  chlorhexidine 2% Cloths 1 Application(s) Topical daily  cyanocobalamin 1000 MICROGram(s) Oral daily  levothyroxine 100 MICROGram(s) Oral daily  pantoprazole  Injectable 40 milliGRAM(s) IV Push every 12 hours  sodium bicarbonate  Infusion 0.125 mEq/kG/Hr (75 mL/Hr) IV Continuous <Continuous>    MEDICATIONS  (PRN):  acetaminophen     Tablet .. 650 milliGRAM(s) Oral every 6 hours PRN Temp greater or equal to 38C (100.4F), Mild Pain (1 - 3), Moderate Pain (4 - 6)  acetaminophen     Tablet .. 975 milliGRAM(s) Oral every 6 hours PRN Severe Pain (7 - 10)  ondansetron Injectable 4 milliGRAM(s) IV Push every 6 hours PRN Nausea and/or Vomiting      CAPILLARY BLOOD GLUCOSE        I&O's Summary    07 Jun 2023 07:01  -  08 Jun 2023 07:00  --------------------------------------------------------  IN: 2475 mL / OUT: 1170 mL / NET: 1305 mL        PHYSICAL EXAM:  Vital Signs Last 24 Hrs  T(C): 36.9 (08 Jun 2023 11:00), Max: 36.9 (07 Jun 2023 21:25)  T(F): 98.5 (08 Jun 2023 11:00), Max: 98.5 (08 Jun 2023 11:00)  HR: 78 (08 Jun 2023 11:00) (76 - 87)  BP: 162/65 (08 Jun 2023 11:00) (140/51 - 188/65)  BP(mean): --  RR: 19 (08 Jun 2023 11:00) (17 - 19)  SpO2: 99% (08 Jun 2023 11:00) (94% - 100%)    Parameters below as of 08 Jun 2023 11:00  Patient On (Oxygen Delivery Method): room air        General: Awake and alert.  No acute distress.  Head: Normocephalic, atraumatic.    Eyes: PERRL.  EOMI.  No scleral icterus.  No conjunctival pallor.  Mouth: Moist MM.  No oropharyngeal exudates.    Neck: Supple.  Full range of motion.  No JVD.  No LAD.  No thyromegaly.  Trachea midline.    Heart: RRR.  Normal S1 and S2.  No murmurs, rubs, or gallops.  No LE edema b/l.   Lungs: Nonlabored breathing.  Good inspiratory effort.  CTAB.  No wheezes, crackles, or rhonchi.    Abdomen: BS+, soft, nontender with no rebound or guarding, nondistended.  No hepatomegaly.   Genitourinary: Baca catheter in place with no urine in Baca bag.  Skin: Warm and dry.  No rashes.  Extremities: No cyanosis.  2+ peripheral pulses b/l.  Musculoskeletal: No joint deformities.  No spinal or paraspinal tenderness.  Neuro: A&Ox3.  CN II-XII intact.  5/5 motor strength in UE and LE b/l.  Tactile sensation intact in UE and LE b/l.  No pronator drift b/l.  No focal deficits.    LABS:                        9.0    9.53  )-----------( 233      ( 08 Jun 2023 10:25 )             25.5     06-08    140  |  97<L>  |  60<H>  ----------------------------<  111<H>  3.7   |  23  |  14.02<H>    Ca    8.3<L>      08 Jun 2023 10:25  Phos  7.1     06-08  Mg     2.20     06-08    TPro  5.7<L>  /  Alb  3.3  /  TBili  0.2  /  DBili  x   /  AST  14  /  ALT  6   /  AlkPhos  61  06-08    PT/INR - ( 07 Jun 2023 22:30 )   PT: 15.1 sec;   INR: 1.30 ratio                   Culture - Stool (collected 06 Jun 2023 23:40)  Source: .Stool Feces  Preliminary Report (08 Jun 2023 08:02):    No enteric pathogens to date: Final culture pending        RADIOLOGY & ADDITIONAL TESTS:  Results Reviewed:   Imaging Personally Reviewed:  Electrocardiogram Personally Reviewed:    COORDINATION OF CARE:  Care Discussed with Consultants/Other Providers [Y/N]:  Prior or Outpatient Records Reviewed [Y/N]:

## 2023-06-09 LAB
ANA PAT FLD IF-IMP: ABNORMAL
ANA PATTERN 2: ABNORMAL
ANA TITR SER: ABNORMAL
ANION GAP SERPL CALC-SCNC: 18 MMOL/L — HIGH (ref 7–14)
ANTI NUCLEAR FACTOR TITER 2: ABNORMAL
BUN SERPL-MCNC: 41 MG/DL — HIGH (ref 7–23)
CALCIUM SERPL-MCNC: 8.2 MG/DL — LOW (ref 8.4–10.5)
CHLORIDE SERPL-SCNC: 99 MMOL/L — SIGNIFICANT CHANGE UP (ref 98–107)
CO2 SERPL-SCNC: 23 MMOL/L — SIGNIFICANT CHANGE UP (ref 22–31)
CREAT SERPL-MCNC: 10.85 MG/DL — HIGH (ref 0.5–1.3)
CULTURE RESULTS: SIGNIFICANT CHANGE UP
EGFR: 3 ML/MIN/1.73M2 — LOW
GAS PNL BLDV: SIGNIFICANT CHANGE UP
GLUCOSE SERPL-MCNC: 94 MG/DL — SIGNIFICANT CHANGE UP (ref 70–99)
HAPTOGLOB SERPL-MCNC: 187 MG/DL — SIGNIFICANT CHANGE UP (ref 34–200)
HBV CORE AB SER-ACNC: SIGNIFICANT CHANGE UP
HBV SURFACE AB SER-ACNC: <3 MIU/ML — LOW
HBV SURFACE AG SER-ACNC: SIGNIFICANT CHANGE UP
HCT VFR BLD CALC: 25.9 % — LOW (ref 34.5–45)
HCV AB S/CO SERPL IA: 0.07 S/CO — SIGNIFICANT CHANGE UP (ref 0–0.99)
HCV AB SERPL-IMP: SIGNIFICANT CHANGE UP
HGB BLD-MCNC: 8.9 G/DL — LOW (ref 11.5–15.5)
LACTATE BLDV-MCNC: 1 MMOL/L — SIGNIFICANT CHANGE UP (ref 0.5–2)
LDH SERPL L TO P-CCNC: 260 U/L — HIGH (ref 135–225)
MAGNESIUM SERPL-MCNC: 2.1 MG/DL — SIGNIFICANT CHANGE UP (ref 1.6–2.6)
MCHC RBC-ENTMCNC: 30.9 PG — SIGNIFICANT CHANGE UP (ref 27–34)
MCHC RBC-ENTMCNC: 34.4 GM/DL — SIGNIFICANT CHANGE UP (ref 32–36)
MCV RBC AUTO: 89.9 FL — SIGNIFICANT CHANGE UP (ref 80–100)
NRBC # BLD: 0 /100 WBCS — SIGNIFICANT CHANGE UP (ref 0–0)
NRBC # FLD: 0 K/UL — SIGNIFICANT CHANGE UP (ref 0–0)
PHOSPHATE SERPL-MCNC: 5.8 MG/DL — HIGH (ref 2.5–4.5)
PHOSPHOLIPASE A2 RECEPTOR ELISA: <1.8 RU/ML — SIGNIFICANT CHANGE UP (ref 0–19.9)
PLATELET # BLD AUTO: 215 K/UL — SIGNIFICANT CHANGE UP (ref 150–400)
POTASSIUM SERPL-MCNC: 3.9 MMOL/L — SIGNIFICANT CHANGE UP (ref 3.5–5.3)
POTASSIUM SERPL-SCNC: 3.9 MMOL/L — SIGNIFICANT CHANGE UP (ref 3.5–5.3)
RBC # BLD: 2.88 M/UL — LOW (ref 3.8–5.2)
RBC # FLD: 12.2 % — SIGNIFICANT CHANGE UP (ref 10.3–14.5)
SODIUM SERPL-SCNC: 140 MMOL/L — SIGNIFICANT CHANGE UP (ref 135–145)
SPECIMEN SOURCE: SIGNIFICANT CHANGE UP
WBC # BLD: 9.65 K/UL — SIGNIFICANT CHANGE UP (ref 3.8–10.5)
WBC # FLD AUTO: 9.65 K/UL — SIGNIFICANT CHANGE UP (ref 3.8–10.5)

## 2023-06-09 PROCEDURE — 99232 SBSQ HOSP IP/OBS MODERATE 35: CPT | Mod: GC

## 2023-06-09 RX ORDER — PANTOPRAZOLE SODIUM 20 MG/1
40 TABLET, DELAYED RELEASE ORAL
Refills: 0 | Status: DISCONTINUED | OUTPATIENT
Start: 2023-06-09 | End: 2023-06-22

## 2023-06-09 RX ADMIN — Medication 100 MICROGRAM(S): at 06:26

## 2023-06-09 RX ADMIN — PREGABALIN 1000 MICROGRAM(S): 225 CAPSULE ORAL at 13:12

## 2023-06-09 RX ADMIN — AMLODIPINE BESYLATE 5 MILLIGRAM(S): 2.5 TABLET ORAL at 07:47

## 2023-06-09 RX ADMIN — CHLORHEXIDINE GLUCONATE 1 APPLICATION(S): 213 SOLUTION TOPICAL at 13:12

## 2023-06-09 RX ADMIN — ATORVASTATIN CALCIUM 10 MILLIGRAM(S): 80 TABLET, FILM COATED ORAL at 22:55

## 2023-06-09 NOTE — PROGRESS NOTE ADULT - SUBJECTIVE AND OBJECTIVE BOX
[facility] Division of Kidney Diseases & Hypertension  FOLLOW UP NOTE  [phone]--------------------------------------------------------------------------------    Chief Complaint: KINJAL    24 hour events/subjective: Pt. underwent R nephrostomy tube placement yesterday. Currently with minimal output from nephrostomy. Pt. was seen and evaluated at bedside this morning. She reports feeling well, endorses no complaints. Denies any headaches, fevers/chills, chest pain, SOB, and LE edema.      PAST HISTORY  --------------------------------------------------------------------------------  No significant changes to PMH, PSH, FHx, SHx, unless otherwise noted    ALLERGIES & MEDICATIONS  --------------------------------------------------------------------------------  Allergies    No Known Allergies    Intolerances      Standing Inpatient Medications  amLODIPine   Tablet 5 milliGRAM(s) Oral daily  atorvastatin 10 milliGRAM(s) Oral at bedtime  chlorhexidine 2% Cloths 1 Application(s) Topical daily  cyanocobalamin 1000 MICROGram(s) Oral daily  levothyroxine 100 MICROGram(s) Oral daily  pantoprazole  Injectable 40 milliGRAM(s) IV Push every 12 hours    PRN Inpatient Medications  acetaminophen     Tablet .. 650 milliGRAM(s) Oral every 6 hours PRN  acetaminophen     Tablet .. 975 milliGRAM(s) Oral every 6 hours PRN  guaiFENesin Oral Liquid (Sugar-Free) 100 milliGRAM(s) Oral every 6 hours PRN  ondansetron Injectable 4 milliGRAM(s) IV Push every 6 hours PRN      REVIEW OF SYSTEMS  --------------------------------------------------------------------------------  See HPI    VITALS/PHYSICAL EXAM  --------------------------------------------------------------------------------  T(C): 36.6 (06-09-23 @ 04:30), Max: 37.3 (06-08-23 @ 16:18)  HR: 81 (06-09-23 @ 07:45) (80 - 87)  BP: 151/58 (06-09-23 @ 07:45) (113/87 - 165/57)  RR: 18 (06-09-23 @ 04:30) (18 - 18)  SpO2: 95% (06-09-23 @ 04:30) (95% - 100%)  Wt(kg): --      06-08-23 @ 07:01  -  06-09-23 @ 07:00  --------------------------------------------------------  IN: 2300 mL / OUT: 525 mL / NET: 1775 mL    06-09-23 @ 07:01  -  06-09-23 @ 12:02  --------------------------------------------------------  IN: 150 mL / OUT: 25 mL / NET: 125 mL      Physical Exam:  Gen: elderly female, resting, NAD  	HEENT: Anicteric  	Pulm: CTA B/L  	CV: S1S2+  	Abd: Soft, +BS    	Ext: No LE edema B/L  	Neuro: Awake             : Baca+ with no urine, R nephrostomy tube with blood but no urine             Skin: Warm and dry      LABS/STUDIES  --------------------------------------------------------------------------------              8.9    9.65  >-----------<  215      [06-09-23 @ 07:00]              25.9     140  |  99  |  41  ----------------------------<  94      [06-09-23 @ 07:00]  3.9   |  23  |  10.85        Ca     8.2     [06-09-23 @ 07:00]      Mg     2.10     [06-09-23 @ 07:00]      Phos  5.8     [06-09-23 @ 07:00]    TPro  5.7  /  Alb  3.3  /  TBili  0.2  /  DBili  x   /  AST  14  /  ALT  6   /  AlkPhos  61  [06-08-23 @ 10:25]    PT/INR: PT 15.1 , INR 1.30       [06-07-23 @ 22:30]          [06-09-23 @ 07:00]    Creatinine Trend:  SCr 10.85 [06-09 @ 07:00]  SCr 14.02 [06-08 @ 10:25]  SCr 16.30 [06-07 @ 22:30]  SCr 16.51 [06-07 @ 10:30]  SCr 16.24 [06-06 @ 21:55]        Iron 32, TIBC 210, %sat 15      [06-07-23 @ 07:04]  Ferritin 151      [06-07-23 @ 07:04]    HBsAg Nonreact      [06-07-23 @ 07:04]  HCV 0.06, Nonreact      [06-07-23 @ 07:04]  HIV Nonreact      [06-07-23 @ 07:04]    ANCA: cANCA Negative, pANCA Negative, atypical ANCA Indeterminate Method interference due to ALESIA Fluorescence      [06-07-23 @ 07:04]

## 2023-06-09 NOTE — PROGRESS NOTE ADULT - PROBLEM SELECTOR PLAN 1
Pt with advanced kidney failure and no KINJAL in the setting of GI fluid losses (diarrhea), decreased PO intake, meds (Valsartan-HCTZ) and chronic NSAIDs use. Exact duration and etiology of kidney failure remains unknown. Pt presented with melanotic stool/diarrhea past 1 week. SCr significantly elevated at 16.77 on ER labs. No prior labs available to review. No urine studies available. Baca placed in ER with no urine return noted. Pt with hx of chronic NSAIDs use. Pt likely with ?advanced CKD. CT scan with obstructive uropathy seen - moderate right-sided hydroureteronephrosis secondary to an obstructing soft tissue mass present. Pt underwent R nephrostomy tube placement by IR on 6/8/23, currently with minimal output. Plan for HD treatment today. Strict I/O. Avoid ACEi/ARBS/IV contrast/NSAIDs/Nephrotoxins. Dose meds as per egfr. Monitor labs.     If you have any questions, please feel free to contact me  Casandra Pleitez  Nephrology Fellow  [phone] / [employer] Teams(Preferred)  (After 5pm or on weekends please page the on-call fellow)

## 2023-06-09 NOTE — PROGRESS NOTE ADULT - PROBLEM SELECTOR PLAN 4
Pt with episodes of diarrhea for past ~1 week, with black stools/melena for 3 days, now with liquid mustard-yellow diarrhea. Pt with history of taking Aleve 2 tabs daily for the past 10 years in addition to baby ASA daily. Concerning for upper GIB 2/2 PUD, but can be from viral/bacterial gastroenteritis. Rectal exam with no gross blood or melena, FOBT positive.  - IV Protonix 80 mg x1 ordered stat. C/w IV Protonix 40 mg BID.  - Check C diff PCR, GI PCR, and stool cxs  - Repeat CBC overnight with H/H remaining stable with Hgb 10  - Monitor CBC at least daily, more frequently if recurrence of melena  - Monitor BMs  - Keep NPO for now pending IR eval  - Keep active T&S  - Hold pt's home baby ASA  - GI consult to be called during admission once pt more medically stable Pt with episodes of diarrhea for past ~1 week, with black stools/melena for 3 days, now with liquid mustard-yellow diarrhea. Pt with history of taking Aleve 2 tabs daily for the past 10 years in addition to baby ASA daily. Concerning for upper GIB 2/2 PUD, but can be from viral/bacterial gastroenteritis. Rectal exam with no gross blood or melena, FOBT positive.  - IV Protonix 80 mg x1 ordered stat. C/w IV Protonix 40 mg BID>> switched to PO 06/09  - Check C diff PCR, GI PCR, and stool cxs >> negative  - Repeat CBC overnight with H/H remaining stable with Hgb 10  - Monitor CBC at least daily, more frequently if recurrence of melena  - Monitor BMs  - Keep active T&S  - Hold pt's home baby ASA  - GI consult to be called during admission once pt more medically stable

## 2023-06-09 NOTE — PROGRESS NOTE ADULT - ASSESSMENT
I called Mrs. Vo and GIULIA to continue her current dose of coumadin   Assessment:   83y Female with acute renal failure, CT 6/6 showed moderate right-sided hydroureteronephrosis secondary to an obstructing soft tissue mass, now  s/p right nephrostomy tube placement on 6/8/23  in IR    Plan:  - Continue drainage, monitor output  - Trend H/H  - Flush drain 5cc NS qd  - Will need to follow up as an outpatient for routine 3 month nephrostomy tube exchange Outpatient IR office [phone]    [phone]

## 2023-06-09 NOTE — PROGRESS NOTE ADULT - PROBLEM SELECTOR PLAN 8
Pt on pravastatin at home.   - C/w atorvastatin 10 mg daily (therapeutic interchange) - C/w Synthroid 100 mcg daily  - Check TSH

## 2023-06-09 NOTE — PROGRESS NOTE ADULT - PROBLEM SELECTOR PLAN 7
BPs elevated to 170s-190s systolic on admission, likely in setting of severe renal failure. Pt on valsartan/HCTZ at home.  - Hold pt's home valsartan and HCTZ in setting of severe renal failure  - Monitor VS q4hrs  - Will start CCB or hydralazine if SBPs persistently elevated >140s Pt on pravastatin at home.   - C/w atorvastatin 10 mg daily (therapeutic interchange)

## 2023-06-09 NOTE — PROGRESS NOTE ADULT - SUBJECTIVE AND OBJECTIVE BOX
PROGRESS NOTE:   Authored by Bowen Noland, PGY-1     Patient is a 83y old  Female who presents with a chief complaint of Nausea, diarrhea, and renal failure (08 Jun 2023 15:41)      SUBJECTIVE / OVERNIGHT EVENTS:  NAEON. Pt denied fever, chill, chest pain , sob, n/v.     ADDITIONAL REVIEW OF SYSTEMS:    MEDICATIONS  (STANDING):  amLODIPine   Tablet 5 milliGRAM(s) Oral daily  atorvastatin 10 milliGRAM(s) Oral at bedtime  chlorhexidine 2% Cloths 1 Application(s) Topical daily  cyanocobalamin 1000 MICROGram(s) Oral daily  levothyroxine 100 MICROGram(s) Oral daily  pantoprazole  Injectable 40 milliGRAM(s) IV Push every 12 hours    MEDICATIONS  (PRN):  acetaminophen     Tablet .. 650 milliGRAM(s) Oral every 6 hours PRN Temp greater or equal to 38C (100.4F), Mild Pain (1 - 3), Moderate Pain (4 - 6)  acetaminophen     Tablet .. 975 milliGRAM(s) Oral every 6 hours PRN Severe Pain (7 - 10)  ondansetron Injectable 4 milliGRAM(s) IV Push every 6 hours PRN Nausea and/or Vomiting      CAPILLARY BLOOD GLUCOSE        I&O's Summary    07 Jun 2023 07:01  -  08 Jun 2023 07:00  --------------------------------------------------------  IN: 2475 mL / OUT: 1170 mL / NET: 1305 mL    08 Jun 2023 07:01  -  09 Jun 2023 06:53  --------------------------------------------------------  IN: 2300 mL / OUT: 500 mL / NET: 1800 mL        PHYSICAL EXAM:  Vital Signs Last 24 Hrs  T(C): 36.6 (09 Jun 2023 04:30), Max: 37.3 (08 Jun 2023 16:18)  T(F): 97.9 (09 Jun 2023 04:30), Max: 99.1 (08 Jun 2023 16:18)  HR: 82 (09 Jun 2023 00:39) (78 - 87)  BP: 153/61 (09 Jun 2023 04:30) (113/87 - 165/57)  BP(mean): --  RR: 18 (09 Jun 2023 04:30) (18 - 19)  SpO2: 95% (09 Jun 2023 04:30) (95% - 100%)    Parameters below as of 09 Jun 2023 04:30  Patient On (Oxygen Delivery Method): room air      General: Awake and alert.  No acute distress.  Head: Normocephalic, atraumatic.    Eyes: PERRL.  EOMI.  No scleral icterus.  No conjunctival pallor.  Mouth: Moist MM.  No oropharyngeal exudates.    Neck: Supple.  Full range of motion.  No JVD.  No LAD.  No thyromegaly.  Trachea midline.    Heart: RRR.  Normal S1 and S2.  No murmurs, rubs, or gallops.  No LE edema b/l.   Lungs: Nonlabored breathing.  Good inspiratory effort.  CTAB.  No wheezes, crackles, or rhonchi.    Abdomen: BS+, soft, nontender with no rebound or guarding, nondistended.  No hepatomegaly.   Genitourinary: Baca catheter in place with no urine in Baca bag.  Skin: Warm and dry.  No rashes.  Extremities: No cyanosis.  2+ peripheral pulses b/l.  Musculoskeletal: No joint deformities.  No spinal or paraspinal tenderness.  Neuro: A&Ox3.  CN II-XII intact.  5/5 motor strength in UE and LE b/l.  Tactile sensation intact in UE and LE b/l.  No pronator drift b/l.  No focal deficits.      LABS:                        9.0    9.53  )-----------( 233      ( 08 Jun 2023 10:25 )             25.5     06-08    140  |  97<L>  |  60<H>  ----------------------------<  111<H>  3.7   |  23  |  14.02<H>    Ca    8.3<L>      08 Jun 2023 10:25  Phos  7.1     06-08  Mg     2.20     06-08    TPro  5.7<L>  /  Alb  3.3  /  TBili  0.2  /  DBili  x   /  AST  14  /  ALT  6   /  AlkPhos  61  06-08    PT/INR - ( 07 Jun 2023 22:30 )   PT: 15.1 sec;   INR: 1.30 ratio                   Culture - Stool (collected 06 Jun 2023 23:40)  Source: .Stool Feces  Preliminary Report (08 Jun 2023 08:02):    No enteric pathogens to date: Final culture pending        RADIOLOGY & ADDITIONAL TESTS:  Results Reviewed:   Imaging Personally Reviewed:  Electrocardiogram Personally Reviewed:    COORDINATION OF CARE:  Care Discussed with Consultants/Other Providers [Y/N]:  Prior or Outpatient Records Reviewed [Y/N]:

## 2023-06-09 NOTE — PROGRESS NOTE ADULT - PROBLEM SELECTOR PLAN 3
CTAP noncontrast demonstrating moderate right-sided hydroureteronephrosis, with obstruction proximal to the UVJ in the region of a 3.6 x 2.9 x 4.6 cm bulbous soft tissue mass inseparable from the cervical remnant, mild left-sided hydroureteronephrosis to the level of the UVJ, and pelvic and RP lymphadenopathy concerning for metastatic disease. Pt with history of high-grade TAYLER of cervix requiring MILVIA/BSO in 2008.  - CT chest noncontrast ordered to r/o mets  - CTH noncontrast also ordered to r/o mets and given nausea, though of limited utility due to lack of contrast. Possibly defer CTH after initiation of HD to perform study with contrast.  - Gynecology paged x2 for consult overnight for possible biopsy of mass CTAP noncontrast demonstrating moderate right-sided hydroureteronephrosis, with obstruction proximal to the UVJ in the region of a 3.6 x 2.9 x 4.6 cm bulbous soft tissue mass inseparable from the cervical remnant, mild left-sided hydroureteronephrosis to the level of the UVJ, and pelvic and RP lymphadenopathy concerning for metastatic disease. Pt with history of high-grade TAYLER of cervix requiring MILVIA/BSO in 2008.  - CT chest noncontrast ordered to r/o mets > no lung mets  - CTH noncontrast also ordered to r/o mets and given nausea, though of limited utility due to lack of contrast. Possibly defer CTH after initiation of HD to perform study with contrast.  >> no intracranial mass or bleeding  - s/p vaginal mass biopsy by gyn and pending path

## 2023-06-09 NOTE — PROGRESS NOTE ADULT - PROBLEM SELECTOR PLAN 2
Serum HCO3 14 with pH 7.16 on admission. In setting of severe renal failure.   - Pt started on bicarb infusion at 75 cc/hr as per Renal recs. C/w bicarb infusion overnight.  - Repeat BMP and VBG checked overnight, with slight improvement in acidosis, with serum HCO3 15 and pH 7.21  - Monitor BMP and VBG at least q8hrs for now Resolved, off sodium bicarb drip  - Serum HCO3 14 with pH 7.16 on admission. In setting of severe renal failure.   - Pt started on bicarb infusion at 75 cc/hr as per Renal recs. C/w bicarb infusion overnight.  - Monitor BMP and VBG qd

## 2023-06-09 NOTE — PROGRESS NOTE ADULT - SUBJECTIVE AND OBJECTIVE BOX
IR Progress Note     83y Female s/p right nephrostomy tube placement on 6/8/23  in Interventional Radiology.     Patient seen and examined at bedside. Pt lying comfortably in bed. Patient states pain well controlled. Patient with blood-tinged urine output from tube. H/H stable. VSS. Pt denies dizziness/lightheadedness    T(F): 97.6 (06-09-23 @ 11:45), Max: 99.1 (06-08-23 @ 16:18)  HR: 81 (06-09-23 @ 11:45) (80 - 87)  BP: 157/71 (06-09-23 @ 11:45) (113/87 - 165/57)  RR: 18 (06-09-23 @ 11:45) (18 - 18)  SpO2: 98% (06-09-23 @ 11:45) (95% - 100%)  Wt(kg): --    LABS:                        8.9    9.65  )-----------( 215      ( 09 Jun 2023 07:00 )             25.9     06-09    140  |  99  |  41<H>  ----------------------------<  94  3.9   |  23  |  10.85<H>    Ca    8.2<L>      09 Jun 2023 07:00  Phos  5.8     06-09  Mg     2.10     06-09    TPro  5.7<L>  /  Alb  3.3  /  TBili  0.2  /  DBili  x   /  AST  14  /  ALT  6   /  AlkPhos  61  06-08    PT/INR - ( 07 Jun 2023 22:30 )   PT: 15.1 sec;   INR: 1.30 ratio           I&O's Detail    08 Jun 2023 07:01  -  09 Jun 2023 07:00  --------------------------------------------------------  IN:    IV PiggyBack: 50 mL    Oral Fluid: 350 mL    Other (mL): 1300 mL    Sodium Bicarbonate: 600 mL  Total IN: 2300 mL    OUT:    Indwelling Catheter - Urethral (mL): 0 mL    Nephrostomy Tube (mL): 125 mL    Other (mL): 400 mL  Total OUT: 525 mL    Total NET: 1775 mL      09 Jun 2023 07:01  -  09 Jun 2023 15:37  --------------------------------------------------------  IN:    Oral Fluid: 150 mL  Total IN: 150 mL    OUT:    Nephrostomy Tube (mL): 25 mL  Total OUT: 25 mL    Total NET: 125 mL            PHYSICAL EXAM:  General: NAD, lying comfortably in bed  Lung: non-labored breathing  : right nephrostomy tube in place with blood-tinged urine output, dressing C/D/I, flushed with 5cc sterile saline

## 2023-06-09 NOTE — PROGRESS NOTE ADULT - PROBLEM SELECTOR PLAN 9
- C/w Synthroid 100 mcg daily  - Check TSH - DVT ppx: Hold pharmacologic ppx for now given possible GIB. SCDs.   - Diet: NPO pending KARON reynolds

## 2023-06-09 NOTE — PROGRESS NOTE ADULT - ASSESSMENT
82 yo woman with history of HTN, HLD, hypothyroidism, and high-grade squamous intraepithelial lesion s/p MILVIA/BSO (2008) presents with nausea and diarrhea, including with melena, for past ~1 week. Found to be in acute renal failure 2/2 an obstructing soft tissue mass inseparable from the cervical remnant and proximal to the UVJ, also with possible GIB. 82 yo woman with history of HTN, HLD, hypothyroidism, and high-grade squamous intraepithelial lesion s/p MILVIA/BSO (2008) presents with nausea and diarrhea, including with melena, for past ~1 week. Found to be in acute renal failure 2/2 an obstructing soft tissue mass inseparable from the cervical remnant and proximal to the UVJ, also with possible GIB. s/p Non-tunneled catheter for HD and R nephrostomy tube placement for R side obstructive uropathy from pelvic mass.

## 2023-06-09 NOTE — PROGRESS NOTE ADULT - ATTENDING COMMENTS
Chart reviewed. Vitals and Labs noted.   Pt seen and examined at bedside. Plan formulated with the resident. Detail H&P as above.     Dx:   Acute renal failure, metabolic acidosis.  GI symptoms likely renal related: neg GI PCR and neg c.diff.   Obstructive uropathy/ hydronephrosis due to obstructing soft tissue mass, with possible component of ATN/AIN given daily NSAID intake.   Renal and urology eval appreciated.  monitor urine output and Cr. remain oligouric. s/p bicarb drip.   s/p Shiley and right nephrostomy placement given CT finding of soft tissue mass, causing b/l hydronephrosis.  (will repeat US renal at a later date to monitor left hydro). nam in place, anuric.   GYN eval. s/p biopsy, path pending.  remote hx of malignancy 2008.  lymph noted noted on CT.   hold ARB/HCTZ. Added Norvasc for BP control, BP improved with dialysis  DVT ppx    d/w the daughter and the grandson at bedside. all questions answered.     - Dr. SARTHAK Boone ([facility])  - [phone]

## 2023-06-09 NOTE — PROGRESS NOTE ADULT - PROBLEM SELECTOR PLAN 6
Lipase elevated to 870. CTAP noncontrast showing unremarkable pancreas. Pt without any abdominal pain or tenderness on exam. Low suspicion for acute pancreatitis at this time, more likely in setting of severe renal failure and possibly from gastroenteritis.  - S/p 2L bolus of NS in ED. Will hold off on additional IVF for now, as pt not making any urine.  - Trend lipase for now BPs elevated to 170s-190s systolic on admission, likely in setting of severe renal failure. Pt on valsartan/HCTZ at home.  - Hold pt's home valsartan and HCTZ in setting of severe renal failure  - Monitor VS q8hrs  - can start hydralyzine PRN if sbp > 180

## 2023-06-09 NOTE — PROGRESS NOTE ADULT - PROBLEM SELECTOR PLAN 5
Hgb 10.3 initially, 10 on repeat. Likely multifactorial, including from possible GIB, anemia of chronic disease, and severe renal failure.  - Plan as above for GIB  - Check iron studies, folate, vitamin B12, retic count, LDH, and haptoglobin  - Pt reporting new ALLEN, suspect 2/2 anemia. Pt not grossly fluid overloaded on exam despite severe renal failure. Will check TTE to evaluate for structural heart failure. >> TTE grossly normal LVSF with EF 68% Hgb 10.3 initially, 10 on repeat. Likely multifactorial, including from possible GIB, anemia of chronic disease, and severe renal failure.  - Plan as above for GIB  - Check iron studies, folate, vitamin B12, retic count, LDH, and haptoglobin >> mixed picture of MARINE and anemia from chronic disease  - Pt reporting new ALLEN, suspect 2/2 anemia. Pt not grossly fluid overloaded on exam despite severe renal failure. Will check TTE to evaluate for structural heart failure. >> TTE grossly normal LVSF with EF 68%

## 2023-06-09 NOTE — PROGRESS NOTE ADULT - PROBLEM SELECTOR PLAN 1
Serum Cr 16.77 and BUN 83. Unknown baseline renal function, as no prior labs available. Duration of renal failure also unclear, but suspect pt with history of CKD given pt's daily use of Aleve for past 10 years. Likely now with KINJAL on CKD from obstructive uropathy, GI fluid losses (diarrhea), and poor PO intake.   - Renal consulted on admission, appreciate recs  - CTAP noncontrast obtained overnight and demonstrating moderate right-sided hydroureteronephrosis, with obstruction proximal to the UVJ in the region of a 3.6 x 2.9 x 4.6 cm bulbous soft tissue mass inseparable from the cervical remnant, mild left-sided hydroureteronephrosis to the level of the UVJ, and pelvic and RP lymphadenopathy concerning for metastatic disease  - Urology consult called overnight for possible ureteral stent placement. F/u official recs.  - IR consult also ordered on admission, as pt might require nephrostomy tube placement instead  - S/p Baca catheter insertion in ED. C/w Baca catheter for now.  - Per Renal, no need for urgent HD tonight, plan for initiation of HD tomorrow. Vascular or IR consult to be called in AM for Shiley placement.   - US abdominal dopplers ordered to r/o renal artery stenosis  - UA, urine lytes, and urine protein/Cr ratio ordered, but pt currently with no urine output  - Check ALESIA, p-ANCA, c-ANCA, anti-GBM Ab, HBsAg, Hep C Ab, HIV, Parvovirus, C3, C4, SPEP, serum immunofixation, serum free light chains, and anti-PLA2R Ab  - C/w bicarb infusion as below  - Monitor serum Cr and urine output with strict I&Os  - Avoid NSAIDs, ACEi/ARBs, contrast, and nephrotoxic agents   - Renally dose meds Serum Cr 16.77 and BUN 83. Unknown baseline renal function, as no prior labs available. Duration of renal failure also unclear, but suspect pt with history of CKD given pt's daily use of Aleve for past 10 years. Likely now with KINJAL on - CKD from obstructive uropathy, GI fluid losses (diarrhea), and poor PO intake.   - CTAP noncontrast obtained overnight and demonstrating moderate right-sided hydroureteronephrosis, with obstruction proximal to the UVJ in the region of a 3.6 x 2.9 x 4.6 cm bulbous soft tissue mass inseparable from the cervical remnant, mild left-sided hydroureteronephrosis to the level of the UVJ, and pelvic and RP lymphadenopathy concerning for metastatic disease  - Non-tunneled catheter placed 06/06, s/p 2nd HD.   - R nephrostomy tube placed 06/08  - S/p Baca catheter insertion in ED. C/w Baca catheter for now.  - US abdominal dopplers ordered to r/o renal artery stenosis >> negative  - UA, urine lytes, and urine protein/Cr ratio ordered, but pt currently with no urine output  - Check ALESIA, p-ANCA, c-ANCA, anti-GBM Ab, HBsAg, Hep C Ab, HIV, Parvovirus, C3, C4, SPEP, serum immunofixation, serum free light chains, and anti-PLA2R Ab  - dc sodium bicarb   - Monitor serum Cr and urine output with strict I&Os  - Avoid NSAIDs, ACEi/ARBs, contrast, and nephrotoxic agents   - Renally dose meds

## 2023-06-10 LAB
ANION GAP SERPL CALC-SCNC: 14 MMOL/L — SIGNIFICANT CHANGE UP (ref 7–14)
BUN SERPL-MCNC: 25 MG/DL — HIGH (ref 7–23)
CALCIUM SERPL-MCNC: 8.2 MG/DL — LOW (ref 8.4–10.5)
CHLORIDE SERPL-SCNC: 99 MMOL/L — SIGNIFICANT CHANGE UP (ref 98–107)
CO2 SERPL-SCNC: 26 MMOL/L — SIGNIFICANT CHANGE UP (ref 22–31)
CREAT SERPL-MCNC: 7.76 MG/DL — HIGH (ref 0.5–1.3)
EGFR: 5 ML/MIN/1.73M2 — LOW
GAS PNL BLDV: SIGNIFICANT CHANGE UP
GLUCOSE SERPL-MCNC: 90 MG/DL — SIGNIFICANT CHANGE UP (ref 70–99)
HAPTOGLOB SERPL-MCNC: 178 MG/DL — SIGNIFICANT CHANGE UP (ref 34–200)
HCT VFR BLD CALC: 26.6 % — LOW (ref 34.5–45)
HGB BLD-MCNC: 8.8 G/DL — LOW (ref 11.5–15.5)
LACTATE BLDV-MCNC: 1 MMOL/L — SIGNIFICANT CHANGE UP (ref 0.5–2)
LDH SERPL L TO P-CCNC: 237 U/L — HIGH (ref 135–225)
MAGNESIUM SERPL-MCNC: 1.9 MG/DL — SIGNIFICANT CHANGE UP (ref 1.6–2.6)
MCHC RBC-ENTMCNC: 31.1 PG — SIGNIFICANT CHANGE UP (ref 27–34)
MCHC RBC-ENTMCNC: 33.1 GM/DL — SIGNIFICANT CHANGE UP (ref 32–36)
MCV RBC AUTO: 94 FL — SIGNIFICANT CHANGE UP (ref 80–100)
NRBC # BLD: 0 /100 WBCS — SIGNIFICANT CHANGE UP (ref 0–0)
NRBC # FLD: 0 K/UL — SIGNIFICANT CHANGE UP (ref 0–0)
PHOSPHATE SERPL-MCNC: 5.1 MG/DL — HIGH (ref 2.5–4.5)
PLATELET # BLD AUTO: 198 K/UL — SIGNIFICANT CHANGE UP (ref 150–400)
POTASSIUM SERPL-MCNC: 3.9 MMOL/L — SIGNIFICANT CHANGE UP (ref 3.5–5.3)
POTASSIUM SERPL-SCNC: 3.9 MMOL/L — SIGNIFICANT CHANGE UP (ref 3.5–5.3)
RBC # BLD: 2.83 M/UL — LOW (ref 3.8–5.2)
RBC # FLD: 11.9 % — SIGNIFICANT CHANGE UP (ref 10.3–14.5)
SODIUM SERPL-SCNC: 139 MMOL/L — SIGNIFICANT CHANGE UP (ref 135–145)
WBC # BLD: 9.83 K/UL — SIGNIFICANT CHANGE UP (ref 3.8–10.5)
WBC # FLD AUTO: 9.83 K/UL — SIGNIFICANT CHANGE UP (ref 3.8–10.5)

## 2023-06-10 RX ADMIN — PANTOPRAZOLE SODIUM 40 MILLIGRAM(S): 20 TABLET, DELAYED RELEASE ORAL at 06:11

## 2023-06-10 RX ADMIN — Medication 100 MICROGRAM(S): at 05:07

## 2023-06-10 RX ADMIN — AMLODIPINE BESYLATE 5 MILLIGRAM(S): 2.5 TABLET ORAL at 06:11

## 2023-06-10 RX ADMIN — ATORVASTATIN CALCIUM 10 MILLIGRAM(S): 80 TABLET, FILM COATED ORAL at 21:16

## 2023-06-10 RX ADMIN — PREGABALIN 1000 MICROGRAM(S): 225 CAPSULE ORAL at 12:17

## 2023-06-10 RX ADMIN — CHLORHEXIDINE GLUCONATE 1 APPLICATION(S): 213 SOLUTION TOPICAL at 12:18

## 2023-06-10 RX ADMIN — PANTOPRAZOLE SODIUM 40 MILLIGRAM(S): 20 TABLET, DELAYED RELEASE ORAL at 18:20

## 2023-06-10 RX ADMIN — Medication 100 MILLIGRAM(S): at 18:27

## 2023-06-10 NOTE — PROGRESS NOTE ADULT - PROBLEM SELECTOR PLAN 4
Pt with episodes of diarrhea for past ~1 week, with black stools/melena for 3 days, now with liquid mustard-yellow diarrhea. Pt with history of taking Aleve 2 tabs daily for the past 10 years in addition to baby ASA daily. Concerning for upper GIB 2/2 PUD, but can be from viral/bacterial gastroenteritis. Rectal exam with no gross blood or melena, FOBT positive.  - IV Protonix 80 mg x1 ordered stat. C/w IV Protonix 40 mg BID>> switched to PO 06/09  - Check C diff PCR, GI PCR, and stool cxs >> negative  - Repeat CBC overnight with H/H remaining stable with Hgb 10  - Monitor CBC at least daily, more frequently if recurrence of melena  - Monitor BMs  - Keep active T&S  - Hold pt's home baby ASA  - GI consult to be called during admission once pt more medically stable Hgb 10.3 initially, 10 on repeat. Likely multifactorial, including from possible GIB, anemia of chronic disease, and severe renal failure.  - Plan as above for GIB  - Check iron studies, folate, vitamin B12, retic count, LDH, and haptoglobin >> mixed picture of MARINE and anemia from chronic disease  - Pt reporting new ALLEN, suspect 2/2 anemia. Pt not grossly fluid overloaded on exam despite severe renal failure. Will check TTE to evaluate for structural heart failure. >> TTE grossly normal LVSF with EF 68%

## 2023-06-10 NOTE — PROGRESS NOTE ADULT - PROBLEM SELECTOR PLAN 7
Pt on pravastatin at home.   - C/w atorvastatin 10 mg daily (therapeutic interchange) - C/w Synthroid 100 mcg daily

## 2023-06-10 NOTE — PROGRESS NOTE ADULT - PROBLEM SELECTOR PLAN 3
CTAP noncontrast demonstrating moderate right-sided hydroureteronephrosis, with obstruction proximal to the UVJ in the region of a 3.6 x 2.9 x 4.6 cm bulbous soft tissue mass inseparable from the cervical remnant, mild left-sided hydroureteronephrosis to the level of the UVJ, and pelvic and RP lymphadenopathy concerning for metastatic disease. Pt with history of high-grade TAYLER of cervix requiring MILVIA/BSO in 2008.  - CT chest noncontrast ordered to r/o mets > no lung mets  - CTH noncontrast also ordered to r/o mets and given nausea, though of limited utility due to lack of contrast. Possibly defer CTH after initiation of HD to perform study with contrast.  >> no intracranial mass or bleeding  - s/p vaginal mass biopsy by gyn and pending path Pt with episodes of diarrhea for past ~1 week, with black stools/melena for 3 days, now with liquid mustard-yellow diarrhea. Pt with history of taking Aleve 2 tabs daily for the past 10 years in addition to baby ASA daily. Concerning for upper GIB 2/2 PUD, but can be from viral/bacterial gastroenteritis. Rectal exam with no gross blood or melena, FOBT positive.  - IV Protonix 80 mg x1 ordered stat. C/w IV Protonix 40 mg BID>> switched to PO 06/09  - Check C diff PCR, GI PCR, and stool cxs >> negative  - Repeat CBC overnight with H/H remaining stable with Hgb 10  - Monitor BMs  - Keep active T&S  - Hold pt's home baby ASA  - GI consulted - No inpatient EGD planned currently, possibly prior to discharge or as outpatient. Continue PPI BID x 4 weeks

## 2023-06-10 NOTE — PROGRESS NOTE ADULT - PROBLEM SELECTOR PLAN 5
Hgb 10.3 initially, 10 on repeat. Likely multifactorial, including from possible GIB, anemia of chronic disease, and severe renal failure.  - Plan as above for GIB  - Check iron studies, folate, vitamin B12, retic count, LDH, and haptoglobin >> mixed picture of MARINE and anemia from chronic disease  - Pt reporting new ALLEN, suspect 2/2 anemia. Pt not grossly fluid overloaded on exam despite severe renal failure. Will check TTE to evaluate for structural heart failure. >> TTE grossly normal LVSF with EF 68% BPs elevated to 170s-190s systolic on admission, likely in setting of severe renal failure. Pt on valsartan/HCTZ at home.  - Hold pt's home valsartan and HCTZ in setting of severe renal failure  - Monitor VS q8hrs  - can start hydralyzine PRN if sbp > 180

## 2023-06-10 NOTE — PROGRESS NOTE ADULT - ASSESSMENT
84 yo woman with history of HTN, HLD, hypothyroidism, and high-grade squamous intraepithelial lesion s/p MILVIA/BSO (2008) presents with nausea and diarrhea, including with melena, for past ~1 week. Found to be in acute renal failure 2/2 an obstructing soft tissue mass inseparable from the cervical remnant and proximal to the UVJ, also with possible GIB. s/p Non-tunneled catheter for HD and R nephrostomy tube placement for R side obstructive uropathy from pelvic mass.

## 2023-06-10 NOTE — PROGRESS NOTE ADULT - PROBLEM SELECTOR PLAN 6
BPs elevated to 170s-190s systolic on admission, likely in setting of severe renal failure. Pt on valsartan/HCTZ at home.  - Hold pt's home valsartan and HCTZ in setting of severe renal failure  - Monitor VS q8hrs  - can start hydralyzine PRN if sbp > 180 Pt on pravastatin at home.   - C/w atorvastatin 10 mg daily (therapeutic interchange)

## 2023-06-10 NOTE — PROGRESS NOTE ADULT - PROBLEM SELECTOR PLAN 8
- C/w Synthroid 100 mcg daily  - Check TSH - DVT ppx: Hold pharmacologic ppx for now given possible GIB. SCDs.   - Diet: DASH/TLC

## 2023-06-10 NOTE — PROGRESS NOTE ADULT - ATTENDING COMMENTS
Chart reviewed. Vitals and Labs noted.   Pt seen and examined at bedside. Plan formulated with the resident. Detail H&P as above.     Dx:   Acute renal failure, metabolic acidosis.  GI symptoms likely renal related: neg GI PCR and neg c.diff.   Obstructive uropathy/ hydronephrosis due to obstructing soft tissue mass, with possible component of ATN/AIN given daily NSAID intake.   Renal and urology eval appreciated.  monitor urine output and Cr. remain oligouric. s/p bicarb drip.   s/p Shiley and right nephrostomy placement given CT finding of soft tissue mass, causing b/l hydronephrosis.  (will repeat US renal at a later date to monitor left hydro). nam in place, anuric.   GYN eval. s/p biopsy, path pending.  remote hx of malignancy 2008.  lymph notes noted on CT.   hold ARB/HCTZ. Added Norvasc for BP control, BP improved with dialysis  DVT ppx    d/w the daughter.    - Dr. DE LEÓN Htet ([facility])  - [phone]

## 2023-06-10 NOTE — PROGRESS NOTE ADULT - PROBLEM SELECTOR PLAN 2
Resolved, off sodium bicarb drip  - Serum HCO3 14 with pH 7.16 on admission. In setting of severe renal failure.   - Pt started on bicarb infusion at 75 cc/hr as per Renal recs. C/w bicarb infusion overnight.  - Monitor BMP and VBG qd CTAP noncontrast demonstrating moderate right-sided hydroureteronephrosis, with obstruction proximal to the UVJ in the region of a 3.6 x 2.9 x 4.6 cm bulbous soft tissue mass inseparable from the cervical remnant, mild left-sided hydroureteronephrosis to the level of the UVJ, and pelvic and RP lymphadenopathy concerning for metastatic disease. Pt with history of high-grade TAYLER of cervix requiring MILVIA/BSO in 2008.  - CT chest noncontrast ordered to r/o mets > no lung mets  - CTH noncontrast also ordered to r/o mets and given nausea, though of limited utility due to lack of contrast. Possibly defer CTH after initiation of HD to perform study with contrast.  >> no intracranial mass or bleeding  - s/p vaginal mass biopsy by gyn and pending path

## 2023-06-10 NOTE — PROGRESS NOTE ADULT - SUBJECTIVE AND OBJECTIVE BOX
PROGRESS NOTE:   Authored by Catherine Cao MD  Pager [phone] [facility] | [phone] [facility]    Patient is a 83y old  Female who presents with a chief complaint of Nausea, diarrhea, and renal failure (09 Jun 2023 15:37)      SUBJECTIVE / OVERNIGHT EVENTS:    MEDICATIONS  (STANDING):  amLODIPine   Tablet 5 milliGRAM(s) Oral daily  atorvastatin 10 milliGRAM(s) Oral at bedtime  chlorhexidine 2% Cloths 1 Application(s) Topical daily  cyanocobalamin 1000 MICROGram(s) Oral daily  levothyroxine 100 MICROGram(s) Oral daily  pantoprazole    Tablet 40 milliGRAM(s) Oral two times a day    MEDICATIONS  (PRN):  acetaminophen     Tablet .. 650 milliGRAM(s) Oral every 6 hours PRN Temp greater or equal to 38C (100.4F), Mild Pain (1 - 3), Moderate Pain (4 - 6)  acetaminophen     Tablet .. 975 milliGRAM(s) Oral every 6 hours PRN Severe Pain (7 - 10)  guaiFENesin Oral Liquid (Sugar-Free) 100 milliGRAM(s) Oral every 6 hours PRN Cough  ondansetron Injectable 4 milliGRAM(s) IV Push every 6 hours PRN Nausea and/or Vomiting      I&O's Summary    08 Jun 2023 07:01  -  09 Jun 2023 07:00  --------------------------------------------------------  IN: 2300 mL / OUT: 525 mL / NET: 1775 mL    09 Jun 2023 07:01  -  10 Bowen 2023 06:51  --------------------------------------------------------  IN: 1390 mL / OUT: 2570 mL / NET: -1180 mL        PHYSICAL EXAM:  Vital Signs Last 24 Hrs  T(C): 36.8 (10 Bowen 2023 05:15), Max: 37.7 (09 Jun 2023 16:35)  T(F): 98.3 (10 Bowen 2023 05:15), Max: 99.9 (09 Jun 2023 16:35)  HR: 83 (10 Bowen 2023 05:15) (79 - 86)  BP: 155/70 (10 Bowen 2023 05:15) (144/62 - 162/72)  BP(mean): --  RR: 18 (10 Bowen 2023 05:15) (18 - 18)  SpO2: 95% (10 Bowen 2023 05:15) (95% - 98%)    Parameters below as of 10 Bowen 2023 05:15  Patient On (Oxygen Delivery Method): room air            LABS:                        8.9    9.65  )-----------( 215      ( 09 Jun 2023 07:00 )             25.9     06-09    140  |  99  |  41<H>  ----------------------------<  94  3.9   |  23  |  10.85<H>    Ca    8.2<L>      09 Jun 2023 07:00  Phos  5.8     06-09  Mg     2.10     06-09    TPro  5.7<L>  /  Alb  3.3  /  TBili  0.2  /  DBili  x   /  AST  14  /  ALT  6   /  AlkPhos  61  06-08              Culture - Urine (collected 08 Jun 2023 18:00)  Source: .Urine Right Kidney Urine  Preliminary Report (09 Jun 2023 17:12):    No growth to date.    Culture - Fungal, Other (collected 08 Jun 2023 18:00)  Source: .Other Right Kidney Urine  Preliminary Report (09 Jun 2023 07:33):    Testing in progress       PROGRESS NOTE:   Authored by Catherine Cao MD  Pager [phone] [facility] | [phone] [facility]    Patient is a 83y old  Female who presents with a chief complaint of Nausea, diarrhea, and renal failure (09 Jun 2023 15:37)      SUBJECTIVE / OVERNIGHT EVENTS:  Afebrile. HD yesterday. Some nausea, no emesis. States eating better.    MEDICATIONS  (STANDING):  amLODIPine   Tablet 5 milliGRAM(s) Oral daily  atorvastatin 10 milliGRAM(s) Oral at bedtime  chlorhexidine 2% Cloths 1 Application(s) Topical daily  cyanocobalamin 1000 MICROGram(s) Oral daily  levothyroxine 100 MICROGram(s) Oral daily  pantoprazole    Tablet 40 milliGRAM(s) Oral two times a day    MEDICATIONS  (PRN):  acetaminophen     Tablet .. 650 milliGRAM(s) Oral every 6 hours PRN Temp greater or equal to 38C (100.4F), Mild Pain (1 - 3), Moderate Pain (4 - 6)  acetaminophen     Tablet .. 975 milliGRAM(s) Oral every 6 hours PRN Severe Pain (7 - 10)  guaiFENesin Oral Liquid (Sugar-Free) 100 milliGRAM(s) Oral every 6 hours PRN Cough  ondansetron Injectable 4 milliGRAM(s) IV Push every 6 hours PRN Nausea and/or Vomiting      I&O's Summary    08 Jun 2023 07:01  -  09 Jun 2023 07:00  --------------------------------------------------------  IN: 2300 mL / OUT: 525 mL / NET: 1775 mL    09 Jun 2023 07:01  -  10 Bowen 2023 06:51  --------------------------------------------------------  IN: 1390 mL / OUT: 2570 mL / NET: -1180 mL        PHYSICAL EXAM:  Vital Signs Last 24 Hrs  T(C): 36.8 (10 Bowen 2023 05:15), Max: 37.7 (09 Jun 2023 16:35)  T(F): 98.3 (10 Bowen 2023 05:15), Max: 99.9 (09 Jun 2023 16:35)  HR: 83 (10 Bowen 2023 05:15) (79 - 86)  BP: 155/70 (10 Bowen 2023 05:15) (144/62 - 162/72)  BP(mean): --  RR: 18 (10 Bowen 2023 05:15) (18 - 18)  SpO2: 95% (10 Bowen 2023 05:15) (95% - 98%)    Parameters below as of 10 Bowen 2023 05:15  Patient On (Oxygen Delivery Method): room air    General: Awake and alert.  No acute distress.  Head: Normocephalic, atraumatic.    Eyes: PERRL.  EOMI.  No scleral icterus.  No conjunctival pallor.  Mouth: Moist MM.  No oropharyngeal exudates.    Neck: Supple.  Full range of motion.  No JVD.  No LAD.  No thyromegaly.  Trachea midline.    Heart: RRR.  Normal S1 and S2.  No murmurs, rubs, or gallops.  No LE edema b/l.   Lungs: Nonlabored breathing.  Good inspiratory effort.  CTAB.  No wheezes, crackles, or rhonchi.    Abdomen: BS+, soft, nontender with no rebound or guarding, nondistended.  No hepatomegaly.   Genitourinary: Nephrostomy tube, recently emptied, with evidence of hematuria  Skin: Warm and dry.  No rashes.  Extremities: No cyanosis.  2+ peripheral pulses b/l.  Neuro: A&Ox3.  CNs grossly intact. No focal deficits.          LABS:                        8.9    9.65  )-----------( 215      ( 09 Jun 2023 07:00 )             25.9     06-09    140  |  99  |  41<H>  ----------------------------<  94  3.9   |  23  |  10.85<H>    Ca    8.2<L>      09 Jun 2023 07:00  Phos  5.8     06-09  Mg     2.10     06-09    TPro  5.7<L>  /  Alb  3.3  /  TBili  0.2  /  DBili  x   /  AST  14  /  ALT  6   /  AlkPhos  61  06-08              Culture - Urine (collected 08 Jun 2023 18:00)  Source: .Urine Right Kidney Urine  Preliminary Report (09 Jun 2023 17:12):    No growth to date.    Culture - Fungal, Other (collected 08 Jun 2023 18:00)  Source: .Other Right Kidney Urine  Preliminary Report (09 Jun 2023 07:33):    Testing in progress

## 2023-06-10 NOTE — PROGRESS NOTE ADULT - PROBLEM SELECTOR PLAN 1
Serum Cr 16.77 and BUN 83. Unknown baseline renal function, as no prior labs available. Duration of renal failure also unclear, but suspect pt with history of CKD given pt's daily use of Aleve for past 10 years. Likely now with KINJAL on - CKD from obstructive uropathy, GI fluid losses (diarrhea), and poor PO intake.   - CTAP noncontrast obtained overnight and demonstrating moderate right-sided hydroureteronephrosis, with obstruction proximal to the UVJ in the region of a 3.6 x 2.9 x 4.6 cm bulbous soft tissue mass inseparable from the cervical remnant, mild left-sided hydroureteronephrosis to the level of the UVJ, and pelvic and RP lymphadenopathy concerning for metastatic disease  - Non-tunneled catheter placed 06/06, s/p 2nd HD.   - R nephrostomy tube placed 06/08  - S/p Baca catheter insertion in ED. C/w Baca catheter for now.  - US abdominal dopplers ordered to r/o renal artery stenosis >> negative  - UA, urine lytes, and urine protein/Cr ratio ordered, but pt currently with no urine output  - Check ALESIA, p-ANCA, c-ANCA, anti-GBM Ab, HBsAg, Hep C Ab, HIV, Parvovirus, C3, C4, SPEP, serum immunofixation, serum free light chains, and anti-PLA2R Ab  - dc sodium bicarb   - Monitor serum Cr and urine output with strict I&Os  - Avoid NSAIDs, ACEi/ARBs, contrast, and nephrotoxic agents   - Renally dose meds Serum Cr 16.77 and BUN 83 at presentation. Unknown baseline renal function, as no prior labs available. Concern for KINJAL on - CKD from obstructive uropathy, GI fluid losses (diarrhea), and poor PO intake.   - CTAP noncontrast obtained overnight and demonstrating moderate right-sided hydroureteronephrosis, with obstruction proximal to the UVJ in the region of a 3.6 x 2.9 x 4.6 cm bulbous soft tissue mass inseparable from the cervical remnant, mild left-sided hydroureteronephrosis to the level of the UVJ, and pelvic and RP lymphadenopathy concerning for metastatic disease  - Non-tunneled catheter placed 06/06, s/p 2nd HD.   - R nephrostomy tube placed 06/08  - S/p Baca catheter insertion in ED. C/w Baca catheter for now.  - US abdominal dopplers ordered to r/o renal artery stenosis >> negative  - UA, urine lytes, and urine protein/Cr ratio ordered, but pt currently with no urine output  - Check ALESIA, p-ANCA, c-ANCA, anti-GBM Ab, HBsAg, Hep C Ab, HIV, Parvovirus, C3, C4, SPEP, serum immunofixation, serum free light chains, and anti-PLA2R Ab  - dc sodium bicarb   - Monitor serum Cr and urine output with strict I&Os  - Avoid NSAIDs, ACEi/ARBs, contrast, and nephrotoxic agents   - Renally dose meds

## 2023-06-11 LAB
ANION GAP SERPL CALC-SCNC: 15 MMOL/L — HIGH (ref 7–14)
BUN SERPL-MCNC: 36 MG/DL — HIGH (ref 7–23)
CALCIUM SERPL-MCNC: 8.4 MG/DL — SIGNIFICANT CHANGE UP (ref 8.4–10.5)
CHLORIDE SERPL-SCNC: 98 MMOL/L — SIGNIFICANT CHANGE UP (ref 98–107)
CO2 SERPL-SCNC: 23 MMOL/L — SIGNIFICANT CHANGE UP (ref 22–31)
CREAT SERPL-MCNC: 9.81 MG/DL — HIGH (ref 0.5–1.3)
EGFR: 4 ML/MIN/1.73M2 — LOW
GAS PNL BLDV: SIGNIFICANT CHANGE UP
GLUCOSE SERPL-MCNC: 94 MG/DL — SIGNIFICANT CHANGE UP (ref 70–99)
HAPTOGLOB SERPL-MCNC: 186 MG/DL — SIGNIFICANT CHANGE UP (ref 34–200)
HCT VFR BLD CALC: 27.1 % — LOW (ref 34.5–45)
HGB BLD-MCNC: 8.9 G/DL — LOW (ref 11.5–15.5)
LACTATE BLDV-MCNC: 1.6 MMOL/L — SIGNIFICANT CHANGE UP (ref 0.5–2)
LDH SERPL L TO P-CCNC: 267 U/L — HIGH (ref 135–225)
MAGNESIUM SERPL-MCNC: 2.1 MG/DL — SIGNIFICANT CHANGE UP (ref 1.6–2.6)
MCHC RBC-ENTMCNC: 30.5 PG — SIGNIFICANT CHANGE UP (ref 27–34)
MCHC RBC-ENTMCNC: 32.8 GM/DL — SIGNIFICANT CHANGE UP (ref 32–36)
MCV RBC AUTO: 92.8 FL — SIGNIFICANT CHANGE UP (ref 80–100)
NRBC # BLD: 0 /100 WBCS — SIGNIFICANT CHANGE UP (ref 0–0)
NRBC # FLD: 0 K/UL — SIGNIFICANT CHANGE UP (ref 0–0)
PHOSPHATE SERPL-MCNC: 6 MG/DL — HIGH (ref 2.5–4.5)
PLATELET # BLD AUTO: 223 K/UL — SIGNIFICANT CHANGE UP (ref 150–400)
POTASSIUM SERPL-MCNC: 3.8 MMOL/L — SIGNIFICANT CHANGE UP (ref 3.5–5.3)
POTASSIUM SERPL-SCNC: 3.8 MMOL/L — SIGNIFICANT CHANGE UP (ref 3.5–5.3)
RBC # BLD: 2.92 M/UL — LOW (ref 3.8–5.2)
RBC # FLD: 11.9 % — SIGNIFICANT CHANGE UP (ref 10.3–14.5)
SODIUM SERPL-SCNC: 136 MMOL/L — SIGNIFICANT CHANGE UP (ref 135–145)
WBC # BLD: 10.27 K/UL — SIGNIFICANT CHANGE UP (ref 3.8–10.5)
WBC # FLD AUTO: 10.27 K/UL — SIGNIFICANT CHANGE UP (ref 3.8–10.5)

## 2023-06-11 RX ORDER — HEPARIN SODIUM 5000 [USP'U]/ML
5000 INJECTION INTRAVENOUS; SUBCUTANEOUS EVERY 12 HOURS
Refills: 0 | Status: DISCONTINUED | OUTPATIENT
Start: 2023-06-11 | End: 2023-06-22

## 2023-06-11 RX ORDER — SENNA PLUS 8.6 MG/1
2 TABLET ORAL AT BEDTIME
Refills: 0 | Status: DISCONTINUED | OUTPATIENT
Start: 2023-06-11 | End: 2023-06-22

## 2023-06-11 RX ORDER — POLYETHYLENE GLYCOL 3350 17 G/17G
17 POWDER, FOR SOLUTION ORAL DAILY
Refills: 0 | Status: DISCONTINUED | OUTPATIENT
Start: 2023-06-11 | End: 2023-06-22

## 2023-06-11 RX ADMIN — PANTOPRAZOLE SODIUM 40 MILLIGRAM(S): 20 TABLET, DELAYED RELEASE ORAL at 05:32

## 2023-06-11 RX ADMIN — HEPARIN SODIUM 5000 UNIT(S): 5000 INJECTION INTRAVENOUS; SUBCUTANEOUS at 17:43

## 2023-06-11 RX ADMIN — CHLORHEXIDINE GLUCONATE 1 APPLICATION(S): 213 SOLUTION TOPICAL at 12:23

## 2023-06-11 RX ADMIN — Medication 600 MILLIGRAM(S): at 22:04

## 2023-06-11 RX ADMIN — ATORVASTATIN CALCIUM 10 MILLIGRAM(S): 80 TABLET, FILM COATED ORAL at 21:31

## 2023-06-11 RX ADMIN — AMLODIPINE BESYLATE 5 MILLIGRAM(S): 2.5 TABLET ORAL at 05:32

## 2023-06-11 RX ADMIN — PANTOPRAZOLE SODIUM 40 MILLIGRAM(S): 20 TABLET, DELAYED RELEASE ORAL at 17:39

## 2023-06-11 RX ADMIN — PREGABALIN 1000 MICROGRAM(S): 225 CAPSULE ORAL at 12:23

## 2023-06-11 RX ADMIN — Medication 100 MICROGRAM(S): at 05:31

## 2023-06-11 NOTE — PROGRESS NOTE ADULT - PROBLEM SELECTOR PLAN 3
Pt with episodes of diarrhea for past ~1 week, with black stools/melena for 3 days, now with liquid mustard-yellow diarrhea. Pt with history of taking Aleve 2 tabs daily for the past 10 years in addition to baby ASA daily. Concerning for upper GIB 2/2 PUD, but can be from viral/bacterial gastroenteritis. Rectal exam with no gross blood or melena, FOBT positive.  - IV Protonix 80 mg x1 ordered stat. C/w IV Protonix 40 mg BID>> switched to PO 06/09  - Check C diff PCR, GI PCR, and stool cxs >> negative  - Repeat CBC overnight with H/H remaining stable with Hgb 10  - Monitor BMs  - Keep active T&S  - Hold pt's home baby ASA  - GI consulted - No inpatient EGD planned currently, possibly prior to discharge or as outpatient. Continue PPI BID x 4 weeks

## 2023-06-11 NOTE — PROGRESS NOTE ADULT - SUBJECTIVE AND OBJECTIVE BOX
PROGRESS NOTE:   Authored by Bowen Noland, PGY-1     Patient is a 83y old  Female who presents with a chief complaint of Nausea, diarrhea, and renal failure (10 Bowen 2023 06:51)      SUBJECTIVE / OVERNIGHT EVENTS:  NAEON. Pt denied fever, chill, chest pain, sob, n/v.   ADDITIONAL REVIEW OF SYSTEMS:    MEDICATIONS  (STANDING):  amLODIPine   Tablet 5 milliGRAM(s) Oral daily  atorvastatin 10 milliGRAM(s) Oral at bedtime  chlorhexidine 2% Cloths 1 Application(s) Topical daily  cyanocobalamin 1000 MICROGram(s) Oral daily  levothyroxine 100 MICROGram(s) Oral daily  pantoprazole    Tablet 40 milliGRAM(s) Oral two times a day    MEDICATIONS  (PRN):  acetaminophen     Tablet .. 650 milliGRAM(s) Oral every 6 hours PRN Temp greater or equal to 38C (100.4F), Mild Pain (1 - 3), Moderate Pain (4 - 6)  acetaminophen     Tablet .. 975 milliGRAM(s) Oral every 6 hours PRN Severe Pain (7 - 10)  guaiFENesin Oral Liquid (Sugar-Free) 100 milliGRAM(s) Oral every 6 hours PRN Cough  ondansetron Injectable 4 milliGRAM(s) IV Push every 6 hours PRN Nausea and/or Vomiting      CAPILLARY BLOOD GLUCOSE        I&O's Summary    09 Jun 2023 07:01  -  10 Bowen 2023 07:00  --------------------------------------------------------  IN: 1390 mL / OUT: 2570 mL / NET: -1180 mL    10 Bowen 2023 07:01  -  11 Jun 2023 06:43  --------------------------------------------------------  IN: 150 mL / OUT: 475 mL / NET: -325 mL        PHYSICAL EXAM:  Vital Signs Last 24 Hrs  T(C): 36.8 (11 Jun 2023 05:29), Max: 37 (10 Bowen 2023 21:14)  T(F): 98.2 (11 Jun 2023 05:29), Max: 98.6 (10 Bowen 2023 21:14)  HR: 78 (11 Jun 2023 05:29) (78 - 86)  BP: 164/63 (11 Jun 2023 05:29) (144/91 - 164/63)  BP(mean): --  RR: 18 (11 Jun 2023 05:29) (18 - 18)  SpO2: 95% (11 Jun 2023 05:29) (95% - 98%)    Parameters below as of 11 Jun 2023 05:29  Patient On (Oxygen Delivery Method): room air        General: Awake and alert.  No acute distress.  Head: Normocephalic, atraumatic.    Eyes: PERRL.  EOMI.  No scleral icterus.  No conjunctival pallor.  Mouth: Moist MM.  No oropharyngeal exudates.    Neck: Supple.  Full range of motion.  No JVD.  No LAD.  No thyromegaly.  Trachea midline.    Heart: RRR.  Normal S1 and S2.  No murmurs, rubs, or gallops.  No LE edema b/l.   Lungs: Nonlabored breathing.  Good inspiratory effort.  CTAB.  No wheezes, crackles, or rhonchi.    Abdomen: BS+, soft, nontender with no rebound or guarding, nondistended.  No hepatomegaly.   Genitourinary: Nephrostomy tube, recently emptied, with evidence of hematuria  Skin: Warm and dry.  No rashes.  Extremities: No cyanosis.  2+ peripheral pulses b/l.  Neuro: A&Ox3.  CNs grossly intact. No focal deficits.      LABS:                        8.8    9.83  )-----------( 198      ( 10 Bowen 2023 06:15 )             26.6     06-10    139  |  99  |  25<H>  ----------------------------<  90  3.9   |  26  |  7.76<H>    Ca    8.2<L>      10 Bowen 2023 06:15  Phos  5.1     06-10  Mg     1.90     06-10                Culture - Urine (collected 08 Jun 2023 18:00)  Source: .Urine Right Kidney Urine  Final Report (10 Bowen 2023 20:22):    No growth    Culture - Fungal, Other (collected 08 Jun 2023 18:00)  Source: .Other Right Kidney Urine  Preliminary Report (10 Bowen 2023 15:03):    Culture is being performed. Fungal cultures are held for 4 weeks.        RADIOLOGY & ADDITIONAL TESTS:  Results Reviewed:   Imaging Personally Reviewed:  Electrocardiogram Personally Reviewed:    COORDINATION OF CARE:  Care Discussed with Consultants/Other Providers [Y/N]:  Prior or Outpatient Records Reviewed [Y/N]:

## 2023-06-11 NOTE — PROGRESS NOTE ADULT - PROBLEM SELECTOR PLAN 2
CTAP noncontrast demonstrating moderate right-sided hydroureteronephrosis, with obstruction proximal to the UVJ in the region of a 3.6 x 2.9 x 4.6 cm bulbous soft tissue mass inseparable from the cervical remnant, mild left-sided hydroureteronephrosis to the level of the UVJ, and pelvic and RP lymphadenopathy concerning for metastatic disease. Pt with history of high-grade TAYLER of cervix requiring MILVIA/BSO in 2008.  - CT chest noncontrast ordered to r/o mets > no lung mets  - CTH noncontrast also ordered to r/o mets and given nausea, though of limited utility due to lack of contrast. Possibly defer CTH after initiation of HD to perform study with contrast.  >> no intracranial mass or bleeding  - s/p vaginal mass biopsy by gyn and pending path

## 2023-06-11 NOTE — PROGRESS NOTE ADULT - ATTENDING COMMENTS
Chart reviewed. Vitals and Labs noted.   Pt seen and examined at bedside. Plan formulated with the resident. Detail H&P as above.     Dx:   Acute renal failure, metabolic acidosis. improving.   GI symptoms likely renal related: neg GI PCR and neg c.diff.   Obstructive uropathy/ hydronephrosis due to obstructing soft tissue mass, with possible component of ATN/AIN given daily NSAID intake.   Renal and urology eval appreciated.  s/p Shiley and right nephrostomy placement given CT finding of soft tissue mass, causing b/l hydronephrosis.  GYN: s/p biopsy, path pending.  remote hx of malignancy 2008.  lymph notes noted on CT.   urine output from right nephrostomy is improving.   left kidney remain oligouric (no urine in nam)  Check Renal US. may need left nephrostomy tube as well.   hold ARB/HCTZ. Added Norvasc for BP control, BP improved with dialysis  DVT ppx    d/w the daughter.    - Dr. DE LEÓN Htet ([facility])  - [phone]

## 2023-06-11 NOTE — PROGRESS NOTE ADULT - PROBLEM SELECTOR PLAN 1
Serum Cr 16.77 and BUN 83 at presentation. Unknown baseline renal function, as no prior labs available. Concern for KINJAL on - CKD from obstructive uropathy, GI fluid losses (diarrhea), and poor PO intake.   - CTAP noncontrast obtained overnight and demonstrating moderate right-sided hydroureteronephrosis, with obstruction proximal to the UVJ in the region of a 3.6 x 2.9 x 4.6 cm bulbous soft tissue mass inseparable from the cervical remnant, mild left-sided hydroureteronephrosis to the level of the UVJ, and pelvic and RP lymphadenopathy concerning for metastatic disease  - Non-tunneled catheter placed 06/06, s/p 2nd HD.   - R nephrostomy tube placed 06/08  - S/p Baca catheter insertion in ED. C/w Baca catheter for now.  - US abdominal dopplers ordered to r/o renal artery stenosis >> negative  - UA, urine lytes, and urine protein/Cr ratio ordered, but pt currently with no urine output  - Check ALESIA, p-ANCA, c-ANCA, anti-GBM Ab, HBsAg, Hep C Ab, HIV, Parvovirus, C3, C4, SPEP, serum immunofixation, serum free light chains, and anti-PLA2R Ab  - dc sodium bicarb   - Monitor serum Cr and urine output with strict I&Os  - Avoid NSAIDs, ACEi/ARBs, contrast, and nephrotoxic agents   - Renally dose meds

## 2023-06-11 NOTE — PROGRESS NOTE ADULT - PROBLEM SELECTOR PLAN 5
BPs elevated to 170s-190s systolic on admission, likely in setting of severe renal failure. Pt on valsartan/HCTZ at home.  - Hold pt's home valsartan and HCTZ in setting of severe renal failure  - Monitor VS q8hrs  - can start hydralyzine PRN if sbp > 180

## 2023-06-11 NOTE — PROGRESS NOTE ADULT - PROBLEM SELECTOR PLAN 4
Hgb 10.3 initially, 10 on repeat. Likely multifactorial, including from possible GIB, anemia of chronic disease, and severe renal failure.  - Plan as above for GIB  - Check iron studies, folate, vitamin B12, retic count, LDH, and haptoglobin >> mixed picture of MARINE and anemia from chronic disease  - Pt reporting new ALLEN, suspect 2/2 anemia. Pt not grossly fluid overloaded on exam despite severe renal failure. Will check TTE to evaluate for structural heart failure. >> TTE grossly normal LVSF with EF 68%

## 2023-06-12 LAB
% ALBUMIN: 47.6 % — SIGNIFICANT CHANGE UP
% ALPHA 1: 5.3 % — SIGNIFICANT CHANGE UP
% ALPHA 2: 18.4 % — SIGNIFICANT CHANGE UP
% BETA: 16 % — SIGNIFICANT CHANGE UP
% GAMMA: 12.6 % — SIGNIFICANT CHANGE UP
ALBUMIN SERPL ELPH-MCNC: 3.14 G/DL — LOW (ref 3.3–4.4)
ALBUMIN SERPL ELPH-MCNC: 3.2 G/DL — LOW (ref 3.3–5)
ALBUMIN/GLOB SERPL ELPH: 0.9 RATIO — SIGNIFICANT CHANGE UP
ALP SERPL-CCNC: 52 U/L — SIGNIFICANT CHANGE UP (ref 40–120)
ALPHA1 GLOB SERPL ELPH-MCNC: 0.35 G/DL — HIGH (ref 0.1–0.3)
ALPHA2 GLOB SERPL ELPH-MCNC: 1.2 G/DL — HIGH (ref 0.6–1)
ALT FLD-CCNC: 8 U/L — SIGNIFICANT CHANGE UP (ref 4–33)
ANION GAP SERPL CALC-SCNC: 18 MMOL/L — HIGH (ref 7–14)
AST SERPL-CCNC: 11 U/L — SIGNIFICANT CHANGE UP (ref 4–32)
B-GLOBULIN SERPL ELPH-MCNC: 1.06 G/DL — SIGNIFICANT CHANGE UP (ref 0.6–1.1)
BILIRUB SERPL-MCNC: 0.2 MG/DL — SIGNIFICANT CHANGE UP (ref 0.2–1.2)
BUN SERPL-MCNC: 42 MG/DL — HIGH (ref 7–23)
CALCIUM SERPL-MCNC: 8.5 MG/DL — SIGNIFICANT CHANGE UP (ref 8.4–10.5)
CHLORIDE SERPL-SCNC: 96 MMOL/L — LOW (ref 98–107)
CO2 SERPL-SCNC: 24 MMOL/L — SIGNIFICANT CHANGE UP (ref 22–31)
CREAT SERPL-MCNC: 11.48 MG/DL — HIGH (ref 0.5–1.3)
EGFR: 3 ML/MIN/1.73M2 — LOW
GAMMA GLOBULIN: 0.83 G/DL — SIGNIFICANT CHANGE UP (ref 0.7–1.7)
GAS PNL BLDV: SIGNIFICANT CHANGE UP
GLUCOSE SERPL-MCNC: 83 MG/DL — SIGNIFICANT CHANGE UP (ref 70–99)
HCT VFR BLD CALC: 26.4 % — LOW (ref 34.5–45)
HGB BLD-MCNC: 8.7 G/DL — LOW (ref 11.5–15.5)
LACTATE BLDV-MCNC: 1 MMOL/L — SIGNIFICANT CHANGE UP (ref 0.5–2)
MAGNESIUM SERPL-MCNC: 2 MG/DL — SIGNIFICANT CHANGE UP (ref 1.6–2.6)
MCHC RBC-ENTMCNC: 30.9 PG — SIGNIFICANT CHANGE UP (ref 27–34)
MCHC RBC-ENTMCNC: 33 GM/DL — SIGNIFICANT CHANGE UP (ref 32–36)
MCV RBC AUTO: 93.6 FL — SIGNIFICANT CHANGE UP (ref 80–100)
NRBC # BLD: 0 /100 WBCS — SIGNIFICANT CHANGE UP (ref 0–0)
NRBC # FLD: 0 K/UL — SIGNIFICANT CHANGE UP (ref 0–0)
PHOSPHATE SERPL-MCNC: 6.6 MG/DL — HIGH (ref 2.5–4.5)
PLATELET # BLD AUTO: 250 K/UL — SIGNIFICANT CHANGE UP (ref 150–400)
POTASSIUM SERPL-MCNC: 3.8 MMOL/L — SIGNIFICANT CHANGE UP (ref 3.5–5.3)
POTASSIUM SERPL-SCNC: 3.8 MMOL/L — SIGNIFICANT CHANGE UP (ref 3.5–5.3)
PROT PATTERN SERPL ELPH-IMP: SIGNIFICANT CHANGE UP
PROT SERPL-MCNC: 5.9 G/DL — LOW (ref 6–8.3)
PROT SERPL-MCNC: 6.6 G/DL — SIGNIFICANT CHANGE UP
RBC # BLD: 2.82 M/UL — LOW (ref 3.8–5.2)
RBC # FLD: 11.9 % — SIGNIFICANT CHANGE UP (ref 10.3–14.5)
SODIUM SERPL-SCNC: 138 MMOL/L — SIGNIFICANT CHANGE UP (ref 135–145)
WBC # BLD: 11.62 K/UL — HIGH (ref 3.8–10.5)
WBC # FLD AUTO: 11.62 K/UL — HIGH (ref 3.8–10.5)

## 2023-06-12 PROCEDURE — 76770 US EXAM ABDO BACK WALL COMP: CPT | Mod: 26

## 2023-06-12 PROCEDURE — 99233 SBSQ HOSP IP/OBS HIGH 50: CPT | Mod: GC

## 2023-06-12 RX ADMIN — ONDANSETRON 4 MILLIGRAM(S): 8 TABLET, FILM COATED ORAL at 18:50

## 2023-06-12 RX ADMIN — Medication 100 MICROGRAM(S): at 05:52

## 2023-06-12 RX ADMIN — CHLORHEXIDINE GLUCONATE 1 APPLICATION(S): 213 SOLUTION TOPICAL at 13:48

## 2023-06-12 RX ADMIN — Medication 100 MILLIGRAM(S): at 21:29

## 2023-06-12 RX ADMIN — AMLODIPINE BESYLATE 5 MILLIGRAM(S): 2.5 TABLET ORAL at 05:53

## 2023-06-12 RX ADMIN — ATORVASTATIN CALCIUM 10 MILLIGRAM(S): 80 TABLET, FILM COATED ORAL at 21:29

## 2023-06-12 RX ADMIN — HEPARIN SODIUM 5000 UNIT(S): 5000 INJECTION INTRAVENOUS; SUBCUTANEOUS at 17:36

## 2023-06-12 RX ADMIN — Medication 100 MILLIGRAM(S): at 13:48

## 2023-06-12 RX ADMIN — PREGABALIN 1000 MICROGRAM(S): 225 CAPSULE ORAL at 13:53

## 2023-06-12 RX ADMIN — PANTOPRAZOLE SODIUM 40 MILLIGRAM(S): 20 TABLET, DELAYED RELEASE ORAL at 17:36

## 2023-06-12 NOTE — PROGRESS NOTE ADULT - SUBJECTIVE AND OBJECTIVE BOX
PROGRESS NOTE:   Authored by Bowen Noland, PGY-1     Patient is a 83y old  Female who presents with a chief complaint of Nausea, diarrhea, and renal failure (11 Jun 2023 06:42)      SUBJECTIVE / OVERNIGHT EVENTS:  NAEON. Pt denied fever, chill, chest pain, sob, n/v,    ADDITIONAL REVIEW OF SYSTEMS:    MEDICATIONS  (STANDING):  amLODIPine   Tablet 5 milliGRAM(s) Oral daily  atorvastatin 10 milliGRAM(s) Oral at bedtime  benzonatate 100 milliGRAM(s) Oral three times a day  chlorhexidine 2% Cloths 1 Application(s) Topical daily  cyanocobalamin 1000 MICROGram(s) Oral daily  heparin   Injectable 5000 Unit(s) SubCutaneous every 12 hours  levothyroxine 100 MICROGram(s) Oral daily  pantoprazole    Tablet 40 milliGRAM(s) Oral two times a day    MEDICATIONS  (PRN):  acetaminophen     Tablet .. 650 milliGRAM(s) Oral every 6 hours PRN Temp greater or equal to 38C (100.4F), Mild Pain (1 - 3), Moderate Pain (4 - 6)  acetaminophen     Tablet .. 975 milliGRAM(s) Oral every 6 hours PRN Severe Pain (7 - 10)  guaiFENesin Oral Liquid (Sugar-Free) 100 milliGRAM(s) Oral every 6 hours PRN Cough  ondansetron Injectable 4 milliGRAM(s) IV Push every 6 hours PRN Nausea and/or Vomiting  polyethylene glycol 3350 17 Gram(s) Oral daily PRN Constipation  senna 2 Tablet(s) Oral at bedtime PRN Constipation      CAPILLARY BLOOD GLUCOSE        I&O's Summary    11 Jun 2023 07:01  -  12 Jun 2023 07:00  --------------------------------------------------------  IN: 0 mL / OUT: 310 mL / NET: -310 mL        PHYSICAL EXAM:  Vital Signs Last 24 Hrs  T(C): 37.3 (12 Jun 2023 05:58), Max: 37.3 (11 Jun 2023 21:25)  T(F): 99.1 (12 Jun 2023 05:58), Max: 99.2 (11 Jun 2023 21:25)  HR: 86 (12 Jun 2023 05:58) (85 - 92)  BP: 145/60 (12 Jun 2023 05:58) (145/60 - 154/61)  BP(mean): --  RR: 18 (12 Jun 2023 05:58) (18 - 18)  SpO2: 95% (12 Jun 2023 05:58) (95% - 96%)    Parameters below as of 12 Jun 2023 05:58  Patient On (Oxygen Delivery Method): room air        General: Awake and alert.  No acute distress.  Head: Normocephalic, atraumatic.    Eyes: PERRL.  EOMI.  No scleral icterus.  No conjunctival pallor.  Mouth: Moist MM.  No oropharyngeal exudates.    Neck: Supple.  Full range of motion.  No JVD.  No LAD.  No thyromegaly.  Trachea midline.    Heart: RRR.  Normal S1 and S2.  No murmurs, rubs, or gallops.  No LE edema b/l.   Lungs: Nonlabored breathing.  Good inspiratory effort.  CTAB.  No wheezes, crackles, or rhonchi.    Abdomen: BS+, soft, nontender with no rebound or guarding, nondistended.  No hepatomegaly.   Genitourinary: Nephrostomy tube, recently emptied, with evidence of hematuria  Skin: Warm and dry.  No rashes.  Extremities: No cyanosis.  2+ peripheral pulses b/l.  Neuro: A&Ox3.  CNs grossly intact. No focal deficits.      LABS:                        8.9    10.27 )-----------( 223      ( 11 Jun 2023 04:44 )             27.1     06-11    136  |  98  |  36<H>  ----------------------------<  94  3.8   |  23  |  9.81<H>    Ca    8.4      11 Jun 2023 04:44  Phos  6.0     06-11  Mg     2.10     06-11                  RADIOLOGY & ADDITIONAL TESTS:  Results Reviewed:   Imaging Personally Reviewed:  Electrocardiogram Personally Reviewed:    COORDINATION OF CARE:  Care Discussed with Consultants/Other Providers [Y/N]:  Prior or Outpatient Records Reviewed [Y/N]:

## 2023-06-12 NOTE — PROGRESS NOTE ADULT - ASSESSMENT
82 yo woman with history of HTN, HLD, hypothyroidism, and high-grade squamous intraepithelial lesion s/p MILVIA/BSO (2008) presents with nausea and diarrhea, including with melena, for past ~1 week. Found to be in acute renal failure 2/2 an obstructing soft tissue mass inseparable from the cervical remnant and proximal to the UVJ, also with possible GIB. s/p Non-tunneled catheter for HD and R nephrostomy tube placement for R side obstructive uropathy from pelvic mass.

## 2023-06-12 NOTE — PROGRESS NOTE ADULT - ATTENDING COMMENTS
KINJAL  Dependence on renal dialysis    Plan for HD today for clearance  Cont to monitor labs and Is/Os from urostomy bags

## 2023-06-12 NOTE — PROGRESS NOTE ADULT - PROBLEM SELECTOR PLAN 1
Pt with advanced kidney failure and no KINJAL in the setting of GI fluid losses (diarrhea), decreased PO intake, meds (Valsartan-HCTZ) and chronic NSAIDs use. Exact duration and etiology of kidney failure remains unknown. Pt presented with melanotic stool/diarrhea past 1 week. SCr significantly elevated at 16.77 on ER labs. No prior labs available to review. No urine studies available. Baca placed in ER with no urine return noted. Pt with hx of chronic NSAIDs use. Pt likely with ?advanced CKD. CT scan with obstructive uropathy seen - moderate right-sided hydroureteronephrosis secondary to an obstructing soft tissue mass present. Pt underwent R nephrostomy tube placement by IR on 6/8/23, currently with improving output. Pt. seen receiving HD today. Will continue to monitor for renal recovery and assess for HD needs. Strict I/O. Avoid ACEi/ARBS/IV contrast/NSAIDs/Nephrotoxins. Dose meds as per egfr. Monitor labs.     If you have any questions, please feel free to contact me  Casandra Pleitez  Nephrology Fellow  [phone] / [employer] Teams(Preferred)  (After 5pm or on weekends please page the on-call fellow)

## 2023-06-12 NOTE — PROGRESS NOTE ADULT - SUBJECTIVE AND OBJECTIVE BOX
[facility] Division of Kidney Diseases & Hypertension  FOLLOW UP NOTE  [phone]--------------------------------------------------------------------------------  Chief Complaint: KINJAL    24 hour events/subjective: Pt. underwent R nephrostomy tube placement on 6/8. Pt. was seen and evaluated in the HD unit this morning receiving HD treatment. Vitals stable. She reports feeling well, endorses no complaints. Denies any headaches, fevers/chills, chest pain, SOB, and LE edema.      PAST HISTORY  --------------------------------------------------------------------------------  No significant changes to PMH, PSH, FHx, SHx, unless otherwise noted    ALLERGIES & MEDICATIONS  --------------------------------------------------------------------------------  Allergies    No Known Allergies    Intolerances      Standing Inpatient Medications  amLODIPine   Tablet 5 milliGRAM(s) Oral daily  atorvastatin 10 milliGRAM(s) Oral at bedtime  benzonatate 100 milliGRAM(s) Oral three times a day  chlorhexidine 2% Cloths 1 Application(s) Topical daily  cyanocobalamin 1000 MICROGram(s) Oral daily  heparin   Injectable 5000 Unit(s) SubCutaneous every 12 hours  levothyroxine 100 MICROGram(s) Oral daily  pantoprazole    Tablet 40 milliGRAM(s) Oral two times a day    PRN Inpatient Medications  acetaminophen     Tablet .. 650 milliGRAM(s) Oral every 6 hours PRN  acetaminophen     Tablet .. 975 milliGRAM(s) Oral every 6 hours PRN  guaiFENesin Oral Liquid (Sugar-Free) 100 milliGRAM(s) Oral every 6 hours PRN  ondansetron Injectable 4 milliGRAM(s) IV Push every 6 hours PRN  polyethylene glycol 3350 17 Gram(s) Oral daily PRN  senna 2 Tablet(s) Oral at bedtime PRN      REVIEW OF SYSTEMS  --------------------------------------------------------------------------------  See HPI    VITALS/PHYSICAL EXAM  --------------------------------------------------------------------------------  T(C): 37.1 (06-12-23 @ 10:00), Max: 37.6 (06-12-23 @ 06:40)  HR: 83 (06-12-23 @ 10:00) (83 - 92)  BP: 130/69 (06-12-23 @ 10:00) (130/69 - 172/76)  RR: 18 (06-12-23 @ 10:00) (18 - 18)  SpO2: 95% (06-12-23 @ 05:58) (95% - 96%)  Wt(kg): --      06-11-23 @ 07:01  -  06-12-23 @ 07:00  --------------------------------------------------------  IN: 0 mL / OUT: 460 mL / NET: -460 mL    06-12-23 @ 07:01  -  06-12-23 @ 11:22  --------------------------------------------------------  IN: 600 mL / OUT: 2100 mL / NET: -1500 mL      Physical Exam:  Gen: elderly female, resting, NAD  HEENT: Anicteric  Pulm: CTA B/L  CV: S1S2+  Abd: Soft, +BS    Ext: No LE edema B/L  Neuro: Awake  : Baca+ with no urine, R nephrostomy tube with clear urine  Skin: Warm and dry  IV access: RIJ non-tunneled HD catheter being used for HD    LABS/STUDIES  --------------------------------------------------------------------------------              8.7    11.62 >-----------<  250      [06-12-23 @ 06:40]              26.4     138  |  96  |  42  ----------------------------<  83      [06-12-23 @ 06:40]  3.8   |  24  |  11.48        Ca     8.5     [06-12-23 @ 06:40]      Mg     2.00     [06-12-23 @ 06:40]      Phos  6.6     [06-12-23 @ 06:40]    TPro  5.9  /  Alb  3.2  /  TBili  0.2  /  DBili  x   /  AST  11  /  ALT  8   /  AlkPhos  52  [06-12-23 @ 06:40]          [06-11-23 @ 04:44]    Creatinine Trend:  SCr 11.48 [06-12 @ 06:40]  SCr 9.81 [06-11 @ 04:44]  SCr 7.76 [06-10 @ 06:15]  SCr 10.85 [06-09 @ 07:00]  SCr 14.02 [06-08 @ 10:25]    Iron 32, TIBC 210, %sat 15      [06-07-23 @ 07:04]  Ferritin 151      [06-07-23 @ 07:04]    HBsAb <3.0      [06-08-23 @ 20:50]  HBsAg Nonreact      [06-08-23 @ 20:50]  HBcAb Nonreact      [06-08-23 @ 20:50]  HCV 0.07, Nonreact      [06-08-23 @ 20:50]  HIV Nonreact      [06-07-23 @ 07:04]    ALESIA: titer 1:80, pattern Homogeneous      [06-07-23 @ 07:04]  ANCA: cANCA Negative, pANCA Negative, atypical ANCA Indeterminate Method interference due to ALESIA Fluorescence      [06-07-23 @ 07:04]  PLA2R: ROMELIA <1.8, IFA --      [06-07-23 @ 07:04]

## 2023-06-12 NOTE — PROGRESS NOTE ADULT - ATTENDING COMMENTS
Chart reviewed. Vitals and Labs noted.   Pt seen and examined at bedside. Plan formulated with the resident. Detail H&P as above.     Dx:   Acute renal failure, metabolic acidosis. improving.   GI symptoms likely renal related: neg GI PCR and neg c.diff.   Obstructive uropathy/ hydronephrosis due to obstructing soft tissue mass, with possible component of ATN/AIN given daily NSAID intake.   Renal and urology eval appreciated.  s/p Shiley and right nephrostomy placement given CT finding of soft tissue mass, causing b/l hydronephrosis.  GYN: s/p biopsy, path pending.  remote hx of malignancy 2008.  lymph notes noted on CT.   urine output from right nephrostomy is improving.   left kidney remain oligouric (no urine in nam)  Check Renal US. may need left nephrostomy tube as well.   hold ARB/HCTZ. Added Norvasc for BP control, BP improved with dialysis  HD today.   DVT ppx    - Dr. DE LEÓN Htet ([facility])  - [phone]

## 2023-06-13 LAB
ANION GAP SERPL CALC-SCNC: 14 MMOL/L — SIGNIFICANT CHANGE UP (ref 7–14)
BLD GP AB SCN SERPL QL: NEGATIVE — SIGNIFICANT CHANGE UP
BUN SERPL-MCNC: 29 MG/DL — HIGH (ref 7–23)
CALCIUM SERPL-MCNC: 8.6 MG/DL — SIGNIFICANT CHANGE UP (ref 8.4–10.5)
CHLORIDE SERPL-SCNC: 99 MMOL/L — SIGNIFICANT CHANGE UP (ref 98–107)
CO2 SERPL-SCNC: 26 MMOL/L — SIGNIFICANT CHANGE UP (ref 22–31)
CREAT SERPL-MCNC: 8.24 MG/DL — HIGH (ref 0.5–1.3)
EGFR: 4 ML/MIN/1.73M2 — LOW
GLUCOSE SERPL-MCNC: 89 MG/DL — SIGNIFICANT CHANGE UP (ref 70–99)
MAGNESIUM SERPL-MCNC: 2.3 MG/DL — SIGNIFICANT CHANGE UP (ref 1.6–2.6)
PHOSPHATE SERPL-MCNC: 5.3 MG/DL — HIGH (ref 2.5–4.5)
POTASSIUM SERPL-MCNC: 4 MMOL/L — SIGNIFICANT CHANGE UP (ref 3.5–5.3)
POTASSIUM SERPL-SCNC: 4 MMOL/L — SIGNIFICANT CHANGE UP (ref 3.5–5.3)
RH IG SCN BLD-IMP: POSITIVE — SIGNIFICANT CHANGE UP
SODIUM SERPL-SCNC: 139 MMOL/L — SIGNIFICANT CHANGE UP (ref 135–145)

## 2023-06-13 PROCEDURE — 99233 SBSQ HOSP IP/OBS HIGH 50: CPT | Mod: GC

## 2023-06-13 RX ADMIN — Medication 100 MILLIGRAM(S): at 05:33

## 2023-06-13 RX ADMIN — CHLORHEXIDINE GLUCONATE 1 APPLICATION(S): 213 SOLUTION TOPICAL at 12:29

## 2023-06-13 RX ADMIN — PREGABALIN 1000 MICROGRAM(S): 225 CAPSULE ORAL at 12:28

## 2023-06-13 RX ADMIN — ATORVASTATIN CALCIUM 10 MILLIGRAM(S): 80 TABLET, FILM COATED ORAL at 21:54

## 2023-06-13 RX ADMIN — HEPARIN SODIUM 5000 UNIT(S): 5000 INJECTION INTRAVENOUS; SUBCUTANEOUS at 17:49

## 2023-06-13 RX ADMIN — PANTOPRAZOLE SODIUM 40 MILLIGRAM(S): 20 TABLET, DELAYED RELEASE ORAL at 17:49

## 2023-06-13 RX ADMIN — Medication 100 MICROGRAM(S): at 05:32

## 2023-06-13 RX ADMIN — Medication 100 MILLIGRAM(S): at 21:54

## 2023-06-13 RX ADMIN — PANTOPRAZOLE SODIUM 40 MILLIGRAM(S): 20 TABLET, DELAYED RELEASE ORAL at 05:33

## 2023-06-13 RX ADMIN — Medication 100 MILLIGRAM(S): at 14:02

## 2023-06-13 NOTE — DIETITIAN INITIAL EVALUATION ADULT - OTHER INFO
Per chart, pt is 83 year old female PMH HTN, HLD, hypothyroidism, high-grade squamous intraepithelial lesion s/p MILVIA/BSO (2008) presenting with nausea, diarrhea x1 week found to be in ARF secondary to obstructing soft tissue mass inseparable from the cervical remnant and proximal to the UVJ. S/p non-tunneled catheter for HD (6/6), s/p R nephrostomy tube placement (6/8) for R side obstructive uropathy from pelvic mass. S/p vaginal mass biopsy (6/7) with GYN, pending pathology. Nephrology following, last HD 6/12.     Pt's daughter at bedside throughout interaction. Pt confirms NKFA, denies difficulties chewing/swallowing. Pt lives at home with grandson, independent with ADLs PTA. Pt reports generally good appetite/PO intake separate from onset of nausea/diarrhea/melena x1 week PTA.     Pt reports improved appetite/PO intake since admission, consuming ~50% of each meal. Diarrhea has improved, stool studies negative. GI was consulted, no plan for EGD at this time. Nephrology monitoring for renal recovery. Labs notable for hyperphosphatemia.

## 2023-06-13 NOTE — PROGRESS NOTE ADULT - SUBJECTIVE AND OBJECTIVE BOX
PROGRESS NOTE:   Authored by Bowen Noland, PGY-1     Patient is a 83y old  Female who presents with a chief complaint of Nausea, diarrhea, and renal failure (12 Jun 2023 11:20)      SUBJECTIVE / OVERNIGHT EVENTS:  NAEON. Pt denied fever, chill, chest pain, sob, n/v.     ADDITIONAL REVIEW OF SYSTEMS:    MEDICATIONS  (STANDING):  amLODIPine   Tablet 5 milliGRAM(s) Oral daily  atorvastatin 10 milliGRAM(s) Oral at bedtime  benzonatate 100 milliGRAM(s) Oral three times a day  chlorhexidine 2% Cloths 1 Application(s) Topical daily  cyanocobalamin 1000 MICROGram(s) Oral daily  heparin   Injectable 5000 Unit(s) SubCutaneous every 12 hours  levothyroxine 100 MICROGram(s) Oral daily  pantoprazole    Tablet 40 milliGRAM(s) Oral two times a day    MEDICATIONS  (PRN):  acetaminophen     Tablet .. 650 milliGRAM(s) Oral every 6 hours PRN Temp greater or equal to 38C (100.4F), Mild Pain (1 - 3), Moderate Pain (4 - 6)  acetaminophen     Tablet .. 975 milliGRAM(s) Oral every 6 hours PRN Severe Pain (7 - 10)  guaiFENesin Oral Liquid (Sugar-Free) 100 milliGRAM(s) Oral every 6 hours PRN Cough  ondansetron Injectable 4 milliGRAM(s) IV Push every 6 hours PRN Nausea and/or Vomiting  polyethylene glycol 3350 17 Gram(s) Oral daily PRN Constipation  senna 2 Tablet(s) Oral at bedtime PRN Constipation      CAPILLARY BLOOD GLUCOSE        I&O's Summary    11 Jun 2023 07:01  -  12 Jun 2023 07:00  --------------------------------------------------------  IN: 0 mL / OUT: 460 mL / NET: -460 mL    12 Jun 2023 07:01  -  13 Jun 2023 06:47  --------------------------------------------------------  IN: 1250 mL / OUT: 3382 mL / NET: -2132 mL        PHYSICAL EXAM:  Vital Signs Last 24 Hrs  T(C): 36.8 (13 Jun 2023 05:30), Max: 37.4 (12 Jun 2023 13:54)  T(F): 98.2 (13 Jun 2023 05:30), Max: 99.3 (12 Jun 2023 13:54)  HR: 75 (13 Jun 2023 05:30) (75 - 83)  BP: 112/60 (13 Jun 2023 05:30) (112/60 - 159/65)  BP(mean): --  RR: 18 (13 Jun 2023 05:30) (18 - 18)  SpO2: 94% (13 Jun 2023 05:30) (94% - 100%)    Parameters below as of 13 Jun 2023 05:30  Patient On (Oxygen Delivery Method): room air        General: Awake and alert.  No acute distress.  Head: Normocephalic, atraumatic.    Eyes: PERRL.  EOMI.  No scleral icterus.  No conjunctival pallor.  Mouth: Moist MM.  No oropharyngeal exudates.    Neck: Supple.  Full range of motion.  No JVD.  No LAD.  No thyromegaly.  Trachea midline.    Heart: RRR.  Normal S1 and S2.  No murmurs, rubs, or gallops.  No LE edema b/l.   Lungs: Nonlabored breathing.  Good inspiratory effort.  CTAB.  No wheezes, crackles, or rhonchi.    Abdomen: BS+, soft, nontender with no rebound or guarding, nondistended.  No hepatomegaly.   Genitourinary: Nephrostomy tube, recently emptied, with evidence of hematuria  Skin: Warm and dry.  No rashes.  Extremities: No cyanosis.  2+ peripheral pulses b/l.  Neuro: A&Ox3.  CNs grossly intact. No focal deficits.      LABS:                        8.7    11.62 )-----------( 250      ( 12 Jun 2023 06:40 )             26.4     06-12    138  |  96<L>  |  42<H>  ----------------------------<  83  3.8   |  24  |  11.48<H>    Ca    8.5      12 Jun 2023 06:40  Phos  6.6     06-12  Mg     2.00     06-12    TPro  5.9<L>  /  Alb  3.2<L>  /  TBili  0.2  /  DBili  x   /  AST  11  /  ALT  8   /  AlkPhos  52  06-12                RADIOLOGY & ADDITIONAL TESTS:  Results Reviewed:   Imaging Personally Reviewed:  Electrocardiogram Personally Reviewed:    COORDINATION OF CARE:  Care Discussed with Consultants/Other Providers [Y/N]:  Prior or Outpatient Records Reviewed [Y/N]:

## 2023-06-13 NOTE — DIETITIAN INITIAL EVALUATION ADULT - ADD RECOMMEND
Recommend renal replacement diet.  Offered to provide nutrition supplement, pt declines.   Reviewed menu ordering system.   Pt made aware RDN remains available to provide nutrition education if and when plan for long term HD.   Obtain pre/post-HD weights.

## 2023-06-13 NOTE — PROGRESS NOTE ADULT - PROBLEM SELECTOR PLAN 1
Pt with advanced kidney failure and no KINJAL in the setting of GI fluid losses (diarrhea), decreased PO intake, meds (Valsartan-HCTZ) and chronic NSAIDs use. Exact duration and etiology of kidney failure remains unknown. Pt presented with melanotic stool/diarrhea past 1 week. SCr significantly elevated at 16.77 on ER labs. No prior labs available to review. No urine studies available. Baca placed in ER with no urine return noted. Pt with hx of chronic NSAIDs use. Pt likely with ?advanced CKD. CT scan with obstructive uropathy seen - moderate right-sided hydroureteronephrosis secondary to an obstructing soft tissue mass present. Pt underwent R nephrostomy tube placement by IR on 6/8/23, currently with improving output (~1.3L over the past 24 hours). Plan to hold off on HD and monitor for renal recovery. Will continue to evaluate daily for HD needs. Strict I/O. Avoid ACEi/ARBS/IV contrast/NSAIDs/Nephrotoxins. Dose meds as per egfr. Monitor labs.     If you have any questions, please feel free to contact me  Casandra Pleitez  Nephrology Fellow  [phone] / [employer] Teams(Preferred)  (After 5pm or on weekends please page the on-call fellow)

## 2023-06-13 NOTE — DIETITIAN INITIAL EVALUATION ADULT - PERTINENT MEDS FT
atorvastatin   cyanocobalamin   levothyroxine   pantoprazole Tablet  ondansetron IV PRN  polyethylene glycol PRN  senna PRN

## 2023-06-13 NOTE — PROGRESS NOTE ADULT - SUBJECTIVE AND OBJECTIVE BOX
[facility] Division of Kidney Diseases & Hypertension  FOLLOW UP NOTE  [phone]--------------------------------------------------------------------------------  Chief Complaint: KINJAL    24 hour events/subjective: Pt. underwent R nephrostomy tube placement on 6/8. Last HD treatment was on 6/12. Pt. was seen and evaluated at bedside this morning. She reports feeling well, endorses no complaints. Denies any headaches, fevers/chills, chest pain, SOB, and LE edema.      PAST HISTORY  --------------------------------------------------------------------------------  No significant changes to PMH, PSH, FHx, SHx, unless otherwise noted    ALLERGIES & MEDICATIONS  --------------------------------------------------------------------------------  Allergies    No Known Allergies    Intolerances      Standing Inpatient Medications  amLODIPine   Tablet 5 milliGRAM(s) Oral daily  atorvastatin 10 milliGRAM(s) Oral at bedtime  benzonatate 100 milliGRAM(s) Oral three times a day  chlorhexidine 2% Cloths 1 Application(s) Topical daily  cyanocobalamin 1000 MICROGram(s) Oral daily  heparin   Injectable 5000 Unit(s) SubCutaneous every 12 hours  levothyroxine 100 MICROGram(s) Oral daily  pantoprazole    Tablet 40 milliGRAM(s) Oral two times a day    PRN Inpatient Medications  acetaminophen     Tablet .. 650 milliGRAM(s) Oral every 6 hours PRN  acetaminophen     Tablet .. 975 milliGRAM(s) Oral every 6 hours PRN  guaiFENesin Oral Liquid (Sugar-Free) 100 milliGRAM(s) Oral every 6 hours PRN  ondansetron Injectable 4 milliGRAM(s) IV Push every 6 hours PRN  polyethylene glycol 3350 17 Gram(s) Oral daily PRN  senna 2 Tablet(s) Oral at bedtime PRN      REVIEW OF SYSTEMS  --------------------------------------------------------------------------------  See HPI    VITALS/PHYSICAL EXAM  --------------------------------------------------------------------------------  T(C): 36.8 (06-13-23 @ 05:30), Max: 37.4 (06-12-23 @ 13:54)  HR: 75 (06-13-23 @ 05:30) (75 - 82)  BP: 112/60 (06-13-23 @ 05:30) (112/60 - 159/65)  RR: 18 (06-13-23 @ 05:30) (18 - 18)  SpO2: 94% (06-13-23 @ 05:30) (94% - 100%)  Wt(kg): --      06-12-23 @ 07:01  -  06-13-23 @ 07:00  --------------------------------------------------------  IN: 1250 mL / OUT: 3382 mL / NET: -2132 mL    06-13-23 @ 07:01  -  06-13-23 @ 12:10  --------------------------------------------------------  IN: 0 mL / OUT: 0 mL / NET: 0 mL      Physical Exam:  Gen: elderly female, resting, NAD  HEENT: Anicteric  Pulm: CTA B/L  CV: S1S2+  Abd: Soft, +BS    Ext: No LE edema B/L  Neuro: Awake and alert  : Baca+ with clear urine, R nephrostomy tube with clear urine  Skin: Warm and dry  IV access: RIJ non-tunneled HD catheter    LABS/STUDIES  --------------------------------------------------------------------------------              8.7    11.62 >-----------<  250      [06-12-23 @ 06:40]              26.4     139  |  99  |  29  ----------------------------<  89      [06-13-23 @ 05:11]  4.0   |  26  |  8.24        Ca     8.6     [06-13-23 @ 05:11]      Mg     2.30     [06-13-23 @ 05:11]      Phos  5.3     [06-13-23 @ 05:11]    TPro  5.9  /  Alb  3.2  /  TBili  0.2  /  DBili  x   /  AST  11  /  ALT  8   /  AlkPhos  52  [06-12-23 @ 06:40]    Creatinine Trend:  SCr 8.24 [06-13 @ 05:11]  SCr 11.48 [06-12 @ 06:40]  SCr 9.81 [06-11 @ 04:44]  SCr 7.76 [06-10 @ 06:15]  SCr 10.85 [06-09 @ 07:00]    Iron 32, TIBC 210, %sat 15      [06-07-23 @ 07:04]  Ferritin 151      [06-07-23 @ 07:04]    HBsAb <3.0      [06-08-23 @ 20:50]  HBsAg Nonreact      [06-08-23 @ 20:50]  HBcAb Nonreact      [06-08-23 @ 20:50]  HCV 0.07, Nonreact      [06-08-23 @ 20:50]  HIV Nonreact      [06-07-23 @ 07:04]    ALESIA: titer 1:80, pattern Homogeneous      [06-07-23 @ 07:04]  ANCA: cANCA Negative, pANCA Negative, atypical ANCA Indeterminate Method interference due to ALESIA Fluorescence      [06-07-23 @ 07:04]  PLA2R: ROMELIA <1.8, IFA --      [06-07-23 @ 07:04]  SPEP Interpretation: Hypoalbuminemia with increased Alpha-1 and Alpha-2 fractions consistent  with acute phase reaction.  YOVANA Logan M.D.      [06-07-23 @ 07:04]

## 2023-06-13 NOTE — PROGRESS NOTE ADULT - ATTENDING COMMENTS
Chart reviewed. Vitals and Labs noted.   Pt seen and examined at bedside. Plan formulated with the resident. Detail H&P as above.     Dx:   Acute renal failure, metabolic acidosis. improving.   Obstructive uropathy/ hydronephrosis due to obstructing soft tissue mass, with possible component of ATN/AIN given daily NSAID intake.   Renal and urology eval appreciated.  (GI symptoms likely renal related: neg GI PCR and neg c.diff)  s/p Shiley and right nephrostomy placement given CT finding of soft tissue mass, causing b/l hydronephrosis.  GYN: s/p biopsy, path pending.  remote hx of malignancy 2008.  lymph notes noted on CT.   urine output from right nephrostomy is improving. left kidney remain oligouric (no urine in nam)  repeat renal US stable.    hold ARB/HCTZ. Added Norvasc for BP control, BP improved with dialysis  Renal planning to monitor her off dialysis.   DVT ppx    Fox Kay will be covering for the pt starting 6/14/23. He can be reached at  if needed.     - Dr. SARTHAK Boone ([facility])  - [phone]

## 2023-06-13 NOTE — PROGRESS NOTE ADULT - ATTENDING COMMENTS
KINJAL  Dependence on renal dialysis    Pt is making more urine. Will hold HD for a few days and monitor labs and Is/Os and for signs of recovery.

## 2023-06-14 ENCOUNTER — NON-APPOINTMENT (OUTPATIENT)
Age: 83
End: 2023-06-14

## 2023-06-14 LAB
ANION GAP SERPL CALC-SCNC: 16 MMOL/L — HIGH (ref 7–14)
BUN SERPL-MCNC: 38 MG/DL — HIGH (ref 7–23)
CALCIUM SERPL-MCNC: 8.6 MG/DL — SIGNIFICANT CHANGE UP (ref 8.4–10.5)
CHLORIDE SERPL-SCNC: 96 MMOL/L — LOW (ref 98–107)
CO2 SERPL-SCNC: 24 MMOL/L — SIGNIFICANT CHANGE UP (ref 22–31)
CREAT SERPL-MCNC: 10.29 MG/DL — HIGH (ref 0.5–1.3)
EGFR: 3 ML/MIN/1.73M2 — LOW
GLUCOSE SERPL-MCNC: 91 MG/DL — SIGNIFICANT CHANGE UP (ref 70–99)
HCT VFR BLD CALC: 27.3 % — LOW (ref 34.5–45)
HGB BLD-MCNC: 8.8 G/DL — LOW (ref 11.5–15.5)
INTERPRETATION SERPL IFE-IMP: SIGNIFICANT CHANGE UP
MAGNESIUM SERPL-MCNC: 2 MG/DL — SIGNIFICANT CHANGE UP (ref 1.6–2.6)
MCHC RBC-ENTMCNC: 30.3 PG — SIGNIFICANT CHANGE UP (ref 27–34)
MCHC RBC-ENTMCNC: 32.2 GM/DL — SIGNIFICANT CHANGE UP (ref 32–36)
MCV RBC AUTO: 94.1 FL — SIGNIFICANT CHANGE UP (ref 80–100)
NRBC # BLD: 0 /100 WBCS — SIGNIFICANT CHANGE UP (ref 0–0)
NRBC # FLD: 0 K/UL — SIGNIFICANT CHANGE UP (ref 0–0)
PHOSPHATE SERPL-MCNC: 5.8 MG/DL — HIGH (ref 2.5–4.5)
PLATELET # BLD AUTO: 267 K/UL — SIGNIFICANT CHANGE UP (ref 150–400)
POTASSIUM SERPL-MCNC: 4 MMOL/L — SIGNIFICANT CHANGE UP (ref 3.5–5.3)
POTASSIUM SERPL-SCNC: 4 MMOL/L — SIGNIFICANT CHANGE UP (ref 3.5–5.3)
RBC # BLD: 2.9 M/UL — LOW (ref 3.8–5.2)
RBC # FLD: 12 % — SIGNIFICANT CHANGE UP (ref 10.3–14.5)
SODIUM SERPL-SCNC: 136 MMOL/L — SIGNIFICANT CHANGE UP (ref 135–145)
SURGICAL PATHOLOGY STUDY: SIGNIFICANT CHANGE UP
WBC # BLD: 10.71 K/UL — HIGH (ref 3.8–10.5)
WBC # FLD AUTO: 10.71 K/UL — HIGH (ref 3.8–10.5)

## 2023-06-14 PROCEDURE — 99233 SBSQ HOSP IP/OBS HIGH 50: CPT | Mod: GC

## 2023-06-14 RX ADMIN — PANTOPRAZOLE SODIUM 40 MILLIGRAM(S): 20 TABLET, DELAYED RELEASE ORAL at 18:06

## 2023-06-14 RX ADMIN — Medication 100 MILLIGRAM(S): at 18:27

## 2023-06-14 RX ADMIN — HEPARIN SODIUM 5000 UNIT(S): 5000 INJECTION INTRAVENOUS; SUBCUTANEOUS at 18:07

## 2023-06-14 RX ADMIN — Medication 100 MILLIGRAM(S): at 13:47

## 2023-06-14 RX ADMIN — Medication 100 MILLIGRAM(S): at 22:09

## 2023-06-14 RX ADMIN — CHLORHEXIDINE GLUCONATE 1 APPLICATION(S): 213 SOLUTION TOPICAL at 12:00

## 2023-06-14 RX ADMIN — HEPARIN SODIUM 5000 UNIT(S): 5000 INJECTION INTRAVENOUS; SUBCUTANEOUS at 06:04

## 2023-06-14 RX ADMIN — ATORVASTATIN CALCIUM 10 MILLIGRAM(S): 80 TABLET, FILM COATED ORAL at 22:09

## 2023-06-14 RX ADMIN — AMLODIPINE BESYLATE 5 MILLIGRAM(S): 2.5 TABLET ORAL at 06:03

## 2023-06-14 RX ADMIN — PREGABALIN 1000 MICROGRAM(S): 225 CAPSULE ORAL at 12:00

## 2023-06-14 RX ADMIN — Medication 100 MILLIGRAM(S): at 06:03

## 2023-06-14 RX ADMIN — PANTOPRAZOLE SODIUM 40 MILLIGRAM(S): 20 TABLET, DELAYED RELEASE ORAL at 06:03

## 2023-06-14 RX ADMIN — Medication 100 MICROGRAM(S): at 06:04

## 2023-06-14 NOTE — PROGRESS NOTE ADULT - ATTENDING COMMENTS
KINJAL  Obstructive uropathy    Cr el today, making urine however  Will monitor labs and Is/Os  Holding HD today, will consider doing it tomorrow should labs worsen

## 2023-06-14 NOTE — PROGRESS NOTE ADULT - ATTENDING COMMENTS
Chart reviewed. Vitals and Labs noted.   Pt seen and examined at bedside. Plan formulated with the resident. Agree with above progress note.    Appreciate PGY-1!  Spoke w/daughter @ bedside  Pt just came out of bathroom, she made "a drop" of urine so far this morning    Dx:   Acute renal failure, metabolic acidosis. improving.   Obstructive uropathy/ hydronephrosis due to obstructing soft tissue mass, with possible component of ATN/AIN given daily NSAID intake.   Renal and urology eval appreciated.  (GI symptoms likely renal related: neg GI PCR and neg c.diff)  s/p Shiley and right nephrostomy placement given CT finding of soft tissue mass, causing b/l hydronephrosis.  GYN: s/p biopsy, path pending.  remote hx of malignancy 2008.  lymph notes noted on CT.   urine output from right nephrostomy is improving. left kidney remain oligouric (no urine in nam)  repeat renal US stable.    hold ARB/HCTZ. Cont Norvasc for BP control, BP improved with dialysis  Renal planning to monitor her off dialysis (last HD was 6/12/23)  DVT ppx    - Dr. Fox Pena ([facility])  - [phone] .

## 2023-06-14 NOTE — PROGRESS NOTE ADULT - SUBJECTIVE AND OBJECTIVE BOX
[facility] Division of Kidney Diseases & Hypertension  FOLLOW UP NOTE  [phone]--------------------------------------------------------------------------------    Chief Complaint: KINJAL    24 hour events/subjective: Pt. underwent R nephrostomy tube placement on 6/8. Last HD treatment was on 6/12. Pt. was seen and evaluated at bedside this morning. She reports feeling well, endorses no complaints. Denies any headaches, fevers/chills, chest pain, SOB, and LE edema.      PAST HISTORY  --------------------------------------------------------------------------------  No significant changes to PMH, PSH, FHx, SHx, unless otherwise noted    ALLERGIES & MEDICATIONS  --------------------------------------------------------------------------------  Allergies    No Known Allergies    Intolerances      Standing Inpatient Medications  amLODIPine   Tablet 5 milliGRAM(s) Oral daily  atorvastatin 10 milliGRAM(s) Oral at bedtime  benzonatate 100 milliGRAM(s) Oral three times a day  chlorhexidine 2% Cloths 1 Application(s) Topical daily  cyanocobalamin 1000 MICROGram(s) Oral daily  heparin   Injectable 5000 Unit(s) SubCutaneous every 12 hours  levothyroxine 100 MICROGram(s) Oral daily  pantoprazole    Tablet 40 milliGRAM(s) Oral two times a day    PRN Inpatient Medications  acetaminophen     Tablet .. 650 milliGRAM(s) Oral every 6 hours PRN  acetaminophen     Tablet .. 975 milliGRAM(s) Oral every 6 hours PRN  guaiFENesin Oral Liquid (Sugar-Free) 100 milliGRAM(s) Oral every 6 hours PRN  ondansetron Injectable 4 milliGRAM(s) IV Push every 6 hours PRN  polyethylene glycol 3350 17 Gram(s) Oral daily PRN  senna 2 Tablet(s) Oral at bedtime PRN      REVIEW OF SYSTEMS  --------------------------------------------------------------------------------  See HPI    VITALS/PHYSICAL EXAM  --------------------------------------------------------------------------------  T(C): 37.1 (06-14-23 @ 06:06), Max: 37.2 (06-13-23 @ 11:53)  HR: 82 (06-14-23 @ 06:06) (76 - 87)  BP: 156/68 (06-14-23 @ 06:06) (144/64 - 156/68)  RR: 19 (06-14-23 @ 06:06) (17 - 19)  SpO2: 94% (06-14-23 @ 06:06) (94% - 96%)  Wt(kg): --      06-13-23 @ 07:01  -  06-14-23 @ 07:00  --------------------------------------------------------  IN: 480 mL / OUT: 550 mL / NET: -70 mL      Physical Exam:  Gen: elderly female, resting, NAD  HEENT: Anicteric  Pulm: CTA B/L  CV: S1S2+  Abd: Soft, +BS    Ext: No LE edema B/L  Neuro: Awake and alert  : +R nephrostomy tube with clear urine, nam removed  Skin: Warm and dry  IV access: RIJ non-tunneled HD catheter      LABS/STUDIES  --------------------------------------------------------------------------------              8.8    10.71 >-----------<  267      [06-14-23 @ 06:40]              27.3     136  |  96  |  38  ----------------------------<  91      [06-14-23 @ 06:40]  4.0   |  24  |  10.29        Ca     8.6     [06-14-23 @ 06:40]      Mg     2.00     [06-14-23 @ 06:40]      Phos  5.8     [06-14-23 @ 06:40]    Creatinine Trend:  SCr 10.29 [06-14 @ 06:40]  SCr 8.24 [06-13 @ 05:11]  SCr 11.48 [06-12 @ 06:40]  SCr 9.81 [06-11 @ 04:44]  SCr 7.76 [06-10 @ 06:15]    HBsAb <3.0      [06-08-23 @ 20:50]  HBsAg Nonreact      [06-08-23 @ 20:50]  HBcAb Nonreact      [06-08-23 @ 20:50]  HCV 0.07, Nonreact      [06-08-23 @ 20:50]

## 2023-06-14 NOTE — PROGRESS NOTE ADULT - SUBJECTIVE AND OBJECTIVE BOX
PROGRESS NOTE:   Authored by Bowen Noland, PGY-1     Patient is a 83y old  Female who presents with a chief complaint of Acute renal failure     (13 Jun 2023 12:42)      SUBJECTIVE / OVERNIGHT EVENTS:  NAEON. Pt denied fever, chill, chest pain, sob, n/v.     ADDITIONAL REVIEW OF SYSTEMS:    MEDICATIONS  (STANDING):  amLODIPine   Tablet 5 milliGRAM(s) Oral daily  atorvastatin 10 milliGRAM(s) Oral at bedtime  benzonatate 100 milliGRAM(s) Oral three times a day  chlorhexidine 2% Cloths 1 Application(s) Topical daily  cyanocobalamin 1000 MICROGram(s) Oral daily  heparin   Injectable 5000 Unit(s) SubCutaneous every 12 hours  levothyroxine 100 MICROGram(s) Oral daily  pantoprazole    Tablet 40 milliGRAM(s) Oral two times a day    MEDICATIONS  (PRN):  acetaminophen     Tablet .. 650 milliGRAM(s) Oral every 6 hours PRN Temp greater or equal to 38C (100.4F), Mild Pain (1 - 3), Moderate Pain (4 - 6)  acetaminophen     Tablet .. 975 milliGRAM(s) Oral every 6 hours PRN Severe Pain (7 - 10)  guaiFENesin Oral Liquid (Sugar-Free) 100 milliGRAM(s) Oral every 6 hours PRN Cough  ondansetron Injectable 4 milliGRAM(s) IV Push every 6 hours PRN Nausea and/or Vomiting  polyethylene glycol 3350 17 Gram(s) Oral daily PRN Constipation  senna 2 Tablet(s) Oral at bedtime PRN Constipation      CAPILLARY BLOOD GLUCOSE        I&O's Summary    12 Jun 2023 07:01  -  13 Jun 2023 07:00  --------------------------------------------------------  IN: 1250 mL / OUT: 3382 mL / NET: -2132 mL    13 Jun 2023 07:01  -  14 Jun 2023 06:50  --------------------------------------------------------  IN: 240 mL / OUT: 250 mL / NET: -10 mL        PHYSICAL EXAM:  Vital Signs Last 24 Hrs  T(C): 37.1 (14 Jun 2023 06:06), Max: 37.2 (13 Jun 2023 11:53)  T(F): 98.7 (14 Jun 2023 06:06), Max: 98.9 (13 Jun 2023 11:53)  HR: 82 (14 Jun 2023 06:06) (76 - 87)  BP: 156/68 (14 Jun 2023 06:06) (144/64 - 156/68)  BP(mean): --  RR: 19 (14 Jun 2023 06:06) (17 - 19)  SpO2: 94% (14 Jun 2023 06:06) (94% - 96%)    Parameters below as of 14 Jun 2023 06:06  Patient On (Oxygen Delivery Method): room air        General: Awake and alert.  No acute distress.  Head: Normocephalic, atraumatic.    Eyes: PERRL.  EOMI.  No scleral icterus.  No conjunctival pallor.  Mouth: Moist MM.  No oropharyngeal exudates.    Neck: Supple.  Full range of motion.  No JVD.  No LAD.  No thyromegaly.  Trachea midline.    Heart: RRR.  Normal S1 and S2.  No murmurs, rubs, or gallops.  No LE edema b/l.   Lungs: Nonlabored breathing.  Good inspiratory effort.  CTAB.  No wheezes, crackles, or rhonchi.    Abdomen: BS+, soft, nontender with no rebound or guarding, nondistended.  No hepatomegaly.   Genitourinary: Nephrostomy tube, recently emptied, with evidence of hematuria  Skin: Warm and dry.  No rashes.  Extremities: No cyanosis.  2+ peripheral pulses b/l.  Neuro: A&Ox3.  CNs grossly intact. No focal deficits.      LABS:    06-13    139  |  99  |  29<H>  ----------------------------<  89  4.0   |  26  |  8.24<H>    Ca    8.6      13 Jun 2023 05:11  Phos  5.3     06-13  Mg     2.30     06-13                  RADIOLOGY & ADDITIONAL TESTS:  Results Reviewed:   Imaging Personally Reviewed:  Electrocardiogram Personally Reviewed:    COORDINATION OF CARE:  Care Discussed with Consultants/Other Providers [Y/N]:  Prior or Outpatient Records Reviewed [Y/N]:

## 2023-06-14 NOTE — PROGRESS NOTE ADULT - PROBLEM SELECTOR PLAN 1
Pt with advanced kidney failure and no KINJAL in the setting of GI fluid losses (diarrhea), decreased PO intake, meds (Valsartan-HCTZ) and chronic NSAIDs use. Exact duration and etiology of kidney failure remains unknown. Pt presented with melanotic stool/diarrhea past 1 week. SCr significantly elevated at 16.77 on ER labs. No prior labs available to review. No urine studies available. Baca placed in ER with no urine return noted. Pt with hx of chronic NSAIDs use. Pt likely with ?advanced CKD. CT scan with obstructive uropathy seen - moderate right-sided hydroureteronephrosis secondary to an obstructing soft tissue mass present. Pt underwent R nephrostomy tube placement by IR on 6/8/23. Last HD treatment was on 6/12. Documented urine output is 550 cc (from R PCN) over the past 24 hours. Plan to hold off on HD and monitor for renal recovery. Will continue to evaluate daily for HD needs. Strict I/O. Avoid ACEi/ARBS/IV contrast/NSAIDs/Nephrotoxins. Dose meds as per egfr. Monitor labs.     If you have any questions, please feel free to contact me  Casandra Pleitez  Nephrology Fellow  [phone] / [employer] Teams(Preferred)  (After 5pm or on weekends please page the on-call fellow)

## 2023-06-15 LAB
ANION GAP SERPL CALC-SCNC: 18 MMOL/L — HIGH (ref 7–14)
BUN SERPL-MCNC: 43 MG/DL — HIGH (ref 7–23)
CALCIUM SERPL-MCNC: 8.5 MG/DL — SIGNIFICANT CHANGE UP (ref 8.4–10.5)
CHLORIDE SERPL-SCNC: 93 MMOL/L — LOW (ref 98–107)
CO2 SERPL-SCNC: 21 MMOL/L — LOW (ref 22–31)
CREAT SERPL-MCNC: 11.53 MG/DL — HIGH (ref 0.5–1.3)
EGFR: 3 ML/MIN/1.73M2 — LOW
GLUCOSE SERPL-MCNC: 89 MG/DL — SIGNIFICANT CHANGE UP (ref 70–99)
HCT VFR BLD CALC: 26.6 % — LOW (ref 34.5–45)
HGB BLD-MCNC: 8.7 G/DL — LOW (ref 11.5–15.5)
MAGNESIUM SERPL-MCNC: 2 MG/DL — SIGNIFICANT CHANGE UP (ref 1.6–2.6)
MCHC RBC-ENTMCNC: 30.4 PG — SIGNIFICANT CHANGE UP (ref 27–34)
MCHC RBC-ENTMCNC: 32.7 GM/DL — SIGNIFICANT CHANGE UP (ref 32–36)
MCV RBC AUTO: 93 FL — SIGNIFICANT CHANGE UP (ref 80–100)
NRBC # BLD: 0 /100 WBCS — SIGNIFICANT CHANGE UP (ref 0–0)
NRBC # FLD: 0 K/UL — SIGNIFICANT CHANGE UP (ref 0–0)
PHOSPHATE SERPL-MCNC: 5.9 MG/DL — HIGH (ref 2.5–4.5)
PLATELET # BLD AUTO: 276 K/UL — SIGNIFICANT CHANGE UP (ref 150–400)
POTASSIUM SERPL-MCNC: 3.5 MMOL/L — SIGNIFICANT CHANGE UP (ref 3.5–5.3)
POTASSIUM SERPL-SCNC: 3.5 MMOL/L — SIGNIFICANT CHANGE UP (ref 3.5–5.3)
RBC # BLD: 2.86 M/UL — LOW (ref 3.8–5.2)
RBC # FLD: 11.9 % — SIGNIFICANT CHANGE UP (ref 10.3–14.5)
SODIUM SERPL-SCNC: 132 MMOL/L — LOW (ref 135–145)
WBC # BLD: 9.78 K/UL — SIGNIFICANT CHANGE UP (ref 3.8–10.5)
WBC # FLD AUTO: 9.78 K/UL — SIGNIFICANT CHANGE UP (ref 3.8–10.5)

## 2023-06-15 PROCEDURE — 99233 SBSQ HOSP IP/OBS HIGH 50: CPT | Mod: GC

## 2023-06-15 PROCEDURE — 76770 US EXAM ABDO BACK WALL COMP: CPT | Mod: 26

## 2023-06-15 RX ADMIN — Medication 100 MILLIGRAM(S): at 16:04

## 2023-06-15 RX ADMIN — Medication 100 MILLIGRAM(S): at 09:38

## 2023-06-15 RX ADMIN — ATORVASTATIN CALCIUM 10 MILLIGRAM(S): 80 TABLET, FILM COATED ORAL at 21:48

## 2023-06-15 RX ADMIN — HEPARIN SODIUM 5000 UNIT(S): 5000 INJECTION INTRAVENOUS; SUBCUTANEOUS at 18:13

## 2023-06-15 RX ADMIN — AMLODIPINE BESYLATE 5 MILLIGRAM(S): 2.5 TABLET ORAL at 06:24

## 2023-06-15 RX ADMIN — CHLORHEXIDINE GLUCONATE 1 APPLICATION(S): 213 SOLUTION TOPICAL at 14:50

## 2023-06-15 RX ADMIN — HEPARIN SODIUM 5000 UNIT(S): 5000 INJECTION INTRAVENOUS; SUBCUTANEOUS at 06:25

## 2023-06-15 RX ADMIN — PREGABALIN 1000 MICROGRAM(S): 225 CAPSULE ORAL at 14:49

## 2023-06-15 RX ADMIN — PANTOPRAZOLE SODIUM 40 MILLIGRAM(S): 20 TABLET, DELAYED RELEASE ORAL at 18:11

## 2023-06-15 RX ADMIN — Medication 100 MICROGRAM(S): at 06:24

## 2023-06-15 RX ADMIN — PANTOPRAZOLE SODIUM 40 MILLIGRAM(S): 20 TABLET, DELAYED RELEASE ORAL at 06:24

## 2023-06-15 NOTE — PROGRESS NOTE ADULT - SUBJECTIVE AND OBJECTIVE BOX
PROGRESS NOTE:   Authored by Bowen Noland, PGY-1     Patient is a 83y old  Female who presents with a chief complaint of Nausea, diarrhea, and renal failure (14 Jun 2023 11:10)      SUBJECTIVE / OVERNIGHT EVENTS:  NAEON. Pt denied fever, chill, chest pain, sob, n/v.       ADDITIONAL REVIEW OF SYSTEMS:    MEDICATIONS  (STANDING):  amLODIPine   Tablet 5 milliGRAM(s) Oral daily  atorvastatin 10 milliGRAM(s) Oral at bedtime  chlorhexidine 2% Cloths 1 Application(s) Topical daily  cyanocobalamin 1000 MICROGram(s) Oral daily  heparin   Injectable 5000 Unit(s) SubCutaneous every 12 hours  levothyroxine 100 MICROGram(s) Oral daily  pantoprazole    Tablet 40 milliGRAM(s) Oral two times a day    MEDICATIONS  (PRN):  acetaminophen     Tablet .. 650 milliGRAM(s) Oral every 6 hours PRN Temp greater or equal to 38C (100.4F), Mild Pain (1 - 3), Moderate Pain (4 - 6)  acetaminophen     Tablet .. 975 milliGRAM(s) Oral every 6 hours PRN Severe Pain (7 - 10)  guaiFENesin Oral Liquid (Sugar-Free) 100 milliGRAM(s) Oral every 6 hours PRN Cough  ondansetron Injectable 4 milliGRAM(s) IV Push every 6 hours PRN Nausea and/or Vomiting  polyethylene glycol 3350 17 Gram(s) Oral daily PRN Constipation  senna 2 Tablet(s) Oral at bedtime PRN Constipation      CAPILLARY BLOOD GLUCOSE        I&O's Summary    13 Jun 2023 07:01  -  14 Jun 2023 07:00  --------------------------------------------------------  IN: 480 mL / OUT: 550 mL / NET: -70 mL    14 Jun 2023 07:01  -  15 Jun 2023 06:46  --------------------------------------------------------  IN: 1040 mL / OUT: 200 mL / NET: 840 mL        PHYSICAL EXAM:  Vital Signs Last 24 Hrs  T(C): 36.7 (14 Jun 2023 21:35), Max: 37.2 (14 Jun 2023 13:30)  T(F): 98 (14 Jun 2023 21:35), Max: 98.9 (14 Jun 2023 13:30)  HR: 87 (14 Jun 2023 21:35) (79 - 87)  BP: 152/62 (14 Jun 2023 21:35) (152/62 - 164/73)  BP(mean): --  RR: 18 (14 Jun 2023 21:35) (18 - 18)  SpO2: 95% (14 Jun 2023 21:35) (95% - 100%)    Parameters below as of 14 Jun 2023 21:35  Patient On (Oxygen Delivery Method): room air      General: Awake and alert.  No acute distress.  Head: Normocephalic, atraumatic.    Eyes: PERRL.  EOMI.  No scleral icterus.  No conjunctival pallor.  Mouth: Moist MM.  No oropharyngeal exudates.    Neck: Supple.  Full range of motion.  No JVD.  No LAD.  No thyromegaly.  Trachea midline.    Heart: RRR.  Normal S1 and S2.  No murmurs, rubs, or gallops.  No LE edema b/l.   Lungs: Nonlabored breathing.  Good inspiratory effort.  CTAB.  No wheezes, crackles, or rhonchi.    Abdomen: BS+, soft, nontender with no rebound or guarding, nondistended.  No hepatomegaly.   Genitourinary: Nephrostomy tube, recently emptied, with evidence of hematuria  Skin: Warm and dry.  No rashes.  Extremities: No cyanosis.  2+ peripheral pulses b/l.  Neuro: A&Ox3.  CNs grossly intact. No focal deficits.      LABS:                        8.8    10.71 )-----------( 267      ( 14 Jun 2023 06:40 )             27.3     06-14    136  |  96<L>  |  38<H>  ----------------------------<  91  4.0   |  24  |  10.29<H>    Ca    8.6      14 Jun 2023 06:40  Phos  5.8     06-14  Mg     2.00     06-14                  RADIOLOGY & ADDITIONAL TESTS:  Results Reviewed:   Imaging Personally Reviewed:  Electrocardiogram Personally Reviewed:    COORDINATION OF CARE:  Care Discussed with Consultants/Other Providers [Y/N]:  Prior or Outpatient Records Reviewed [Y/N]:

## 2023-06-15 NOTE — PROGRESS NOTE ADULT - PROBLEM SELECTOR PLAN 1
Pt with advanced kidney failure and no KINJAL in the setting of GI fluid losses (diarrhea), decreased PO intake, meds (Valsartan-HCTZ) and chronic NSAIDs use. Exact duration and etiology of kidney failure remains unknown. Pt presented with melanotic stool/diarrhea past 1 week. SCr significantly elevated at 16.77 on ER labs. No prior labs available to review. No urine studies available. Baca placed in ER with no urine return noted. Pt with hx of chronic NSAIDs use. Pt likely with ?advanced CKD. CT scan with obstructive uropathy seen - moderate right-sided hydroureteronephrosis secondary to an obstructing soft tissue mass present. Pt underwent R nephrostomy tube placement by IR on 6/8/23. Last HD treatment was on 6/12. Documented urine output is 500 cc (from R PCN) over the past 24 hours. Labs from today are pending, will continue to evaluate daily for HD needs. Strict I/O. Avoid ACEi/ARBS/IV contrast/NSAIDs/Nephrotoxins. Dose meds as per egfr. Monitor labs.

## 2023-06-15 NOTE — PROGRESS NOTE ADULT - ATTENDING COMMENTS
Chart reviewed. Vitals and Labs noted.   Pt seen and examined at bedside. Plan formulated with the resident. Agree with above progress note.    Appreciate PGY-1!  Spoke w/daughter @ bedside  Successful TOV  500cc from rt nephrostomy from yesterday    Dx:   Acute renal failure, metabolic acidosis. improving.   Obstructive uropathy/ hydronephrosis due to obstructing soft tissue mass, with possible component of ATN/AIN given daily NSAID intake.   Renal and urology eval appreciated.  (GI symptoms likely renal related: neg GI PCR and neg c.diff)  s/p Shiley and right nephrostomy placement given CT finding of soft tissue mass, causing b/l hydronephrosis.  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 left kidney UOP steadily improved  repeat renal US stable 6/11/23   hold ARB/HCTZ. Cont Norvasc for BP control, BP improved with dialysis  Renal planning to monitor her off dialysis (last HD was 6/12/23)  ?repeat renal U/S?  DVT ppx    - Dr. Fox Pena ([facility])  - [phone] .

## 2023-06-15 NOTE — PROGRESS NOTE ADULT - SUBJECTIVE AND OBJECTIVE BOX
[facility] DIVISION OF KIDNEY DISEASES AND HYPERTENSION   FOLLOW UP NOTE  --------------------------------------------------------------------------------  Chief Complaint: KINJAL due to obstructive uropathy, requiring RRT, now s/p R nephrostomy tube placement, monitoring for renal recovery     24 hour events/subjective: Pt. underwent R nephrostomy tube placement on 6/8. Last HD treatment was on 6/12. Pt. was seen and evaluated at bedside this morning. She continues to feel well, endorses no complaints. Denies any headaches, fevers/chills, chest pain, SOB, and LE edema.    PAST HISTORY  --------------------------------------------------------------------------------  No significant changes to PMH, PSH, FHx, SHx, unless otherwise noted    ALLERGIES & MEDICATIONS  --------------------------------------------------------------------------------  Allergies  No Known Allergies  Intolerances    Standing Inpatient Medications  amLODIPine   Tablet 5 milliGRAM(s) Oral daily  atorvastatin 10 milliGRAM(s) Oral at bedtime  chlorhexidine 2% Cloths 1 Application(s) Topical daily  cyanocobalamin 1000 MICROGram(s) Oral daily  heparin   Injectable 5000 Unit(s) SubCutaneous every 12 hours  levothyroxine 100 MICROGram(s) Oral daily  pantoprazole    Tablet 40 milliGRAM(s) Oral two times a day    PRN Inpatient Medications  acetaminophen     Tablet .. 650 milliGRAM(s) Oral every 6 hours PRN  acetaminophen     Tablet .. 975 milliGRAM(s) Oral every 6 hours PRN  guaiFENesin Oral Liquid (Sugar-Free) 100 milliGRAM(s) Oral every 6 hours PRN  ondansetron Injectable 4 milliGRAM(s) IV Push every 6 hours PRN  polyethylene glycol 3350 17 Gram(s) Oral daily PRN  senna 2 Tablet(s) Oral at bedtime PRN    REVIEW OF SYSTEMS  --------------------------------------------------------------------------------  All other systems were reviewed and are negative, except as noted.    VITALS/PHYSICAL EXAM  --------------------------------------------------------------------------------  T(C): 36.6 (06-15-23 @ 06:09), Max: 37.2 (06-14-23 @ 13:30)  HR: 82 (06-15-23 @ 06:09) (79 - 87)  BP: 153/67 (06-15-23 @ 06:09) (152/62 - 164/73)  RR: 17 (06-15-23 @ 06:09) (17 - 18)  SpO2: 96% (06-15-23 @ 06:09) (95% - 100%)  Wt(kg): --    06-14-23 @ 07:01  -  06-15-23 @ 07:00  --------------------------------------------------------  IN: 1280 mL / OUT: 500 mL / NET: 780 mL    Physical Exam:  Gen: elderly female, resting, NAD  HEENT: Anicteric  Pulm: CTA B/L  CV: S1S2+  Abd: Soft, +BS    Ext: No LE edema B/L  Neuro: Awake and alert  : +R nephrostomy tube with clear urine  Skin: Warm and dry  IV access: RIJ non-tunneled HD catheter    LABS/STUDIES  --------------------------------------------------------------------------------              8.7    9.78  >-----------<  276      [06-15-23 @ 06:50]              26.6     136  |  96  |  38  ----------------------------<  91      [06-14-23 @ 06:40]  4.0   |  24  |  10.29        Ca     8.6     [06-14-23 @ 06:40]      Mg     2.00     [06-14-23 @ 06:40]      Phos  5.8     [06-14-23 @ 06:40]    Creatinine Trend:  SCr 10.29 [06-14 @ 06:40]  SCr 8.24 [06-13 @ 05:11]  SCr 11.48 [06-12 @ 06:40]  SCr 9.81 [06-11 @ 04:44]  SCr 7.76 [06-10 @ 06:15]    HBsAb <3.0      [06-08-23 @ 20:50]  HBsAg Nonreact      [06-08-23 @ 20:50]  HBcAb Nonreact      [06-08-23 @ 20:50]  HCV 0.07, Nonreact      [06-08-23 @ 20:50]  HIV Nonreact      [06-07-23 @ 07:04]    ALESIA: titer 1:80, pattern Homogeneous      [06-07-23 @ 07:04]  ANCA: cANCA Negative, pANCA Negative, atypical ANCA Indeterminate Method interference due to ALESIA Fluorescence      [06-07-23 @ 07:04]  PLA2R: ROMELIA <1.8, IFA --      [06-07-23 @ 07:04]  Immunofixation Serum: No Monoclonal Band Identified. DONNELL La MD  Reference Range: None Detected      [06-07-23 @ 07:04]  SPEP Interpretation: Hypoalbuminemia with increased Alpha-1 and Alpha-2 fractions consistent  with acute phase reaction. YOVANA Logan M.D.      [06-07-23 @ 07:04]

## 2023-06-16 LAB
ANION GAP SERPL CALC-SCNC: 18 MMOL/L — HIGH (ref 7–14)
BUN SERPL-MCNC: 47 MG/DL — HIGH (ref 7–23)
CALCIUM SERPL-MCNC: 8.8 MG/DL — SIGNIFICANT CHANGE UP (ref 8.4–10.5)
CHLORIDE SERPL-SCNC: 92 MMOL/L — LOW (ref 98–107)
CO2 SERPL-SCNC: 23 MMOL/L — SIGNIFICANT CHANGE UP (ref 22–31)
CREAT SERPL-MCNC: 12.75 MG/DL — HIGH (ref 0.5–1.3)
EGFR: 3 ML/MIN/1.73M2 — LOW
GLUCOSE SERPL-MCNC: 100 MG/DL — HIGH (ref 70–99)
HCT VFR BLD CALC: 28 % — LOW (ref 34.5–45)
HGB BLD-MCNC: 9.5 G/DL — LOW (ref 11.5–15.5)
MAGNESIUM SERPL-MCNC: 2 MG/DL — SIGNIFICANT CHANGE UP (ref 1.6–2.6)
MCHC RBC-ENTMCNC: 31 PG — SIGNIFICANT CHANGE UP (ref 27–34)
MCHC RBC-ENTMCNC: 33.9 GM/DL — SIGNIFICANT CHANGE UP (ref 32–36)
MCV RBC AUTO: 91.5 FL — SIGNIFICANT CHANGE UP (ref 80–100)
NRBC # BLD: 0 /100 WBCS — SIGNIFICANT CHANGE UP (ref 0–0)
NRBC # FLD: 0 K/UL — SIGNIFICANT CHANGE UP (ref 0–0)
PHOSPHATE SERPL-MCNC: 6.7 MG/DL — HIGH (ref 2.5–4.5)
PLATELET # BLD AUTO: 389 K/UL — SIGNIFICANT CHANGE UP (ref 150–400)
POTASSIUM SERPL-MCNC: 4 MMOL/L — SIGNIFICANT CHANGE UP (ref 3.5–5.3)
POTASSIUM SERPL-SCNC: 4 MMOL/L — SIGNIFICANT CHANGE UP (ref 3.5–5.3)
RBC # BLD: 3.06 M/UL — LOW (ref 3.8–5.2)
RBC # FLD: 11.9 % — SIGNIFICANT CHANGE UP (ref 10.3–14.5)
SODIUM SERPL-SCNC: 133 MMOL/L — LOW (ref 135–145)
WBC # BLD: 13.69 K/UL — HIGH (ref 3.8–10.5)
WBC # FLD AUTO: 13.69 K/UL — HIGH (ref 3.8–10.5)

## 2023-06-16 PROCEDURE — 99233 SBSQ HOSP IP/OBS HIGH 50: CPT | Mod: GC

## 2023-06-16 RX ADMIN — PANTOPRAZOLE SODIUM 40 MILLIGRAM(S): 20 TABLET, DELAYED RELEASE ORAL at 17:26

## 2023-06-16 RX ADMIN — CHLORHEXIDINE GLUCONATE 1 APPLICATION(S): 213 SOLUTION TOPICAL at 11:39

## 2023-06-16 RX ADMIN — HEPARIN SODIUM 5000 UNIT(S): 5000 INJECTION INTRAVENOUS; SUBCUTANEOUS at 17:27

## 2023-06-16 RX ADMIN — ATORVASTATIN CALCIUM 10 MILLIGRAM(S): 80 TABLET, FILM COATED ORAL at 21:26

## 2023-06-16 RX ADMIN — Medication 100 MICROGRAM(S): at 05:20

## 2023-06-16 RX ADMIN — PREGABALIN 1000 MICROGRAM(S): 225 CAPSULE ORAL at 11:38

## 2023-06-16 RX ADMIN — PANTOPRAZOLE SODIUM 40 MILLIGRAM(S): 20 TABLET, DELAYED RELEASE ORAL at 06:29

## 2023-06-16 RX ADMIN — HEPARIN SODIUM 5000 UNIT(S): 5000 INJECTION INTRAVENOUS; SUBCUTANEOUS at 05:29

## 2023-06-16 RX ADMIN — AMLODIPINE BESYLATE 5 MILLIGRAM(S): 2.5 TABLET ORAL at 06:29

## 2023-06-16 NOTE — PROGRESS NOTE ADULT - ATTENDING COMMENTS
Chart reviewed. Vitals and Labs noted.   Pt seen and examined at bedside. Plan formulated with the resident. Agree with above progress note.    Appreciate PGY-1!  Spoke w/daughter @ bedside  No new symptoms overnight    Dx:   Acute renal failure, metabolic acidosis. improving.   Obstructive uropathy/ hydronephrosis due to obstructing soft tissue mass, with possible component of ATN/AIN given daily NSAID intake.   Renal and urology eval appreciated.  (GI symptoms likely renal related: neg GI PCR and neg c.diff)  s/p Shiley and right nephrostomy placement given CT finding of soft tissue mass, causing b/l hydronephrosis.  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 left kidney UOP steadily improved  repeat renal US stable 6/11/23   hold ARB/HCTZ. Cont Norvasc for BP control, BP improved with dialysis  Repeat renal U/S 6/15/23 unchanged  Cr worsened 6/16/23, HD to resume today (last HD was 6/12/23)  DVT ppx    - Dr. Fox Pena ([facility])  - [phone] .

## 2023-06-16 NOTE — CONSULT NOTE ADULT - SUBJECTIVE AND OBJECTIVE BOX
Interventional Radiology    Evaluate for Procedure: non tunneled to tunneled hd cath conversion.    HPI:   83y Female with acute renal failure, CT 6/6 showed moderate right-sided hydroureteronephrosis secondary to an obstructing soft tissue mass, now  s/p right nephrostomy tube placement on 6/8/23  in IR.     Allergies: No Known Allergies    Medications (Abx/Cardiac/Anticoagulation/Blood Products)  amLODIPine   Tablet: 5 milliGRAM(s) Oral (06-16 @ 06:29)  heparin   Injectable: 5000 Unit(s) SubCutaneous (06-16 @ 05:29)    Data:    T(C): 36.9  HR: 82  BP: 140/66  RR: 15  SpO2: 95%    -WBC 13.69 / HgB 9.5 / Hct 28.0 / Plt 389  -Na 133 / Cl 92 / BUN 47 / Glucose 100  -K 4.0 / CO2 23 / Cr 12.75  -ALT -- / Alk Phos -- / T.Bili --  -INR 1.30 / PTT --    Assessment/Plan:   83F w/ acute renal failure. IR previously placed nontunneled right IJ cath on 6/7. For tunneled hd cath conversion. per conversation w/ team will require long term HD. Pt also had right pcn placed by ir on 6/8.    -- IR will plan to perform non tunneled to tunneled hd cath conversion on 6/19  -- NPO at midnight prior to procedure  -- hold a.m. anticoagulation on procedure day  -- am labs and coags  -- please place IR procedure request order under Dr. Tony      - Non-emergent consults: Place IR consult order in [place]  - Emergent issues (pager): [facility] [phone]; [facility] [phone]; [phone]  - Scheduling questions: [facility] [phone]; [facility] [phone]  - Clinic/outpatient booking: [facility] [phone]; [facility] [phone]

## 2023-06-16 NOTE — CONSULT NOTE ADULT - CONSULT REQUESTED DATE/TIME
16-Jun-2023 13:51
07-Jun-2023 08:25
07-Jun-2023 13:18
07-Jun-2023 11:22
06-Jun-2023 21:59
07-Jun-2023 04:54

## 2023-06-16 NOTE — PROGRESS NOTE ADULT - PROBLEM SELECTOR PLAN 1
Pt with advanced kidney failure and no KINJAL in the setting of GI fluid losses (diarrhea), decreased PO intake, meds (Valsartan-HCTZ) and chronic NSAIDs use. Exact duration and etiology of kidney failure remains unknown. Pt presented with melanotic stool/diarrhea past 1 week. SCr significantly elevated at 16.77 on ER labs. No prior labs available to review. No urine studies available. Baca placed in ER with no urine return noted. Pt with hx of chronic NSAIDs use. Pt likely with ?advanced CKD. CT scan with obstructive uropathy seen - moderate right-sided hydroureteronephrosis secondary to an obstructing soft tissue mass present. Pt underwent R nephrostomy tube placement by IR on 6/8/23. Last HD treatment was on 6/12. Documented urine output is 650 cc (from R PCN) over the past 24 hours. Labs from today demonstrated elevated Scr and rising BUN. Will plan for HD today (6/16). Will continue to evaluate daily for HD needs. Strict I/O. Avoid ACEi/ARBS/IV contrast/NSAIDs/Nephrotoxins. Dose meds as per egfr. Monitor labs. Pt with advanced kidney failure and no KINJAL in the setting of GI fluid losses (diarrhea), decreased PO intake, meds (Valsartan-HCTZ) and chronic NSAIDs use. Exact duration and etiology of kidney failure remains unknown. Pt presented with melanotic stool/diarrhea past 1 week. SCr significantly elevated at 16.77 on ER labs. No prior labs available to review. No urine studies available. Baca placed in ER with no urine return noted. Pt with hx of chronic NSAIDs use. Pt likely with ?advanced CKD. CT scan with obstructive uropathy seen - moderate right-sided hydroureteronephrosis secondary to an obstructing soft tissue mass present. Pt underwent R nephrostomy tube placement by IR on 6/8/23. Last HD treatment was on 6/12. Documented urine output is 650 cc (from R PCN) over the past 24 hours. Labs from today demonstrated elevated Scr and rising BUN. Will plan for HD today (6/16). Will continue to evaluate daily for HD needs. Strict I/O. Avoid ACEi/ARBS/IV contrast/NSAIDs/Nephrotoxins. Dose meds as per egfr. Monitor labs.    Would recommend IR consult for tunneled HD catheter placement as she will need long term monitoring for renal recovery. Needs SW consult to establish with outpatient HD unit. Pt with advanced kidney failure and no KINJAL in the setting of GI fluid losses (diarrhea), decreased PO intake, meds (Valsartan-HCTZ) and chronic NSAIDs use. Exact duration and etiology of kidney failure remains unknown. Pt presented with melanotic stool/diarrhea past 1 week. SCr significantly elevated at 16.77 on ER labs. No prior labs available to review. No urine studies available. Baca placed in ER with no urine return noted. Pt with hx of chronic NSAIDs use. Pt likely with ?advanced CKD. CT scan with obstructive uropathy seen - moderate right-sided hydroureteronephrosis secondary to an obstructing soft tissue mass present. Pt underwent R nephrostomy tube placement by IR on 6/8/23. Last HD treatment was on 6/12. Documented urine output is 650 cc (from R PCN) over the past 24 hours. Labs from today demonstrated elevated Scr and rising BUN but she is stable and no urgent indication for HD today. Will plan for HD TOMORROW (6/17). Will continue to evaluate daily for HD needs. Strict I/O. Avoid ACEi/ARBS/IV contrast/NSAIDs/Nephrotoxins. Dose meds as per egfr. Monitor labs.    Would recommend IR consult for tunneled HD catheter placement as she will need long term monitoring for renal recovery. Needs SW consult to establish with outpatient HD unit.

## 2023-06-16 NOTE — PROGRESS NOTE ADULT - ATTENDING COMMENTS
KINJAL  Obstructive uropathy    Plan for HD tomorrow. Cont to monitor labs and Is/Os  Will cont to monitor for signs of renal recovery

## 2023-06-16 NOTE — PROGRESS NOTE ADULT - SUBJECTIVE AND OBJECTIVE BOX
[facility] DIVISION OF KIDNEY DISEASES AND HYPERTENSION   FOLLOW UP NOTE  --------------------------------------------------------------------------------  Chief Complaint: KINJAL due to obstructive uropathy, requiring RRT, now s/p R nephrostomy tube placement, monitoring for renal recovery     24 hour events/subjective: Pt. underwent R nephrostomy tube placement on 6/8. Last HD treatment was on 6/12. Pt. was seen and evaluated at bedside this morning. She continues to feel well, endorses no complaints. She continues to have output from her R PCN and states she is urinating. Scr remains elevated. Denies any headaches, fevers/chills, chest pain, SOB, and LE edema.    PAST HISTORY  --------------------------------------------------------------------------------  No significant changes to PMH, PSH, FHx, SHx, unless otherwise noted    ALLERGIES & MEDICATIONS  --------------------------------------------------------------------------------  Allergies  No Known Allergies  Intolerances    Standing Inpatient Medications  amLODIPine   Tablet 5 milliGRAM(s) Oral daily  atorvastatin 10 milliGRAM(s) Oral at bedtime  chlorhexidine 2% Cloths 1 Application(s) Topical daily  cyanocobalamin 1000 MICROGram(s) Oral daily  heparin   Injectable 5000 Unit(s) SubCutaneous every 12 hours  levothyroxine 100 MICROGram(s) Oral daily  pantoprazole    Tablet 40 milliGRAM(s) Oral two times a day    PRN Inpatient Medications  acetaminophen     Tablet .. 975 milliGRAM(s) Oral every 6 hours PRN  acetaminophen     Tablet .. 650 milliGRAM(s) Oral every 6 hours PRN  guaiFENesin Oral Liquid (Sugar-Free) 100 milliGRAM(s) Oral every 6 hours PRN  ondansetron Injectable 4 milliGRAM(s) IV Push every 6 hours PRN  polyethylene glycol 3350 17 Gram(s) Oral daily PRN  senna 2 Tablet(s) Oral at bedtime PRN    REVIEW OF SYSTEMS  --------------------------------------------------------------------------------  All other systems were reviewed and are negative, except as noted.    VITALS/PHYSICAL EXAM  --------------------------------------------------------------------------------  T(C): 36.9 (06-16-23 @ 06:00), Max: 37.2 (06-15-23 @ 14:25)  HR: 82 (06-16-23 @ 06:00) (82 - 86)  BP: 140/66 (06-16-23 @ 06:00) (140/66 - 154/58)  RR: 15 (06-16-23 @ 06:00) (15 - 18)  SpO2: 95% (06-16-23 @ 06:00) (95% - 98%)  Wt(kg): --    06-15-23 @ 07:01  -  06-16-23 @ 07:00  --------------------------------------------------------  IN: 800 mL / OUT: 650 mL / NET: 150 mL    Physical Exam:  Gen: elderly female, resting, NAD  HEENT: Anicteric  Pulm: CTA B/L  CV: S1S2+  Abd: Soft, +BS    Ext: Trace LE edema B/L  Neuro: Awake and alert  : +R nephrostomy tube with clear urine  Skin: Warm and dry  IV access: RIJ non-tunneled HD catheter    LABS/STUDIES  --------------------------------------------------------------------------------              9.5    13.69 >-----------<  389      [06-16-23 @ 06:41]              28.0     133  |  92  |  47  ----------------------------<  100      [06-16-23 @ 06:41]  4.0   |  23  |  12.75        Ca     8.8     [06-16-23 @ 06:41]      Mg     2.00     [06-16-23 @ 06:41]      Phos  6.7     [06-16-23 @ 06:41]    Creatinine Trend:  SCr 12.75 [06-16 @ 06:41]  SCr 11.53 [06-15 @ 06:50]  SCr 10.29 [06-14 @ 06:40]  SCr 8.24 [06-13 @ 05:11]  SCr 11.48 [06-12 @ 06:40]

## 2023-06-16 NOTE — PROGRESS NOTE ADULT - SUBJECTIVE AND OBJECTIVE BOX
PROGRESS NOTE:   Authored by Bowen Noland, PGY-1     Patient is a 83y old  Female who presents with a chief complaint of Nausea, diarrhea, and renal failure (15 Bowen 2023 09:23)      SUBJECTIVE / OVERNIGHT EVENTS:  NAEON. Pt denied fever, chill, chest pain, sob, n/v.     ADDITIONAL REVIEW OF SYSTEMS:    MEDICATIONS  (STANDING):  amLODIPine   Tablet 5 milliGRAM(s) Oral daily  atorvastatin 10 milliGRAM(s) Oral at bedtime  chlorhexidine 2% Cloths 1 Application(s) Topical daily  cyanocobalamin 1000 MICROGram(s) Oral daily  heparin   Injectable 5000 Unit(s) SubCutaneous every 12 hours  levothyroxine 100 MICROGram(s) Oral daily  pantoprazole    Tablet 40 milliGRAM(s) Oral two times a day    MEDICATIONS  (PRN):  acetaminophen     Tablet .. 975 milliGRAM(s) Oral every 6 hours PRN Severe Pain (7 - 10)  acetaminophen     Tablet .. 650 milliGRAM(s) Oral every 6 hours PRN Temp greater or equal to 38C (100.4F), Mild Pain (1 - 3), Moderate Pain (4 - 6)  guaiFENesin Oral Liquid (Sugar-Free) 100 milliGRAM(s) Oral every 6 hours PRN Cough  ondansetron Injectable 4 milliGRAM(s) IV Push every 6 hours PRN Nausea and/or Vomiting  polyethylene glycol 3350 17 Gram(s) Oral daily PRN Constipation  senna 2 Tablet(s) Oral at bedtime PRN Constipation      CAPILLARY BLOOD GLUCOSE        I&O's Summary    15 Bowen 2023 07:01  -  16 Jun 2023 07:00  --------------------------------------------------------  IN: 800 mL / OUT: 650 mL / NET: 150 mL        PHYSICAL EXAM:  Vital Signs Last 24 Hrs  T(C): 36.9 (16 Jun 2023 06:00), Max: 37.2 (15 Bowen 2023 14:25)  T(F): 98.5 (16 Jun 2023 06:00), Max: 98.9 (15 Bowen 2023 14:25)  HR: 82 (16 Jun 2023 06:00) (82 - 86)  BP: 140/66 (16 Jun 2023 06:00) (140/66 - 154/58)  BP(mean): --  RR: 15 (16 Jun 2023 06:00) (15 - 18)  SpO2: 95% (16 Jun 2023 06:00) (95% - 98%)    Parameters below as of 16 Jun 2023 06:00  Patient On (Oxygen Delivery Method): room air        General: Awake and alert.  No acute distress.  Head: Normocephalic, atraumatic.    Eyes: PERRL.  EOMI.  No scleral icterus.  No conjunctival pallor.  Mouth: Moist MM.  No oropharyngeal exudates.    Neck: Supple.  Full range of motion.  No JVD.  No LAD.  No thyromegaly.  Trachea midline.    Heart: RRR.  Normal S1 and S2.  No murmurs, rubs, or gallops.  No LE edema b/l.   Lungs: Nonlabored breathing.  Good inspiratory effort.  CTAB.  No wheezes, crackles, or rhonchi.    Abdomen: BS+, soft, nontender with no rebound or guarding, nondistended.  No hepatomegaly.   Genitourinary: Nephrostomy tube, recently emptied, with evidence of hematuria  Skin: Warm and dry.  No rashes.  Extremities: No cyanosis.  2+ peripheral pulses b/l.  Neuro: A&Ox3.  CNs grossly intact. No focal deficits.    LABS:                        8.7    9.78  )-----------( 276      ( 15 Bowen 2023 06:50 )             26.6     06-15    132<L>  |  93<L>  |  43<H>  ----------------------------<  89  3.5   |  21<L>  |  11.53<H>    Ca    8.5      15 Bowen 2023 06:50  Phos  5.9     06-15  Mg     2.00     06-15                  RADIOLOGY & ADDITIONAL TESTS:  Results Reviewed:   Imaging Personally Reviewed:  Electrocardiogram Personally Reviewed:    COORDINATION OF CARE:  Care Discussed with Consultants/Other Providers [Y/N]:  Prior or Outpatient Records Reviewed [Y/N]:

## 2023-06-17 ENCOUNTER — TRANSCRIPTION ENCOUNTER (OUTPATIENT)
Age: 83
End: 2023-06-17

## 2023-06-17 DIAGNOSIS — E83.39 OTHER DISORDERS OF PHOSPHORUS METABOLISM: ICD-10-CM

## 2023-06-17 LAB
ANION GAP SERPL CALC-SCNC: 18 MMOL/L — HIGH (ref 7–14)
BASOPHILS # BLD AUTO: 0.06 K/UL — SIGNIFICANT CHANGE UP (ref 0–0.2)
BASOPHILS NFR BLD AUTO: 0.7 % — SIGNIFICANT CHANGE UP (ref 0–2)
BUN SERPL-MCNC: 52 MG/DL — HIGH (ref 7–23)
CALCIUM SERPL-MCNC: 8.4 MG/DL — SIGNIFICANT CHANGE UP (ref 8.4–10.5)
CHLORIDE SERPL-SCNC: 91 MMOL/L — LOW (ref 98–107)
CO2 SERPL-SCNC: 21 MMOL/L — LOW (ref 22–31)
CREAT SERPL-MCNC: 13.53 MG/DL — HIGH (ref 0.5–1.3)
EGFR: 2 ML/MIN/1.73M2 — LOW
EOSINOPHIL # BLD AUTO: 0.56 K/UL — HIGH (ref 0–0.5)
EOSINOPHIL NFR BLD AUTO: 6.3 % — HIGH (ref 0–6)
GLUCOSE SERPL-MCNC: 95 MG/DL — SIGNIFICANT CHANGE UP (ref 70–99)
HCT VFR BLD CALC: 26.3 % — LOW (ref 34.5–45)
HGB BLD-MCNC: 9 G/DL — LOW (ref 11.5–15.5)
IANC: 5.67 K/UL — SIGNIFICANT CHANGE UP (ref 1.8–7.4)
IMM GRANULOCYTES NFR BLD AUTO: 1.5 % — HIGH (ref 0–0.9)
LYMPHOCYTES # BLD AUTO: 1.44 K/UL — SIGNIFICANT CHANGE UP (ref 1–3.3)
LYMPHOCYTES # BLD AUTO: 16.3 % — SIGNIFICANT CHANGE UP (ref 13–44)
MAGNESIUM SERPL-MCNC: 2.2 MG/DL — SIGNIFICANT CHANGE UP (ref 1.6–2.6)
MCHC RBC-ENTMCNC: 30.6 PG — SIGNIFICANT CHANGE UP (ref 27–34)
MCHC RBC-ENTMCNC: 34.2 GM/DL — SIGNIFICANT CHANGE UP (ref 32–36)
MCV RBC AUTO: 89.5 FL — SIGNIFICANT CHANGE UP (ref 80–100)
MONOCYTES # BLD AUTO: 0.97 K/UL — HIGH (ref 0–0.9)
MONOCYTES NFR BLD AUTO: 11 % — SIGNIFICANT CHANGE UP (ref 2–14)
NEUTROPHILS # BLD AUTO: 5.67 K/UL — SIGNIFICANT CHANGE UP (ref 1.8–7.4)
NEUTROPHILS NFR BLD AUTO: 64.2 % — SIGNIFICANT CHANGE UP (ref 43–77)
NRBC # BLD: 0 /100 WBCS — SIGNIFICANT CHANGE UP (ref 0–0)
NRBC # FLD: 0 K/UL — SIGNIFICANT CHANGE UP (ref 0–0)
PHOSPHATE SERPL-MCNC: 6.8 MG/DL — HIGH (ref 2.5–4.5)
PLATELET # BLD AUTO: 331 K/UL — SIGNIFICANT CHANGE UP (ref 150–400)
POTASSIUM SERPL-MCNC: 3.6 MMOL/L — SIGNIFICANT CHANGE UP (ref 3.5–5.3)
POTASSIUM SERPL-SCNC: 3.6 MMOL/L — SIGNIFICANT CHANGE UP (ref 3.5–5.3)
RBC # BLD: 2.94 M/UL — LOW (ref 3.8–5.2)
RBC # FLD: 11.8 % — SIGNIFICANT CHANGE UP (ref 10.3–14.5)
SODIUM SERPL-SCNC: 130 MMOL/L — LOW (ref 135–145)
WBC # BLD: 8.83 K/UL — SIGNIFICANT CHANGE UP (ref 3.8–10.5)
WBC # FLD AUTO: 8.83 K/UL — SIGNIFICANT CHANGE UP (ref 3.8–10.5)

## 2023-06-17 PROCEDURE — 99232 SBSQ HOSP IP/OBS MODERATE 35: CPT

## 2023-06-17 RX ORDER — HEPARIN SODIUM 5000 [USP'U]/ML
1000 INJECTION INTRAVENOUS; SUBCUTANEOUS ONCE
Refills: 0 | Status: COMPLETED | OUTPATIENT
Start: 2023-06-17 | End: 2023-06-17

## 2023-06-17 RX ADMIN — AMLODIPINE BESYLATE 5 MILLIGRAM(S): 2.5 TABLET ORAL at 05:29

## 2023-06-17 RX ADMIN — HEPARIN SODIUM 5000 UNIT(S): 5000 INJECTION INTRAVENOUS; SUBCUTANEOUS at 17:58

## 2023-06-17 RX ADMIN — HEPARIN SODIUM 1000 UNIT(S): 5000 INJECTION INTRAVENOUS; SUBCUTANEOUS at 14:20

## 2023-06-17 RX ADMIN — Medication 100 MICROGRAM(S): at 06:29

## 2023-06-17 RX ADMIN — ATORVASTATIN CALCIUM 10 MILLIGRAM(S): 80 TABLET, FILM COATED ORAL at 21:57

## 2023-06-17 RX ADMIN — HEPARIN SODIUM 5000 UNIT(S): 5000 INJECTION INTRAVENOUS; SUBCUTANEOUS at 05:29

## 2023-06-17 RX ADMIN — PANTOPRAZOLE SODIUM 40 MILLIGRAM(S): 20 TABLET, DELAYED RELEASE ORAL at 17:58

## 2023-06-17 RX ADMIN — PANTOPRAZOLE SODIUM 40 MILLIGRAM(S): 20 TABLET, DELAYED RELEASE ORAL at 05:29

## 2023-06-17 NOTE — DISCHARGE NOTE PROVIDER - NSDCCPCAREPLAN_GEN_ALL_CORE_FT
PRINCIPAL DISCHARGE DIAGNOSIS  Diagnosis: Renal failure, acute  Assessment and Plan of Treatment: You presented to the hospital for acute nausea and vomiting in the setting of anuria (no urine output), found to have acute renal failure secondary to pelvic mass obstructing the outlet of urinary tracts predominantly on the R side. Therefore, you had non-tunneled catheter for hemodialysis meanwhile received percutaneous nephrostomy tube placed at the R side to relieve obstruction and allow urine output. However, you became HD dependent as your kidney function remained compromised to elimnate toxin and waste adequately. 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  You will need to continue HD in the clinic after being discharged from hospital.   And you will need to continue follow-up with Oncology doctor in the clinic regarding further workup for pelvic mass found in the CT scan, highly concern for malignancy with unclear source given your history of high grade cervical lesions.      SECONDARY DISCHARGE DIAGNOSES  Diagnosis: GI bleed  Assessment and Plan of Treatment: You also reported dark stool in the past several days concerning for GI bleeding. Howedver, given the acuity of renal failure, further endoscopic investigation has been deferred in the outpatient setting since you are hemodynamically stable.  Please follow up with GI doctor closely for EGD and colonoscopy.    Diagnosis: Vaginal mass  Assessment and Plan of Treatment: During this hospital course, you developed intermittent vaginal bleeding and we called gynoncology doctor to the bedside for further investigation. You were found to have intravaginal mass concerning for malignancy. 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  Nevertheless, the further workup and treatment will be the same where you need to follow up closely with oncology doctor in the outpatient setting.    Diagnosis: Lung mass  Assessment and Plan of Treatment: During CT chest non-contrast, there were a few small lung mass and several enlarged lymphnodes that required follow-up investigation. Please discuss with your oncoloy doctor regarding these CT findings in the setting of malignancy workup.     PRINCIPAL DISCHARGE DIAGNOSIS  Diagnosis: Renal failure, acute  Assessment and Plan of Treatment: You presented to the hospital for acute nausea and vomiting in the setting of anuria (no urine output), found to have acute renal failure secondary to pelvic mass obstructing the outlet of urinary tracts predominantly on the R side. Therefore, you had non-tunneled catheter for hemodialysis meanwhile received percutaneous nephrostomy tube placed at the R side to relieve obstruction and allow urine output. However, you became HD dependent as your kidney function remained compromised to elimnate toxin and waste adequately. 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  You will need to continue HD in the clinic after being discharged from hospital.   And you will need to continue follow-up with Oncology doctor in the clinic regarding further workup for pelvic mass found in the CT scan, highly concern for malignancy with unclear source given your history of high grade cervical lesions. Please follow up with Interventional Radiology for Nephrostomy tube exchange as well.      SECONDARY DISCHARGE DIAGNOSES  Diagnosis: GI bleed  Assessment and Plan of Treatment: You also reported dark stool in the past several days concerning for GI bleeding. Howedver, given the acuity of renal failure, further endoscopic investigation has been deferred in the outpatient setting since you are hemodynamically stable.  Please follow up with GI doctor closely for EGD and colonoscopy.    Diagnosis: Vaginal mass  Assessment and Plan of Treatment: During this hospital course, you developed intermittent vaginal bleeding and we called gynoncology doctor to the bedside for further investigation. You were found to have intravaginal mass concerning for malignancy. 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  Nevertheless, the further workup and treatment will be the same where you need to follow up closely with oncology doctor in the outpatient setting.    Diagnosis: Lung mass  Assessment and Plan of Treatment: During CT chest non-contrast, there were a few small lung mass and several enlarged lymphnodes that required follow-up investigation. Please discuss with your oncoloy doctor regarding these CT findings in the setting of malignancy workup.     PRINCIPAL DISCHARGE DIAGNOSIS  Diagnosis: Renal failure, acute  Assessment and Plan of Treatment: You came to the hospital for nausea and vomiting and you were not urinating, we found that you had poor kidney function as you had a pelvic mass blocking the outlet of urinary tracts on the right side. You had a temporary catheter for hemodialysis and a tube (nephrostomy tube) placed at your right back to relieve obstruction and allow urine output. However, you became dependent on dialysis, as your kidney function remained compromised to elimnate toxin and waste adequately. You will need to continue HD in the clinic after being discharged from hospital.   Please stop taking your aspirin, alieve, and Diovan as these can worsen your kidney function. Please start taking Amlodipine 5 mg (1 pill) daily in order to control your blood pressure instead of the Diovan as this medication does not affect your kidney function as much.   You will need to follow-up with the kidney doctor (nephrologist) regarding your dialysis plan. Please follow-up at your dialysis center and follow your recommended schedule (Tuesday, Thursday, and Saturday Dialysis). Please also follow-up with your cancer doctor (oncologist at [facility]), and Gynecology Oncology after you leave the hospital for next steps for treatment. You will also need to follow-up with Interventional Radiology regarding the tube in your back as it will need to be exchanged in 3 months. If you have nausea, vomiting, or feel unwell, please return to the hospital.      SECONDARY DISCHARGE DIAGNOSES  Diagnosis: GI bleed  Assessment and Plan of Treatment: You had dark stool in the past several days concerning for bleeding from your intestines. We did not further investigate this as your blood counts remained stable. We gave you two new medications for this. Please take Cyanocobalamin (Vitamin) 1 pill daily to keep your blood counts up. Please also take Protonix 40 mg (1 pill) two times a day from 6/9 to 7/7, which will be a total of 4 weeks. You may stop the medication after that point. Please follow-up with a gastrointestinal doctor to discuss if you need a camera in your stomach and intestines to evaluate for blood. If you begin having dark stools or lightheadedness, please return to the hospital for evaluation.    Diagnosis: Vaginal mass  Assessment and Plan of Treatment: During this hospital course, you developed intermittent vaginal bleeding and we called gynoncology doctor to the bedside for further investigation. You were found to have intravaginal mass concerning for malignancy. 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  Nevertheless, the further workup and treatment will be the same where you need to follow up closely with gynecology-oncology doctor and the oncology doctor at [facility] in the outpatient setting.    Diagnosis: Lung mass  Assessment and Plan of Treatment: During a CT scan of your chest, there were a few small lung mass and several enlarged lymphnodes that required follow-up investigation as to the cause. Please discuss with your oncology doctor regarding these CT scan findings. If you have trouble breathing, please return to the hospital.     PRINCIPAL DISCHARGE DIAGNOSIS  Diagnosis: Renal failure, acute  Assessment and Plan of Treatment: You came to the hospital for nausea and vomiting and you were not urinating, we found that you had poor kidney function as you had a pelvic mass blocking the outlet of urinary tracts on the right side. You had a temporary catheter for hemodialysis and a tube (nephrostomy tube) placed at your right back to relieve obstruction and allow urine output. However, you became dependent on dialysis, as your kidney function remained compromised to eliminate toxin and waste adequately. You will need to continue dialysis after being discharged from hospital, please follow-up at your assigned dialysis center starting tomorrow on 6/23.  Please stop taking your aspirin, alieve, and diovan as these can worsen your kidney function. Please start taking Amlodipine 5 mg (1 pill) daily in order to control your blood pressure instead of the Diovan as this medication does not affect your kidney function as much.   You will need to follow-up with the kidney doctor (nephrologist Dr. Osvaldo Mayorga) regarding your dialysis plan and next steps. Please follow-up at your dialysis center and follow your recommended dialysis schedule. Please also follow-up with your cancer doctor (oncologist at [facility]), and Gynecology Oncology (Dr. Alcantar) after you leave the hospital for next steps for treatment. You will also need to follow-up with Interventional Radiology regarding the tube in your back as it will need to be exchanged in 3 months, which will be in September. Please also follow-up with your primary care provider Dr. Davy Saha as soon as possible. If you have nausea, vomiting, or feel unwell, please return to the hospital.      SECONDARY DISCHARGE DIAGNOSES  Diagnosis: GI bleed  Assessment and Plan of Treatment: You had dark stool in the past several days concerning for bleeding from your intestines. We did not further investigate this as your blood counts remained stable. We gave you two new medications for this. Please take Cyanocobalamin (Vitamin B12) 1 pill daily to keep your blood counts up. Please also take Protonix 40 mg (1 pill) two times a day from 6/9 to 7/7, which will be a total of 4 weeks. You may stop the Protonix after that point. Please follow-up with a gastrointestinal doctor to discuss if you need a camera in your stomach and intestines to evaluate for blood. If you begin having dark stools or lightheadedness, please return to the hospital for evaluation.    Diagnosis: Vaginal mass  Assessment and Plan of Treatment: During this hospital course, you developed intermittent vaginal bleeding and we called the gynecology doctor for further investigation. You were found to have a mass in your vaginal concerning for cancer, however it remains unclear if this is a new cancer or a return of the prior cervical masses you had. Nevertheless, the further workup and treatment will be the same where you need to follow up closely with your gynecology-oncology doctor(Dr. Alcantar) and the oncology doctor at [facility] after you leave the hospital.    Diagnosis: Lung mass  Assessment and Plan of Treatment: During a CT scan of your chest, there were a few small lung masses and several enlarged lymphnodes that require follow-up investigation as to the cause. Please discuss with your oncology doctor regarding these CT scan findings. If you have trouble breathing, please return to the hospital.    Diagnosis: HTN (hypertension)  Assessment and Plan of Treatment: We changed your blood pressure medication from Diovan to Amlodipine as Amlodipine does not affect your kidneys as much. Please follow-up with your primary care doctor to discuss any adjustments to your blood pressure medications.

## 2023-06-17 NOTE — DISCHARGE NOTE PROVIDER - NSFOLLOWUPCLINICSTOKEN_GEN_ALL_ED_FT
781382:2 weeks|| ||00\01||False; 870113:2 weeks|| ||00\01||False;164216: || ||00\01||False;300973: || ||00\01||False;

## 2023-06-17 NOTE — DISCHARGE NOTE PROVIDER - NSFOLLOWUPCLINICS_GEN_ALL_ED_FT
[facility]  Hematology/Oncology  [address] [id number]  Phone: [phone]  Fax:   Follow Up Time: 2 weeks     [facility]  Hematology/Oncology  [address] [place] [address]  Phone: [phone]  Fax:   Follow Up Time: 2 weeks    Gastroenterology at [facility]  Gastroenterology  [address] [place] [address]  Phone: [phone]  Fax:     [facility]  Gastroenterology  [address] [place] [address]  Phone: [phone]  Fax:

## 2023-06-17 NOTE — DISCHARGE NOTE PROVIDER - NSDCMRMEDTOKEN_GEN_ALL_CORE_FT
Aleve 220 mg oral tablet: 2 tab(s) orally once a day  aspirin 81 mg oral delayed release tablet: 1 tab(s) orally once a day  Caltrate 600 + D oral tablet: 1 tab(s) orally once a day  Diovan  mg-12.5 mg oral tablet: 1 tab(s) orally once a day  Levoxyl 100 mcg (0.1 mg) oral tablet: 1 tab(s) orally once a day  pravastatin 20 mg oral tablet: 1 tab(s) orally once a day   aspirin 81 mg oral delayed release tablet: 1 tab(s) orally once a day  Caltrate 600 + D oral tablet: 1 tab(s) orally once a day  Diovan  mg-12.5 mg oral tablet: 1 tab(s) orally once a day  Levoxyl 100 mcg (0.1 mg) oral tablet: 1 tab(s) orally once a day  pravastatin 20 mg oral tablet: 1 tab(s) orally once a day   amLODIPine 5 mg oral tablet: 1 tab(s) orally once a day  Caltrate 600 + D oral tablet: 1 tab(s) orally once a day  cyanocobalamin 1000 mcg oral tablet: 1 tab(s) orally once a day  Levoxyl 100 mcg (0.1 mg) oral tablet: 1 tab(s) orally once a day  pantoprazole 40 mg oral delayed release tablet: 1 tab(s) orally 2 times a day  pravastatin 20 mg oral tablet: 1 tab(s) orally once a day   amLODIPine 5 mg oral tablet: 1 tab(s) orally once a day  Caltrate 600 + D oral tablet: 1 tab(s) orally once a day  cyanocobalamin 1000 mcg oral tablet: 1 tab(s) orally once a day  Levoxyl 100 mcg (0.1 mg) oral tablet: 1 tab(s) orally once a day  pantoprazole 40 mg oral delayed release tablet: 1 tab(s) orally 2 times a day  pantoprazole 40 mg oral delayed release tablet: 1 tab(s) orally 2 times a day Please take 1 pill twice a day until 7/7. Your last dose will be on 7/7  pravastatin 20 mg oral tablet: 1 tab(s) orally once a day

## 2023-06-17 NOTE — PROGRESS NOTE ADULT - ATTENDING COMMENTS
Chart reviewed. Vitals and Labs noted.   Pt seen and examined at bedside. Plan formulated with the resident. Agree with above progress note.    Appreciate PGY-1!  Spoke w/daughter @ bedside  She and pt brought up three lingering issues during her hospital course:    1) Sensation of gagging with solids/liquids with occasional nausea but no bilious vomiting or reflux-type symptoms  2) Sharp pain medial left foreleg --> did not appreciate any localized swelling or overlying skin lesions, nor was there any swelling of the knee  3) Pt thought she felt urine coming from buttock area in the bathroom this morning, but did not report seeing any fecal material  or bubbles in the urine, nor was there any dysuria    Dx:   Acute renal failure, metabolic acidosis. improving.   Obstructive uropathy/ hydronephrosis due to obstructing soft tissue mass, with possible component of ATN/AIN given daily NSAID intake.   Renal and urology eval appreciated.  (GI symptoms likely renal related: neg GI PCR and neg c.diff)  s/p Shiley and right nephrostomy placement given CT finding of soft tissue mass, causing b/l hydronephrosis.  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 left kidney UOP steadily improved  repeat renal US stable 6/11/23   hold ARB/HCTZ. Cont Norvasc for BP control, BP improved with dialysis  Repeat renal U/S 6/15/23 unchanged  Cr worsened 6/16/23, HD to resume today (last HD was 6/12/23)  Tunneled HD catheter 6/19/23  DVT ppx    - Dr. Fox Pena ([facility])  - [phone] .

## 2023-06-17 NOTE — DISCHARGE NOTE PROVIDER - NPI NUMBER (FOR SYSADMIN USE ONLY) :
[[id number]] [[id number]],[[id number]] [[id number]],[[id number]],[[id number]] [[id number]],[[id number]],[[id number]],[[id number]]

## 2023-06-17 NOTE — DISCHARGE NOTE PROVIDER - CARE PROVIDER_API CALL
Zeeshan Pretty  Gynecologic Oncology  [address] [place] [address]  Phone: [phone]  Fax: [phone]  Follow Up Time: 2 weeks   Zeeshan Pretty  Gynecologic Oncology  [address] [place] [id number]  Phone: [phone]  Fax: [phone]  Follow Up Time: 2 weeks    Paulo Wilcox)  Diagnostic Radiology  [address] [place] [address]  Phone: [phone]  Fax: [phone]  Follow Up Time: 2 months   Zeeshan Pretty  Gynecologic Oncology  [address] [place] [address]  Phone: [phone]  Fax: [phone]  Follow Up Time: 2 weeks    Paulo Wilcox)  Diagnostic Radiology  [address] [place] [address]  Phone: [phone]  Fax: [phone]  Follow Up Time: 2 months    Osvaldo Mayorga  Nephrology  [address] [place] [address]  Phone: [phone]  Fax: [phone]  Follow Up Time:    Zeeshan Pretty  Gynecologic Oncology  [address] [place] [address]  Phone: [phone]  Fax: [phone]  Follow Up Time: 2 weeks    Paulo Wilcox)  Diagnostic Radiology  [address] [place] [address]  Phone: [phone]  Fax: [phone]  Follow Up Time: 2 months    Osvaldo Mayorga  Nephrology  [address] [place] [address]  Phone: [phone]  Fax: [phone]  Follow Up Time:     Davy Saha  Internal Medicine  [address] [place] [address]  Phone: [phone]  Fax: [phone]  Follow Up Time:

## 2023-06-17 NOTE — DISCHARGE NOTE PROVIDER - NSDCFUADDAPPT_GEN_ALL_CORE_FT
- stress hyperglycemia  - patient doesn't have diabetes based on normal a1c  - continue SSI   Recommendation:   - chemotherapy and palliative pelvic RT;   - follow up PDL1 testing;   - PET CT scan  - [facility]   Recommendation:   - chemotherapy and palliative pelvic RT;   - follow up PDL1 testing;   - PET CT scan  - [facility]    Please follow up with Interventional Radiology (Dr. Wilcox) for nephrostomy tube exchange in 3 months.   IR office can be reached at [phone] to set up an appointment.  Please follow-up with these physicians:  -Your primary care doctor to discuss your hospitalization  -The oncologist at [facility], Gyn-Onc (Dr. Pretty)  -The Nephrologist (Dr. Mayorga, Kidney doctor)  -A gastrointestinal doctor (Number is attached to the clinic).   -Interventional Radiology (Dr. Wilcox) for nephrostomy tube exchange in 3 months. The office can be reached at [phone] to set up an appointment.  Please follow-up with these physicians:  -Your primary care doctor (Dr. Davy Saha) to discuss your hospitalization and next steps  -The oncologist at [facility] (Number provided)   -Gynecology-Oncology (Dr. Pretty)  -The Nephrologist (Dr. Mayorga, Kidney doctor)  -A gastrointestinal doctor (Number is attached to the clinic).   -Interventional Radiology (Dr. Wilcox) for nephrostomy tube exchange in 3 months. The office can be reached at [phone] to set up an appointment.

## 2023-06-17 NOTE — PROGRESS NOTE ADULT - PROBLEM SELECTOR PLAN 2
Pt. with hyperphosphatemia in the setting of renal failure. Serum phosphorus is elevated at 6.8 today. Plan for HD today. Low phosphorus diet. Consider starting phosphorus binder if serum phosphorus remains elevated. Monitor serum phosphorus, goal: 3.5-5.5.    If you have any questions, please feel free to contact me  Casandra Pleitez  Nephrology Fellow  [phone] / [employer] Teams(Preferred)  (After 5pm or on weekends please page the on-call fellow)

## 2023-06-17 NOTE — DISCHARGE NOTE PROVIDER - CARE PROVIDERS DIRECT ADDRESSES
,DirectAddress_Unknown ,DirectAddress_Unknown,[email or web address] ,[email or web address],[email or web address],DirectAddress_Unknown ,DirectAddress_Unknown,[email or web address],DirectAddress_Unknown,DirectAddress_Unknown

## 2023-06-17 NOTE — PROGRESS NOTE ADULT - SUBJECTIVE AND OBJECTIVE BOX
PROGRESS NOTE:     Patient is a 83y old  Female who presents with a chief complaint of Nausea, diarrhea, and renal failure (16 Jun 2023 13:50)      SUBJECTIVE / OVERNIGHT EVENTS:  No acute events overnight.     MEDICATIONS  (STANDING):  amLODIPine   Tablet 5 milliGRAM(s) Oral daily  atorvastatin 10 milliGRAM(s) Oral at bedtime  chlorhexidine 2% Cloths 1 Application(s) Topical daily  cyanocobalamin 1000 MICROGram(s) Oral daily  heparin   Injectable 5000 Unit(s) SubCutaneous every 12 hours  levothyroxine 100 MICROGram(s) Oral daily  pantoprazole    Tablet 40 milliGRAM(s) Oral two times a day    MEDICATIONS  (PRN):  acetaminophen     Tablet .. 975 milliGRAM(s) Oral every 6 hours PRN Severe Pain (7 - 10)  acetaminophen     Tablet .. 650 milliGRAM(s) Oral every 6 hours PRN Temp greater or equal to 38C (100.4F), Mild Pain (1 - 3), Moderate Pain (4 - 6)  guaiFENesin Oral Liquid (Sugar-Free) 100 milliGRAM(s) Oral every 6 hours PRN Cough  ondansetron Injectable 4 milliGRAM(s) IV Push every 6 hours PRN Nausea and/or Vomiting  polyethylene glycol 3350 17 Gram(s) Oral daily PRN Constipation  senna 2 Tablet(s) Oral at bedtime PRN Constipation      CAPILLARY BLOOD GLUCOSE        I&O's Summary    16 Jun 2023 07:01  -  17 Jun 2023 07:00  --------------------------------------------------------  IN: 780 mL / OUT: 550 mL / NET: 230 mL        PHYSICAL EXAM:  Vital Signs Last 24 Hrs  T(C): 36.8 (17 Jun 2023 05:16), Max: 36.9 (16 Jun 2023 13:10)  T(F): 98.2 (17 Jun 2023 05:16), Max: 98.4 (16 Jun 2023 13:10)  HR: 80 (17 Jun 2023 05:16) (80 - 84)  BP: 151/62 (17 Jun 2023 05:16) (142/60 - 151/62)  BP(mean): --  RR: 15 (17 Jun 2023 05:16) (15 - 17)  SpO2: 95% (17 Jun 2023 05:16) (95% - 97%)    Parameters below as of 17 Jun 2023 05:16  Patient On (Oxygen Delivery Method): room air        General: Awake and alert.  No acute distress.  Head: Normocephalic, atraumatic.    Eyes: PERRL.  EOMI.  No scleral icterus.  No conjunctival pallor.  Mouth: Moist MM.  No oropharyngeal exudates.    Neck: Supple.  Full range of motion.  No JVD.  No LAD.  No thyromegaly.  Trachea midline.    Heart: RRR.  Normal S1 and S2.  No murmurs, rubs, or gallops.  No LE edema b/l.   Lungs: Nonlabored breathing.  Good inspiratory effort.  CTAB.  No wheezes, crackles, or rhonchi.    Abdomen: BS+, soft, nontender with no rebound or guarding, nondistended.  No hepatomegaly.   Genitourinary: Nephrostomy tube, recently emptied, with evidence of hematuria  Skin: Warm and dry.  No rashes.  Extremities: No cyanosis.  2+ peripheral pulses b/l.  Neuro: A&Ox3.  CNs grossly intact. No focal deficits.    LABS:                        9.5    13.69 )-----------( 389      ( 16 Jun 2023 06:41 )             28.0     06-16    133<L>  |  92<L>  |  47<H>  ----------------------------<  100<H>  4.0   |  23  |  12.75<H>    Ca    8.8      16 Jun 2023 06:41  Phos  6.7     06-16  Mg     2.00     06-16                  RADIOLOGY & ADDITIONAL TESTS:  Results Reviewed:   Imaging Personally Reviewed:  Electrocardiogram Personally Reviewed:    COORDINATION OF CARE:  Care Discussed with Consultants/Other Providers [Y/N]:  Prior or Outpatient Records Reviewed [Y/N]:

## 2023-06-17 NOTE — DISCHARGE NOTE PROVIDER - NSDCCPTREATMENT_GEN_ALL_CORE_FT
PRINCIPAL PROCEDURE  Procedure: CT abdomen and pelvis without contrast  Findings and Treatment:   < end of copied text >  FINDINGS:  Evaluation of the solid visceral organs and vasculature is limited   without the administration of intravenous contrast.  LOWER CHEST: Trace right pleural effusion. Bibasilar subsegmental   atelectasis.  LIVER: Unremarkable  BILE DUCTS: Unremarkable  GALLBLADDER: Cholecystectomy.  SPLEEN: Unremarkable  PANCREAS: Unremarkable  ADRENALS: Indeterminate 1.9 cm left adrenal nodule.  KIDNEYS/URETERS: Moderate right-sided hydroureteronephrosis, with   obstruction proximal to the UVJ in the region of a 3.6 x 2.9 x 4.6 cm   bulbous soft tissue mass inseparable from the cervical remnant. Mild   left-sided hydroureteronephrosis to the level of the UVJ. No radiopaque   nephrolithiasis nor ureterolithiasis. Mild bilateral perinephric edema..  BLADDER: Decompressed by Abca catheter with surrounding fat stranding  REPRODUCTIVE ORGANS: Hysterectomy with nodular soft tissue mass arising   from the right aspect of the cervical remnant, detailed above  BOWEL: No bowel obstruction. Appendectomy.Fluid filled large colon   compatible with a nonspecific diarrheal illness.  PERITONEUM: No free air.  VESSELS: Atherosclerotic changes.  RETROPERITONEUM/LYMPH NODES: Markedly enlarged bilateral obturator lymph   nodes,, the largest on the right measures 3.2 x 2.1 cm. Left para-aortic   and common iliac chain adenopathy is also present, the largest node in   the left common iliac chain is 2.2 x 1.8 cm  ABDOMINAL WALL: Postsurgical changes.  BONES: Degenerative changes.  IMPRESSION:  1. Moderate right-sided hydroureteronephrosis secondary to an obstructing   soft tissue mass inseparable from the cervical remnant. Clinical workup   to evaluate for primary neoplasm advised. Bulky bilateral pelvic lymph   nodes and left retroperitoneal lymph node suspicious for metastatic   disease.< from: CT Abdomen and Pelvis No Cont (06.06.23 @ 23:30) >        SECONDARY PROCEDURE  Procedure: CT chest wo con  Findings and Treatment:   < end of copied text >  PROCEDURE:  CT of the Chest was performed.  Sagittal and coronal reformats were performed.  FINDINGS:  LUNGS AND AIRWAYS: Patent central airways.   5 mm nodules within right   upper lobe (2-53) and right lower lobe (2-114).  PLEURA: Small bilateral pleural effusions.  MEDIASTINUM AND DUANE: Borderline and nonenlarged left supraclavicular   lymph nodes measuring up to 1.5 x 0.9 cm (2-7). Subcentimetermediastinal   lymph nodes. Enlarged subcarinal lymph node measuring 1.5 cm short axis.  VESSELS: Aortic and coronary artery calcifications. Right IJ central   venous catheter terminating in the SVC.  HEART: Heart size is normal. No pericardial effusion. Aortic valve   calcifications.  CHEST WALL AND LOWER NECK: Within normal limits.  VISUALIZED UPPER ABDOMEN: Moderate right-sided hydronephrosis.   Cholecystectomy. Please review the report of CT abdomen dated 6/6/2023.  BONES: No aggressive osseous lesions identified.  IMPRESSION:  Borderline and nonenlarged left supraclavicular lymph nodes amenable to sonographically guided biopsy.  Enlarged subcarinal lymph node measuring 1.5 cm in short axis. Further evaluation can be performed with PET CT.  Nonspecific 5 mm nodules within right upper lobe and right lower lobe. Recommend attention on follow-up.< from: CT Chest No Cont (06.07.23 @ 21:48) >      Procedure: CT head wo con  Findings and Treatment:   < end of copied text >  FINDINGS:  The ventricles and sulci are prominent, compatible with age-related   generalized cerebral volume loss.   There is no CT evidence for acute   cerebral cortical infarct. There is no evidence of hemorrhage. There is   periventricular and subcortical white matter hypoattenuation,  most   compatible with chronic microvascular ischemic changes.   No mass effect   is found in the brain.  There is no midline shift or herniation pattern.  The visualized portions of the paranasal sinuses and mastoid air cells   are clear.  IMPRESSION:  No evidence of acute intracranial abnormality.  No evidence of   hemorrhage. No evidence of vasogenic edema is present to suggest   metastases, however evaluation is limited on this noncontrast CT exam.  < from: CT Head No Cont (06.07.23 @ 21:48) >      Procedure: Ultrasound, kidney and bladder  Findings and Treatment: PROCEDURE DATE:  06/15/2023    INTERPRETATION:  CLINICAL INFORMATION: Renal failure.  COMPARISON: 6/12/2023.  TECHNIQUE: Sonography of the kidneys and bladder.  FINDINGS:  Right kidney: 10.1 cm. No renal mass, hydronephrosis or calculi. 2 simple   cysts measuring up to 1.6 cm. Vertical thinning.  Left kidney: 12.6 cm. Mild hydronephrosis, unchanged. A simple cyst of   1.3 cm in the lower pole.  Urinary bladder: Not visualized.  IMPRESSION:  Mild left hydronephrosis, unchanged.  < from: US Kidney and Bladder (06.15.23 @ 12:55) >  PROCEDURE DATE:  06/12/2023    INTERPRETATION:  CLINICAL INFORMATION: Acute renal failure.  COMPARISON: CT scan 6/7/2023  TECHNIQUE: Sonography of the kidneys and bladder.  FINDINGS:  Right kidney: 9.9 cm. Renal cortical thinning. Midpole cyst measures 1.2   cm. No calculus or hydronephrosis.  Left kidney: 11.5 cm. Mild hydronephrosis. No mass or calculus.  Urinary bladder: Decompressed with a Baca catheter.  IMPRESSION:  Mild left hydronephrosis.  Right renal cyst and cortical thinning.  < from: US Kidney and Bladder (06.12.23 @ 11:32) >

## 2023-06-17 NOTE — DISCHARGE NOTE PROVIDER - HOSPITAL COURSE
82 yo woman with history of HTN, HLD, hypothyroidism, and high-grade squamous intraepithelial lesion s/p MILVIA/BSO (2008) presents with nausea and diarrhea, including with melena, for past ~1 week. Found to be in acute renal failure 2/2 an obstructing soft tissue mass inseparable from the cervical remnant and proximal to the UVJ, also with possible GIB. s/p Non-tunneled catheter for HD and R nephrostomy tube placement for R side obstructive uropathy from pelvic mass. Dependent on HD since then.  c/b vaginal bleeding, found to have vaginal mass s/p biopsy with pathology positive for squamous cell carcinoma, unclear if this is primary malignancy or relapse from remote cervical lesions. Patient will follow up with [facility] outpatient for further diagnosis and management, no inpatient surgical intervention indicated.  Near the end of hospital course, non-tunneled cathter switched to permcath for longer term HD.     In addition, no active GI bleeding observed and H/H remained stable during this hospital course, will need to follow up with GI outpatient for further endoscopic investigation. At this point, pt has been medically stable and ready for discharge.      82 yo woman with history of HTN, HLD, hypothyroidism, and high-grade squamous intraepithelial lesion s/p MILVIA/BSO (2008) presents with nausea and diarrhea, including with melena, for past ~1 week. Found to be in acute renal failure 2/2 an obstructing soft tissue mass inseparable from the cervical remnant and proximal to the UVJ, also with possible GIB. s/p Non-tunneled catheter for HD and R nephrostomy tube placement for R side obstructive uropathy from pelvic mass. Dependent on HD since then.  c/b vaginal bleeding, found to have vaginal mass s/p biopsy with pathology positive for squamous cell carcinoma, unclear if this is primary malignancy or relapse from remote cervical lesions. Patient will follow up with [facility] outpatient for further diagnosis and management, no inpatient surgical intervention indicated.  Near the end of hospital course, non-tunneled cathter switched to permcath for longer term HD.     In addition, no active GI bleeding observed and H/H remained stable during this hospital course, will need to follow up with GI outpatient for further endoscopic investigation. At this point, pt has been medically stable and ready for discharge.     Patient will be discharged with Right Nephrostomy tube. She will receive home nursing visit for daily flushes with 10cc normal saline. She will follow up with Interventional Radiology for nephrostomy replacement in 3 months. 82 yo woman with history of HTN, HLD, hypothyroidism, and high-grade squamous intraepithelial lesion s/p MILVIA/BSO (2008) presents with nausea and diarrhea, including with melena, for past ~1 week. Found to be in acute renal failure 2/2 an obstructing soft tissue mass inseparable from the cervical remnant and proximal to the UVJ, also with possible GIB. s/p Non-tunneled catheter for HD and R nephrostomy tube placement for R side obstructive uropathy from pelvic mass. Dependent on HD since then.  c/b vaginal bleeding, found to have vaginal mass s/p biopsy with pathology positive for squamous cell carcinoma, unclear if this is primary malignancy or relapse from remote cervical lesions. Patient will follow up with [facility] outpatient for further diagnosis and management, no inpatient surgical intervention indicated.  Near the end of hospital course, non-tunneled cathter switched to permcath for longer term HD.     In addition, no active GI bleeding observed and H/H remained stable during this hospital course, will need to follow up with GI outpatient for further endoscopic investigation. At this point, pt has been medically stable and ready for discharge.     Patient will be discharged with Right Nephrostomy tube. She will receive home nursing visit for daily flushes with 10cc normal saline. She will follow up with Interventional Radiology for nephrostomy replacement in 3 months.     Medication Changes:    Follow-ups:  -Primary care provider  -Interventional Radiology in 3 months (September) for nephrostomy tube replacement  -Nephrology (Dr. Mayorga)  -Gynecology-Oncology (Dr. Alcantar)  -Hematology-Oncology ([facility]) 82 yo woman with history of HTN, HLD, hypothyroidism, and high-grade squamous intraepithelial lesion s/p MILVIA/BSO (2008) presents with nausea and diarrhea, including with melena, for past ~1 week. Found to be in acute renal failure 2/2 an obstructing soft tissue mass inseparable from the cervical remnant and proximal to the UVJ, also with possible GIB. s/p Non-tunneled catheter for HD and R nephrostomy tube placement for R side obstructive uropathy from pelvic mass. Dependent on HD since then.  c/b vaginal bleeding, found to have vaginal mass s/p biopsy with pathology positive for squamous cell carcinoma, unclear if this is primary malignancy or relapse from remote cervical lesions. Patient will follow up with [facility] outpatient for further diagnosis and management, no inpatient surgical intervention indicated.  Near the end of hospital course, non-tunneled cathter switched to permcath for longer term HD.     In addition, no active GI bleeding observed and H/H remained stable during this hospital course, will need to follow up with GI outpatient for further endoscopic investigation. At this point, pt has been medically stable and ready for discharge.     Patient will be discharged with Right Nephrostomy tube. She will receive home nursing visit for daily flushes with 10cc normal saline. She will follow up with Interventional Radiology for nephrostomy replacement in 3 months.     Medication Changes:    Follow-ups:  -Primary care provider  -Interventional Radiology in 3 months (September) for nephrostomy tube replacement  -Nephrology (Dr. Mayorga)  -Gynecology-Oncology (Dr. Alcantar)  -Hematology-Oncology ([facility])    Medication Changes:  -Stop Aspirin  -Stop Diovan  -Start Amlodipine 5 mg daily  -Start Pantoprazole 40 mg BID for 4 weeks (6/9-7/7)  -Start Cyanocobalamin 82 yo woman with history of HTN, HLD, hypothyroidism, and high-grade squamous intraepithelial lesion s/p MILVIA/BSO (2008) presents with nausea and diarrhea, including with melena, for past ~1 week. Found to be in acute renal failure 2/2 an obstructing soft tissue mass inseparable from the cervical remnant and proximal to the UVJ, also with possible GIB. s/p Non-tunneled catheter for HD and R nephrostomy tube placement for R side obstructive uropathy from pelvic mass. Dependent on HD since then.  c/b vaginal bleeding, found to have vaginal mass s/p biopsy with pathology positive for squamous cell carcinoma, unclear if this is primary malignancy or relapse from remote cervical lesions. Patient will follow up with [facility] outpatient for further diagnosis and management, no inpatient surgical intervention indicated.  Near the end of hospital course, non-tunneled cathter switched to permcath for longer term HD.     In addition, no active GI bleeding observed and H/H remained stable during this hospital course, will need to follow up with GI outpatient for further endoscopic investigation. At this point, pt has been medically stable and ready for discharge.     Patient will be discharged with Right Nephrostomy tube. She will receive home nursing visit for daily flushes with 10cc normal saline. She will follow up with Interventional Radiology for nephrostomy replacement in 3 months.     Medication Changes:    Follow-ups:  -Primary care provider (Dr. Davy Saha) to discuss hospitalization and evaluate lung nodules on CT chest scan.   -Interventional Radiology (Dr. Wilcox) in 3 months (September) for nephrostomy tube replacement  -Nephrology (Dr. Mayorga) given patient is on dialysis now  -Gynecology-Oncology (Dr. Alcantar) given that patient has a new soft tissue pelvic mass  -Hematology-Oncology ([facility]) given that patient has a new soft tissue pelvic mass  -Gastroenterology given dark stools while admitted    Medication Changes:  -Stop Aspirin  -Stop Diovan  -Start Amlodipine 5 mg daily  -Start Pantoprazole 40 mg BID for 4 weeks (6/9-7/7)  -Start Cyanocobalamin 82 yo woman with history of HTN, HLD, hypothyroidism, and high-grade squamous intraepithelial lesion s/p MILVIA/BSO (2008) presents with nausea and diarrhea, including with melena, for past ~1 week. Found to be in acute renal failure 2/2 an obstructing soft tissue mass inseparable from the cervical remnant and proximal to the UVJ, also with possible GIB. s/p Non-tunneled catheter for HD and R nephrostomy tube placement for R side obstructive uropathy from pelvic mass. Dependent on HD since then.  c/b vaginal bleeding, found to have vaginal mass s/p biopsy with pathology positive for squamous cell carcinoma, unclear if this is primary malignancy or relapse from remote cervical lesions. Patient will follow up with [facility] outpatient for further diagnosis and management, no inpatient surgical intervention indicated.  Near the end of hospital course, non-tunneled cathter switched to permcath for longer term HD.     In addition, no active GI bleeding observed and H/H remained stable during this hospital course, will need to follow up with GI outpatient for further endoscopic investigation. At this point, pt has been medically stable and ready for discharge.     Patient will be discharged with Right Nephrostomy tube. She will receive home nursing visit for daily flushes with 10cc normal saline. She will follow up with Interventional Radiology for nephrostomy replacement in 3 months.     Medication Changes:    Follow-ups:  -Primary care provider (Dr. Davy Saha) to discuss hospitalization and evaluate lung nodules on CT chest scan.   -Interventional Radiology (Dr. Wilcox) in 3 months (September) for nephrostomy tube replacement  -Nephrology (Dr. Mayorga) given patient is on dialysis now  -Gynecology-Oncology (Dr. Alcantar) given that patient has a new soft tissue pelvic mass  -Hematology-Oncology ([facility]) given that patient has a new soft tissue pelvic mass  -Gastroenterology given dark stools while admitted    Medication Changes:  -Stop Aspirin  -Stop Diovan  -Start Amlodipine 5 mg daily  -Start Pantoprazole 40 mg BID for 4 weeks (6/9-7/7)  -Start Cyanocobalamin    Attending Addendum:  Patient seen and examined by me on the discharge day. Medications reviewed.   Feeling much better. No cp, no sob. no abd pain. no n/v/d. no HA, no Dizziness.  All questions answered in details. Follow up plan explained. d/w NP/PA.  More than 30 mins were spent evaluating patient and coordinating care for discharge.  Discharge summary sent to pt's primary care physician at [facility].

## 2023-06-17 NOTE — PROGRESS NOTE ADULT - ATTENDING COMMENTS
seen and evaluated with daughter at bedside.  no chest pain no shortness of breath tolerating HD.  plan is for HD today and outpatient HD planning.

## 2023-06-17 NOTE — DISCHARGE NOTE PROVIDER - PROVIDER TOKENS
PROVIDER:[TOKEN:[[id number]],FOLLOWUP:[2 weeks]] PROVIDER:[TOKEN:[[id number]],FOLLOWUP:[2 weeks]],PROVIDER:[TOKEN:[[id number]],FOLLOWUP:[2 months]] PROVIDER:[TOKEN:[[id number]],FOLLOWUP:[2 weeks]],PROVIDER:[[id number]],FOLLOWUP:[2 months]],PROVIDER:[TOKEN:[[id number]]] PROVIDER:[TOKEN:[[id number]],FOLLOWUP:[2 weeks]],PROVIDER:[TOKEN:[[id number]],FOLLOWUP:[2 months]],PROVIDER:[TOKEN:[[id number]]],PROVIDER:[TOKEN:[[id number]]]

## 2023-06-17 NOTE — PROGRESS NOTE ADULT - SUBJECTIVE AND OBJECTIVE BOX
[facility] Division of Kidney Diseases & Hypertension  FOLLOW UP NOTE  [phone]--------------------------------------------------------------------------------    Chief Complaint: KINJAL due to obstructive uropathy, requiring RRT, now s/p R nephrostomy tube placement, monitoring for renal recovery     24 hour events/subjective: Pt. underwent R nephrostomy tube placement on 6/8. Last HD treatment was on 6/12. Pt. was seen and evaluated at bedside this morning. She continues to feel well, endorses no complaints. She continues to have output from her R PCN and states she is urinating. Scr remains elevated. Denies any headaches, fevers/chills, chest pain, SOB, and LE edema.      PAST HISTORY  --------------------------------------------------------------------------------  No significant changes to PMH, PSH, FHx, SHx, unless otherwise noted    ALLERGIES & MEDICATIONS  --------------------------------------------------------------------------------  Allergies    No Known Allergies    Intolerances      Standing Inpatient Medications  amLODIPine   Tablet 5 milliGRAM(s) Oral daily  atorvastatin 10 milliGRAM(s) Oral at bedtime  chlorhexidine 2% Cloths 1 Application(s) Topical daily  cyanocobalamin 1000 MICROGram(s) Oral daily  heparin   Injectable 5000 Unit(s) SubCutaneous every 12 hours  levothyroxine 100 MICROGram(s) Oral daily  pantoprazole    Tablet 40 milliGRAM(s) Oral two times a day    PRN Inpatient Medications  acetaminophen     Tablet .. 975 milliGRAM(s) Oral every 6 hours PRN  acetaminophen     Tablet .. 650 milliGRAM(s) Oral every 6 hours PRN  guaiFENesin Oral Liquid (Sugar-Free) 100 milliGRAM(s) Oral every 6 hours PRN  ondansetron Injectable 4 milliGRAM(s) IV Push every 6 hours PRN  polyethylene glycol 3350 17 Gram(s) Oral daily PRN  senna 2 Tablet(s) Oral at bedtime PRN      REVIEW OF SYSTEMS  --------------------------------------------------------------------------------  See HPI    VITALS/PHYSICAL EXAM  --------------------------------------------------------------------------------  T(C): 36.8 (06-17-23 @ 05:16), Max: 36.9 (06-16-23 @ 13:10)  HR: 80 (06-17-23 @ 05:16) (80 - 84)  BP: 151/62 (06-17-23 @ 05:16) (142/60 - 151/62)  RR: 15 (06-17-23 @ 05:16) (15 - 17)  SpO2: 95% (06-17-23 @ 05:16) (95% - 97%)  Wt(kg): --    06-16-23 @ 07:01  -  06-17-23 @ 07:00  --------------------------------------------------------  IN: 780 mL / OUT: 900 mL / NET: -120 mL    06-17-23 @ 07:01  -  06-17-23 @ 10:28  --------------------------------------------------------  IN: 240 mL / OUT: 200 mL / NET: 40 mL      Physical Exam:  Gen: elderly female, resting, NAD  HEENT: Anicteric  Pulm: CTA B/L  CV: S1S2+  Abd: Soft, +BS    Ext: Trace LE edema B/L  Neuro: Awake and alert  : +R nephrostomy tube with clear urine  Skin: Warm and dry  IV access: RIJ non-tunneled HD catheter    LABS/STUDIES  --------------------------------------------------------------------------------              9.0    8.83  >-----------<  331      [06-17-23 @ 07:00]              26.3     130  |  91  |  52  ----------------------------<  95      [06-17-23 @ 07:00]  3.6   |  21  |  13.53        Ca     8.4     [06-17-23 @ 07:00]      Mg     2.20     [06-17-23 @ 07:00]      Phos  6.8     [06-17-23 @ 07:00]    Creatinine Trend:  SCr 13.53 [06-17 @ 07:00]  SCr 12.75 [06-16 @ 06:41]  SCr 11.53 [06-15 @ 06:50]  SCr 10.29 [06-14 @ 06:40]  SCr 8.24 [06-13 @ 05:11]

## 2023-06-17 NOTE — PROGRESS NOTE ADULT - PROBLEM SELECTOR PLAN 1
Pt with advanced kidney failure and now with KINJAL in the setting of GI fluid losses (diarrhea), decreased PO intake, meds (Valsartan-HCTZ) and chronic NSAIDs use. Exact duration and etiology of kidney failure remains unknown. Pt presented with melanotic stool/diarrhea for 1 week prior to admission. SCr significantly elevated at 16.77 on ER labs. No prior labs available to review. Baca placed in ER with no urine return noted. Pt with hx of chronic NSAIDs use. Pt likely with ?advanced CKD. CT scan with obstructive uropathy seen - moderate right-sided hydroureteronephrosis secondary to an obstructing soft tissue mass present. Pt underwent R nephrostomy tube placement by IR on 6/8/23. Last HD treatment was on 6/12. Documented urine output is 900 cc (from R PCN) over the past 24 hours. Labs reviewed. Plan for HD treatment today. Will continue to evaluate daily for HD needs. IR note reviewed: plan for tunneled HD catheter placement on 6/19. Needs SW consult to establish with outpatient HD unit. Strict I/O. Avoid ACEi/ARBS/IV contrast/NSAIDs/Nephrotoxins. Dose meds to HD. Monitor labs.

## 2023-06-17 NOTE — DISCHARGE NOTE PROVIDER - NSDCFUADDINST_GEN_ALL_CORE_FT
Patient will be discharged with Right Nephrostomy tube. She will receive home nursing visit for daily flushes with 10cc normal saline. She will follow up with Interventional Radiology for nephrostomy replacement in 3 months.

## 2023-06-18 LAB
ANION GAP SERPL CALC-SCNC: 14 MMOL/L — SIGNIFICANT CHANGE UP (ref 7–14)
BLD GP AB SCN SERPL QL: NEGATIVE — SIGNIFICANT CHANGE UP
BUN SERPL-MCNC: 26 MG/DL — HIGH (ref 7–23)
CALCIUM SERPL-MCNC: 8.4 MG/DL — SIGNIFICANT CHANGE UP (ref 8.4–10.5)
CHLORIDE SERPL-SCNC: 96 MMOL/L — LOW (ref 98–107)
CO2 SERPL-SCNC: 25 MMOL/L — SIGNIFICANT CHANGE UP (ref 22–31)
CREAT SERPL-MCNC: 8.75 MG/DL — HIGH (ref 0.5–1.3)
EGFR: 4 ML/MIN/1.73M2 — LOW
GLUCOSE SERPL-MCNC: 91 MG/DL — SIGNIFICANT CHANGE UP (ref 70–99)
HCT VFR BLD CALC: 24.8 % — LOW (ref 34.5–45)
HGB BLD-MCNC: 8.3 G/DL — LOW (ref 11.5–15.5)
MAGNESIUM SERPL-MCNC: 1.9 MG/DL — SIGNIFICANT CHANGE UP (ref 1.6–2.6)
MCHC RBC-ENTMCNC: 30.4 PG — SIGNIFICANT CHANGE UP (ref 27–34)
MCHC RBC-ENTMCNC: 33.5 GM/DL — SIGNIFICANT CHANGE UP (ref 32–36)
MCV RBC AUTO: 90.8 FL — SIGNIFICANT CHANGE UP (ref 80–100)
NRBC # BLD: 0 /100 WBCS — SIGNIFICANT CHANGE UP (ref 0–0)
NRBC # FLD: 0 K/UL — SIGNIFICANT CHANGE UP (ref 0–0)
PHOSPHATE SERPL-MCNC: 5.4 MG/DL — HIGH (ref 2.5–4.5)
PLATELET # BLD AUTO: 312 K/UL — SIGNIFICANT CHANGE UP (ref 150–400)
POTASSIUM SERPL-MCNC: 3.3 MMOL/L — LOW (ref 3.5–5.3)
POTASSIUM SERPL-SCNC: 3.3 MMOL/L — LOW (ref 3.5–5.3)
RBC # BLD: 2.73 M/UL — LOW (ref 3.8–5.2)
RBC # FLD: 11.9 % — SIGNIFICANT CHANGE UP (ref 10.3–14.5)
RH IG SCN BLD-IMP: POSITIVE — SIGNIFICANT CHANGE UP
SODIUM SERPL-SCNC: 135 MMOL/L — SIGNIFICANT CHANGE UP (ref 135–145)
WBC # BLD: 8.09 K/UL — SIGNIFICANT CHANGE UP (ref 3.8–10.5)
WBC # FLD AUTO: 8.09 K/UL — SIGNIFICANT CHANGE UP (ref 3.8–10.5)

## 2023-06-18 RX ORDER — POTASSIUM CHLORIDE 20 MEQ
40 PACKET (EA) ORAL ONCE
Refills: 0 | Status: COMPLETED | OUTPATIENT
Start: 2023-06-18 | End: 2023-06-18

## 2023-06-18 RX ADMIN — HEPARIN SODIUM 5000 UNIT(S): 5000 INJECTION INTRAVENOUS; SUBCUTANEOUS at 17:32

## 2023-06-18 RX ADMIN — PANTOPRAZOLE SODIUM 40 MILLIGRAM(S): 20 TABLET, DELAYED RELEASE ORAL at 06:02

## 2023-06-18 RX ADMIN — ATORVASTATIN CALCIUM 10 MILLIGRAM(S): 80 TABLET, FILM COATED ORAL at 21:33

## 2023-06-18 RX ADMIN — PANTOPRAZOLE SODIUM 40 MILLIGRAM(S): 20 TABLET, DELAYED RELEASE ORAL at 17:32

## 2023-06-18 RX ADMIN — HEPARIN SODIUM 5000 UNIT(S): 5000 INJECTION INTRAVENOUS; SUBCUTANEOUS at 06:02

## 2023-06-18 RX ADMIN — CHLORHEXIDINE GLUCONATE 1 APPLICATION(S): 213 SOLUTION TOPICAL at 12:20

## 2023-06-18 RX ADMIN — Medication 100 MICROGRAM(S): at 06:02

## 2023-06-18 RX ADMIN — PREGABALIN 1000 MICROGRAM(S): 225 CAPSULE ORAL at 12:19

## 2023-06-18 RX ADMIN — Medication 40 MILLIEQUIVALENT(S): at 08:26

## 2023-06-18 RX ADMIN — AMLODIPINE BESYLATE 5 MILLIGRAM(S): 2.5 TABLET ORAL at 06:02

## 2023-06-18 NOTE — PROGRESS NOTE ADULT - SUBJECTIVE AND OBJECTIVE BOX
PROGRESS NOTE:   Authored by Bowen Noland, PGY-1     Patient is a 83y old  Female who presents with a chief complaint of Nausea, diarrhea, and renal failure (17 Jun 2023 14:11)      SUBJECTIVE / OVERNIGHT EVENTS:  NAEON. Pt denied fever, chill, chest pain, sob, n/v.     ADDITIONAL REVIEW OF SYSTEMS:    MEDICATIONS  (STANDING):  amLODIPine   Tablet 5 milliGRAM(s) Oral daily  atorvastatin 10 milliGRAM(s) Oral at bedtime  chlorhexidine 2% Cloths 1 Application(s) Topical daily  cyanocobalamin 1000 MICROGram(s) Oral daily  heparin   Injectable 5000 Unit(s) SubCutaneous every 12 hours  levothyroxine 100 MICROGram(s) Oral daily  pantoprazole    Tablet 40 milliGRAM(s) Oral two times a day    MEDICATIONS  (PRN):  acetaminophen     Tablet .. 975 milliGRAM(s) Oral every 6 hours PRN Severe Pain (7 - 10)  acetaminophen     Tablet .. 650 milliGRAM(s) Oral every 6 hours PRN Temp greater or equal to 38C (100.4F), Mild Pain (1 - 3), Moderate Pain (4 - 6)  guaiFENesin Oral Liquid (Sugar-Free) 100 milliGRAM(s) Oral every 6 hours PRN Cough  ondansetron Injectable 4 milliGRAM(s) IV Push every 6 hours PRN Nausea and/or Vomiting  polyethylene glycol 3350 17 Gram(s) Oral daily PRN Constipation  senna 2 Tablet(s) Oral at bedtime PRN Constipation      CAPILLARY BLOOD GLUCOSE      POCT Blood Glucose.: 100 mg/dL (17 Jun 2023 12:51)    I&O's Summary    16 Jun 2023 07:01  -  17 Jun 2023 07:00  --------------------------------------------------------  IN: 780 mL / OUT: 900 mL / NET: -120 mL    17 Jun 2023 07:01  -  18 Jun 2023 06:45  --------------------------------------------------------  IN: 1730 mL / OUT: 3100 mL / NET: -1370 mL        PHYSICAL EXAM:  Vital Signs Last 24 Hrs  T(C): 36.9 (17 Jun 2023 22:07), Max: 36.9 (17 Jun 2023 22:07)  T(F): 98.4 (17 Jun 2023 22:07), Max: 98.4 (17 Jun 2023 22:07)  HR: 75 (17 Jun 2023 22:07) (75 - 81)  BP: 145/53 (17 Jun 2023 22:07) (145/53 - 171/73)  BP(mean): --  RR: 17 (17 Jun 2023 22:07) (16 - 18)  SpO2: 94% (17 Jun 2023 22:07) (94% - 94%)    Parameters below as of 17 Jun 2023 22:07  Patient On (Oxygen Delivery Method): room air      General: Awake and alert.  No acute distress.  Head: Normocephalic, atraumatic.    Eyes: PERRL.  EOMI.  No scleral icterus.  No conjunctival pallor.  Mouth: Moist MM.  No oropharyngeal exudates.    Neck: Supple.  Full range of motion.  No JVD.  No LAD.  No thyromegaly.  Trachea midline.    Heart: RRR.  Normal S1 and S2.  No murmurs, rubs, or gallops.  No LE edema b/l.   Lungs: Nonlabored breathing.  Good inspiratory effort.  CTAB.  No wheezes, crackles, or rhonchi.    Abdomen: BS+, soft, nontender with no rebound or guarding, nondistended.  No hepatomegaly.   Genitourinary: Nephrostomy tube, recently emptied, with evidence of hematuria  Skin: Warm and dry.  No rashes.  Extremities: No cyanosis.  2+ peripheral pulses b/l.  Neuro: A&Ox3.  CNs grossly intact. No focal deficits.      LABS:                        9.0    8.83  )-----------( 331      ( 17 Jun 2023 07:00 )             26.3     06-17    130<L>  |  91<L>  |  52<H>  ----------------------------<  95  3.6   |  21<L>  |  13.53<H>    Ca    8.4      17 Jun 2023 07:00  Phos  6.8     06-17  Mg     2.20     06-17                  RADIOLOGY & ADDITIONAL TESTS:  Results Reviewed:   Imaging Personally Reviewed:  Electrocardiogram Personally Reviewed:    COORDINATION OF CARE:  Care Discussed with Consultants/Other Providers [Y/N]:  Prior or Outpatient Records Reviewed [Y/N]:

## 2023-06-18 NOTE — PROGRESS NOTE ADULT - ATTENDING COMMENTS
Chart reviewed. Vitals and Labs noted.   Pt seen and examined at bedside. Plan formulated with the resident. Agree with above progress note.    Appreciate PGY-1!  Spoke w/daughter @ bedside    Dx:   Acute renal failure, metabolic acidosis. improving.   Obstructive uropathy/ hydronephrosis due to obstructing soft tissue mass, with possible component of ATN/AIN given daily NSAID intake.   Renal and urology eval appreciated.  (GI symptoms likely renal related: neg GI PCR and neg c.diff)  s/p Shiley and right nephrostomy placement given CT finding of soft tissue mass, causing b/l hydronephrosis.  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 left kidney UOP steadily improved  repeat renal US stable 6/11/23   hold ARB/HCTZ. Cont Norvasc for BP control, BP improved with dialysis  Repeat renal U/S 6/15/23 unchanged  Cr worsened 6/16/23, HD to resume today (last HD was 6/12/23)  Tunneled HD catheter 6/19/23; NPO after MN  DVT ppx    - Dr. Fox Pena ([facility])  - [phone] .

## 2023-06-19 LAB
ANION GAP SERPL CALC-SCNC: 15 MMOL/L — HIGH (ref 7–14)
BLD GP AB SCN SERPL QL: NEGATIVE — SIGNIFICANT CHANGE UP
BUN SERPL-MCNC: 33 MG/DL — HIGH (ref 7–23)
CALCIUM SERPL-MCNC: 8.8 MG/DL — SIGNIFICANT CHANGE UP (ref 8.4–10.5)
CHLORIDE SERPL-SCNC: 96 MMOL/L — LOW (ref 98–107)
CO2 SERPL-SCNC: 23 MMOL/L — SIGNIFICANT CHANGE UP (ref 22–31)
CREAT SERPL-MCNC: 10.41 MG/DL — HIGH (ref 0.5–1.3)
EGFR: 3 ML/MIN/1.73M2 — LOW
GLUCOSE SERPL-MCNC: 94 MG/DL — SIGNIFICANT CHANGE UP (ref 70–99)
HCT VFR BLD CALC: 25.6 % — LOW (ref 34.5–45)
HGB BLD-MCNC: 8.5 G/DL — LOW (ref 11.5–15.5)
INR BLD: 1.12 RATIO — SIGNIFICANT CHANGE UP (ref 0.88–1.16)
MAGNESIUM SERPL-MCNC: 1.9 MG/DL — SIGNIFICANT CHANGE UP (ref 1.6–2.6)
MCHC RBC-ENTMCNC: 29.9 PG — SIGNIFICANT CHANGE UP (ref 27–34)
MCHC RBC-ENTMCNC: 33.2 GM/DL — SIGNIFICANT CHANGE UP (ref 32–36)
MCV RBC AUTO: 90.1 FL — SIGNIFICANT CHANGE UP (ref 80–100)
NRBC # BLD: 0 /100 WBCS — SIGNIFICANT CHANGE UP (ref 0–0)
NRBC # FLD: 0 K/UL — SIGNIFICANT CHANGE UP (ref 0–0)
PHOSPHATE SERPL-MCNC: 5.5 MG/DL — HIGH (ref 2.5–4.5)
PLATELET # BLD AUTO: 345 K/UL — SIGNIFICANT CHANGE UP (ref 150–400)
POTASSIUM SERPL-MCNC: 3.8 MMOL/L — SIGNIFICANT CHANGE UP (ref 3.5–5.3)
POTASSIUM SERPL-SCNC: 3.8 MMOL/L — SIGNIFICANT CHANGE UP (ref 3.5–5.3)
PROTHROM AB SERPL-ACNC: 13 SEC — SIGNIFICANT CHANGE UP (ref 10.5–13.4)
RBC # BLD: 2.84 M/UL — LOW (ref 3.8–5.2)
RBC # FLD: 12 % — SIGNIFICANT CHANGE UP (ref 10.3–14.5)
RH IG SCN BLD-IMP: POSITIVE — SIGNIFICANT CHANGE UP
SODIUM SERPL-SCNC: 134 MMOL/L — LOW (ref 135–145)
WBC # BLD: 8.72 K/UL — SIGNIFICANT CHANGE UP (ref 3.8–10.5)
WBC # FLD AUTO: 8.72 K/UL — SIGNIFICANT CHANGE UP (ref 3.8–10.5)

## 2023-06-19 PROCEDURE — 77001 FLUOROGUIDE FOR VEIN DEVICE: CPT | Mod: 26,GC

## 2023-06-19 PROCEDURE — 36558 INSERT TUNNELED CV CATH: CPT

## 2023-06-19 RX ORDER — SODIUM CHLORIDE 9 MG/ML
10 INJECTION INTRAMUSCULAR; INTRAVENOUS; SUBCUTANEOUS
Refills: 0 | Status: DISCONTINUED | OUTPATIENT
Start: 2023-06-19 | End: 2023-06-22

## 2023-06-19 RX ORDER — CHLORHEXIDINE GLUCONATE 213 G/1000ML
1 SOLUTION TOPICAL
Refills: 0 | Status: DISCONTINUED | OUTPATIENT
Start: 2023-06-19 | End: 2023-06-20

## 2023-06-19 RX ADMIN — HEPARIN SODIUM 5000 UNIT(S): 5000 INJECTION INTRAVENOUS; SUBCUTANEOUS at 18:26

## 2023-06-19 RX ADMIN — PANTOPRAZOLE SODIUM 40 MILLIGRAM(S): 20 TABLET, DELAYED RELEASE ORAL at 06:17

## 2023-06-19 RX ADMIN — PREGABALIN 1000 MICROGRAM(S): 225 CAPSULE ORAL at 13:02

## 2023-06-19 RX ADMIN — CHLORHEXIDINE GLUCONATE 1 APPLICATION(S): 213 SOLUTION TOPICAL at 13:02

## 2023-06-19 RX ADMIN — Medication 100 MICROGRAM(S): at 06:17

## 2023-06-19 RX ADMIN — AMLODIPINE BESYLATE 5 MILLIGRAM(S): 2.5 TABLET ORAL at 06:17

## 2023-06-19 RX ADMIN — ATORVASTATIN CALCIUM 10 MILLIGRAM(S): 80 TABLET, FILM COATED ORAL at 21:28

## 2023-06-19 RX ADMIN — PANTOPRAZOLE SODIUM 40 MILLIGRAM(S): 20 TABLET, DELAYED RELEASE ORAL at 18:25

## 2023-06-19 NOTE — PROGRESS NOTE ADULT - PROBLEM SELECTOR PLAN 5
BPs elevated to 170s-190s systolic on admission, likely in setting of severe renal failure. Pt on valsartan/HCTZ at home.  - Hold pt's home valsartan and HCTZ in setting of severe renal failure  - Monitor VS q8hrs  - can start hydralyzine PRN if sbp > 180 BPs elevated to 170s-190s systolic on admission, likely in setting of severe renal failure. Pt on valsartan/HCTZ at home.  -Amlodipine 5 mg daily

## 2023-06-19 NOTE — PROGRESS NOTE ADULT - PROBLEM SELECTOR PLAN 4
Hgb 10.3 initially, 10 on repeat. Likely multifactorial, including from possible GIB, anemia of chronic disease, and severe renal failure.  - Plan as above for GIB  - Check iron studies, folate, vitamin B12, retic count, LDH, and haptoglobin >> mixed picture of MARINE and anemia from chronic disease  - Pt reporting new ALLEN, suspect 2/2 anemia. Pt not grossly fluid overloaded on exam despite severe renal failure. Will check TTE to evaluate for structural heart failure. >> TTE grossly normal LVSF with EF 68% Hgb 10.3 initially, 10 on repeat. Likely multifactorial, including from possible GIB, anemia of chronic disease, and severe renal failure.  - Plan as above for GIB  - Check iron studies, folate, vitamin B12, retic count, LDH, and haptoglobin >> mixed picture of MARINE and anemia from chronic disease

## 2023-06-19 NOTE — PROCEDURE NOTE - PROCEDURE FINDINGS AND DETAILS
Successful placement of non-tunnelled dialysis catheter via right internal jugular vein.
technically successful fluoroscopic guided conversion of non tunneled to tunneled HD catheter.
Placement of right 8.5 Urdu percutaneous nephrostomy. Nephrostomy set to gravity bag drainage.

## 2023-06-19 NOTE — PROGRESS NOTE ADULT - PROBLEM SELECTOR PLAN 6
Pt on pravastatin at home.   - C/w atorvastatin 10 mg daily (therapeutic interchange) -Atorvastatin 10 mg daily (therapeutic interchange)

## 2023-06-19 NOTE — PROGRESS NOTE ADULT - PROBLEM SELECTOR PLAN 1
Serum Cr 16.77 and BUN 83 at presentation. Unknown baseline renal function, as no prior labs available. Concern for KINJAL on - CKD from obstructive uropathy, GI fluid losses (diarrhea), and poor PO intake.   - CTAP noncontrast obtained overnight and demonstrating moderate right-sided hydroureteronephrosis, with obstruction proximal to the UVJ in the region of a 3.6 x 2.9 x 4.6 cm bulbous soft tissue mass inseparable from the cervical remnant, mild left-sided hydroureteronephrosis to the level of the UVJ, and pelvic and RP lymphadenopathy concerning for metastatic disease  - Non-tunneled catheter placed 06/06, s/p 2nd HD.   - R nephrostomy tube placed 06/08  - S/p Baca catheter insertion in ED. C/w Baca catheter for now.  - US abdominal dopplers ordered to r/o renal artery stenosis >> negative  - UA, urine lytes, and urine protein/Cr ratio ordered, but pt currently with no urine output  - Check ALESIA, p-ANCA, c-ANCA, anti-GBM Ab, HBsAg, Hep C Ab, HIV, Parvovirus, C3, C4, SPEP, serum immunofixation, serum free light chains, and anti-PLA2R Ab  - dc sodium bicarb   - Monitor serum Cr and urine output with strict I&Os  - Avoid NSAIDs, ACEi/ARBs, contrast, and nephrotoxic agents   - Renally dose meds Serum Cr 16.77 and BUN 83 at presentation. Unknown baseline renal function, as no prior labs available. Concern for KINJAL on - CKD from obstructive uropathy, GI fluid losses (diarrhea), and poor PO intake.   - CTAP noncontrast obtained overnight and demonstrating moderate right-sided hydroureteronephrosis, with obstruction proximal to the UVJ in the region of a 3.6 x 2.9 x 4.6 cm bulbous soft tissue mass inseparable from the cervical remnant, mild left-sided hydroureteronephrosis to the level of the UVJ, and pelvic and RP lymphadenopathy concerning for metastatic disease  - Non-tunneled catheter placed 06/06, s/p 2nd HD. R nephrostomy tube placed 06/08  - Plan for long-term HD, tunneled cath placement on 6/19 w/ IR

## 2023-06-19 NOTE — PROGRESS NOTE ADULT - ASSESSMENT
84 yo woman with history of HTN, HLD, hypothyroidism, and high-grade squamous intraepithelial lesion s/p MILVIA/BSO (2008) presents with nausea and diarrhea, including with melena, for past ~1 week. Found to be in acute renal failure 2/2 an obstructing soft tissue mass inseparable from the cervical remnant and proximal to the UVJ, also with possible GIB. s/p Non-tunneled catheter for HD and R nephrostomy tube placement for R side obstructive uropathy from pelvic mass.  84 yo woman with history of HTN, HLD, hypothyroidism, and high-grade squamous intraepithelial lesion s/p MILVIA/BSO (2008) presents with nausea and diarrhea, including with melena, for past ~1 week. Found to be in acute renal failure 2/2 an obstructing soft tissue mass inseparable from the cervical remnant and proximal to the UVJ, also with possible GIB. s/p Non-tunneled catheter for HD and R nephrostomy tube placement for R side obstructive uropathy from pelvic mass, getting tunneled cath on 6/19.

## 2023-06-19 NOTE — PROGRESS NOTE ADULT - PROBLEM SELECTOR PLAN 8
- DVT ppx: Hold pharmacologic ppx for now given possible GIB. SCDs.   - Diet: DASH/TLC - DVT ppx: Hold pharmacologic ppx for now given possible GIB. SCDs.   - Diet: DASH/TLC  - Dispositon: Per PT, home w/o PT needs

## 2023-06-19 NOTE — PROGRESS NOTE ADULT - SUBJECTIVE AND OBJECTIVE BOX
***************************************************************  Mandomendez (Ji-Cheng) [facility] PGY1  Internal Medicine   ***************************************************************    BATSHEVA HOBBS  83y  MRN: [id number]    Patient is a 83y old  Female who presents with a chief complaint of Nausea, diarrhea, and renal failure (18 Jun 2023 06:45)      Subjective: no events ON. Denies fever, CP, SOB, abn pain, N/V, dysuria. Tolerating diet.      MEDICATIONS  (STANDING):  amLODIPine   Tablet 5 milliGRAM(s) Oral daily  atorvastatin 10 milliGRAM(s) Oral at bedtime  chlorhexidine 2% Cloths 1 Application(s) Topical daily  cyanocobalamin 1000 MICROGram(s) Oral daily  heparin   Injectable 5000 Unit(s) SubCutaneous every 12 hours  levothyroxine 100 MICROGram(s) Oral daily  pantoprazole    Tablet 40 milliGRAM(s) Oral two times a day    MEDICATIONS  (PRN):  acetaminophen     Tablet .. 975 milliGRAM(s) Oral every 6 hours PRN Severe Pain (7 - 10)  acetaminophen     Tablet .. 650 milliGRAM(s) Oral every 6 hours PRN Temp greater or equal to 38C (100.4F), Mild Pain (1 - 3), Moderate Pain (4 - 6)  guaiFENesin Oral Liquid (Sugar-Free) 100 milliGRAM(s) Oral every 6 hours PRN Cough  ondansetron Injectable 4 milliGRAM(s) IV Push every 6 hours PRN Nausea and/or Vomiting  polyethylene glycol 3350 17 Gram(s) Oral daily PRN Constipation  senna 2 Tablet(s) Oral at bedtime PRN Constipation      Objective:    Vitals: Vital Signs Last 24 Hrs  T(C): 36.6 (06-18-23 @ 22:32), Max: 36.7 (06-18-23 @ 06:03)  T(F): 97.9 (06-18-23 @ 22:32), Max: 98.1 (06-18-23 @ 06:03)  HR: 82 (06-18-23 @ 22:32) (81 - 84)  BP: 145/68 (06-18-23 @ 22:32) (140/54 - 145/68)  BP(mean): --  RR: 16 (06-18-23 @ 22:32) (16 - 16)  SpO2: 95% (06-18-23 @ 22:32) (95% - 97%)            I&O's Summary    17 Jun 2023 07:01  -  18 Jun 2023 07:00  --------------------------------------------------------  IN: 1970 mL / OUT: 3100 mL / NET: -1130 mL    18 Jun 2023 07:01  -  19 Jun 2023 04:47  --------------------------------------------------------  IN: 580 mL / OUT: 350 mL / NET: 230 mL        PHYSICAL EXAM:  GENERAL: NAD  HEAD:  Atraumatic, Normocephalic  EYES: EOMI, conjunctiva and sclera clear  CHEST/LUNG: Clear to auscultation bilaterally; No rales, rhonchi, wheezing, or rubs  HEART: Regular rate and rhythm; No murmurs, rubs, or gallops  ABDOMEN: Soft, Nontender, Nondistended;   SKIN: No rashes or lesions  NERVOUS SYSTEM:  Alert & Oriented X3, no focal deficits    LABS:  06-18    135  |  96<L>  |  26<H>  ----------------------------<  91  3.3<L>   |  25  |  8.75<H>  06-17    130<L>  |  91<L>  |  52<H>  ----------------------------<  95  3.6   |  21<L>  |  13.53<H>  06-16    133<L>  |  92<L>  |  47<H>  ----------------------------<  100<H>  4.0   |  23  |  12.75<H>    Ca    8.4      18 Jun 2023 05:07  Ca    8.4      17 Jun 2023 07:00  Ca    8.8      16 Jun 2023 06:41  Phos  5.4     06-18  Mg     1.90     06-18                                                8.3    8.09  )-----------( 312      ( 18 Jun 2023 05:07 )             24.8                         9.0    8.83  )-----------( 331      ( 17 Jun 2023 07:00 )             26.3                         9.5    13.69 )-----------( 389      ( 16 Jun 2023 06:41 )             28.0     CAPILLARY BLOOD GLUCOSE          RADIOLOGY & ADDITIONAL TESTS:    Imaging Personally Reviewed:  [x ] YES  [ ] NO    Consultants involved in case:   Consultant(s) Notes Reviewed:  [ x] YES  [ ] NO:   Care Discussed with Consultants/Other Providers [x ] YES  [ ] NO         ***************************************************************  Gilmore (Ji-Cheng) [facility] PGY1  Internal Medicine   ***************************************************************    BATSHEVA HOBBS  83y  MRN: [id number]    Patient is a 83y old  Female who presents with a chief complaint of Nausea, diarrhea, and renal failure (18 Jun 2023 06:45)      Subjective: NAEO. No lightheadedness, fatigue, SOB, leg edema, pain. No dark stools.     MEDICATIONS  (STANDING):  amLODIPine   Tablet 5 milliGRAM(s) Oral daily  atorvastatin 10 milliGRAM(s) Oral at bedtime  chlorhexidine 2% Cloths 1 Application(s) Topical daily  cyanocobalamin 1000 MICROGram(s) Oral daily  heparin   Injectable 5000 Unit(s) SubCutaneous every 12 hours  levothyroxine 100 MICROGram(s) Oral daily  pantoprazole    Tablet 40 milliGRAM(s) Oral two times a day    MEDICATIONS  (PRN):  acetaminophen     Tablet .. 975 milliGRAM(s) Oral every 6 hours PRN Severe Pain (7 - 10)  acetaminophen     Tablet .. 650 milliGRAM(s) Oral every 6 hours PRN Temp greater or equal to 38C (100.4F), Mild Pain (1 - 3), Moderate Pain (4 - 6)  guaiFENesin Oral Liquid (Sugar-Free) 100 milliGRAM(s) Oral every 6 hours PRN Cough  ondansetron Injectable 4 milliGRAM(s) IV Push every 6 hours PRN Nausea and/or Vomiting  polyethylene glycol 3350 17 Gram(s) Oral daily PRN Constipation  senna 2 Tablet(s) Oral at bedtime PRN Constipation      Objective:    Vitals: Vital Signs Last 24 Hrs  T(C): 36.6 (06-18-23 @ 22:32), Max: 36.7 (06-18-23 @ 06:03)  T(F): 97.9 (06-18-23 @ 22:32), Max: 98.1 (06-18-23 @ 06:03)  HR: 82 (06-18-23 @ 22:32) (81 - 84)  BP: 145/68 (06-18-23 @ 22:32) (140/54 - 145/68)  BP(mean): --  RR: 16 (06-18-23 @ 22:32) (16 - 16)  SpO2: 95% (06-18-23 @ 22:32) (95% - 97%)            I&O's Summary    17 Jun 2023 07:01  -  18 Jun 2023 07:00  --------------------------------------------------------  IN: 1970 mL / OUT: 3100 mL / NET: -1130 mL    18 Jun 2023 07:01  -  19 Jun 2023 04:47  --------------------------------------------------------  IN: 580 mL / OUT: 350 mL / NET: 230 mL        PHYSICAL EXAM:  GENERAL: NAD. [facility] central line in place.  HEAD:  Atraumatic, Normocephalic  EYES: EOMI, conjunctiva and sclera clear  CHEST/LUNG: Clear to auscultation bilaterally; No rales, rhonchi, wheezing, or rubs  HEART: Regular rate and rhythm; No murmurs, rubs, or gallops  ABDOMEN: Soft, Nontender, Nondistended;   SKIN: No rashes or lesions  NERVOUS SYSTEM:  Alert & Oriented X3, no focal deficits    LABS:  06-18    135  |  96<L>  |  26<H>  ----------------------------<  91  3.3<L>   |  25  |  8.75<H>  06-17    130<L>  |  91<L>  |  52<H>  ----------------------------<  95  3.6   |  21<L>  |  13.53<H>  06-16    133<L>  |  92<L>  |  47<H>  ----------------------------<  100<H>  4.0   |  23  |  12.75<H>    Ca    8.4      18 Jun 2023 05:07  Ca    8.4      17 Jun 2023 07:00  Ca    8.8      16 Jun 2023 06:41  Phos  5.4     06-18  Mg     1.90     06-18                                                8.3    8.09  )-----------( 312      ( 18 Jun 2023 05:07 )             24.8                         9.0    8.83  )-----------( 331      ( 17 Jun 2023 07:00 )             26.3                         9.5    13.69 )-----------( 389      ( 16 Jun 2023 06:41 )             28.0     CAPILLARY BLOOD GLUCOSE          RADIOLOGY & ADDITIONAL TESTS:    Imaging Personally Reviewed:  [x ] YES  [ ] NO    Consultants involved in case:   Consultant(s) Notes Reviewed:  [ x] YES  [ ] NO:   Care Discussed with Consultants/Other Providers [x ] YES  [ ] NO

## 2023-06-19 NOTE — CHART NOTE - NSCHARTNOTEFT_GEN_A_CORE
PRE-INTERVENTIONAL RADIOLOGY PROCEDURE NOTE  ============================  Bowen Noland, PGY1  Internal Medicine Resident  Pager: [phone] [facility]  ============================  Patient Name: BATSHEVA HOBBS    Patient Age: 83y    Patient Gender: Female    Procedure: Non-tunneled HD catheter placement    Diagnosis/Indication: Acute renal failure     Interventional Radiology Attending Physician: Dr Tony    Ordering Attending Physician: Dr Boone    Pertinent Medical History: 84 yo woman with history of HTN, HLD, hypothyroidism, and high-grade squamous intraepithelial lesion s/p MILVIA/BSO (2008) presents with nausea and diarrhea, including with melena, for past ~1 week. Found to be in acute renal failure 2/2 an obstructing soft tissue mass inseparable from the cervical remnant and proximal to the UVJ, also with possible GIB.      Pertinent labs:                      9.5    9.38  )-----------( 261      ( 07 Jun 2023 10:30 )             28.4       06-07    140  |  100  |  84<H>  ----------------------------<  102<H>  4.6   |  15<L>  |  16.51<H>    Ca    8.5      07 Jun 2023 10:30  Phos  9.5     06-07  Mg     2.40     06-07    TPro  6.6  /  Alb  3.7  /  TBili  <0.2  /  DBili  x   /  AST  14  /  ALT  5   /  AlkPhos  73  06-06      PT/INR - ( 07 Jun 2023 10:30 )   PT: 14.3 sec;   INR: 1.23 ratio                 Patient and Family Aware ? Yes
PRE-INTERVENTIONAL RADIOLOGY PROCEDURE NOTE  ============================  Bowen Noland, PGY1  Internal Medicine Resident  Pager: [phone] [facility]  ============================  Patient Name: BATSHEVA HOBBS    Patient Age: 83y    Patient Gender: Female    Procedure:  Percutaneous nephrostomy tube placement     Diagnosis/Indication: Acute renal failure 2/2 bilateral obstructive uropathy    Interventional Radiology Attending Physician: Dr. Tony     Ordering Attending Physician: Dr. Boone    Pertinent Medical History: 84 yo woman with history of HTN, HLD, hypothyroidism, and high-grade squamous intraepithelial lesion s/p MILVIA/BSO (2008) presents with nausea and diarrhea, including with melena, for past ~1 week. Found to be in acute renal failure 2/2 an obstructing soft tissue mass inseparable from the cervical remnant and proximal to the UVJ, also with possible GIB.      Pertinent labs:                      8.8    9.06  )-----------( 254      ( 07 Jun 2023 22:30 )             25.8       06-07    139  |  99  |  78<H>  ----------------------------<  103<H>  4.0   |  18<L>  |  16.30<H>    Ca    8.4      07 Jun 2023 22:30  Phos  8.5     06-07  Mg     2.20     06-07    TPro  6.6  /  Alb  3.7  /  TBili  <0.2  /  DBili  x   /  AST  14  /  ALT  5   /  AlkPhos  73  06-06      PT/INR - ( 07 Jun 2023 22:30 )   PT: 15.1 sec;   INR: 1.30 ratio                 Patient and Family Aware ? Yes
Called to bedside due to report of blood in PCN. Noted mayte blood in PCN bag, approximately 50 ccs. Patient's vitals were unremarkable, physical exam unremarkable, patient in no distress. will follow-up CBC in AM labs.     Kenneth Morales MD PGY-1
Interventional Radiology Pre-Procedure Note    82 yo woman with history of HTN, HLD, hypothyroidism, and high-grade squamous intraepithelial lesion s/p MILVIA/BSO (2008) presents with nausea and diarrhea, including with melena, for past ~1 week. Found to be in acute renal failure 2/2 an obstructing soft tissue mass inseparable from the cervical remnant and proximal to the UVJ, also with possible GIB. s/p Non-tunneled catheter for HD and R nephrostomy tube placement for R side obstructive uropathy from pelvic mass.    Now planned for tunneled HD catheter.    NPO: 6/18 23:59  Antibiotics: none  Anticoagulation: hold DVT PPx  Adverse events to anesethsia: none known  Objection to blood products: no    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      History of cholecystectomy      History of appendectomy      H/O: hysterectomy           Vitals:Vital Signs Last 24 Hrs  T(C): 36.9 (16 Jun 2023 13:10), Max: 36.9 (16 Jun 2023 06:00)  T(F): 98.4 (16 Jun 2023 13:10), Max: 98.5 (16 Jun 2023 06:00)  HR: 80 (16 Jun 2023 13:10) (80 - 84)  BP: 142/60 (16 Jun 2023 13:10) (140/66 - 149/63)  BP(mean): --  RR: 16 (16 Jun 2023 13:10) (15 - 18)  SpO2: 97% (16 Jun 2023 13:10) (95% - 98%)    Parameters below as of 16 Jun 2023 13:10  Patient On (Oxygen Delivery Method): room air        Allergies: Allergies    No Known Allergies    Intolerances        Physical Exam:     General: Awake and alert.  No acute distress.  Head: Normocephalic, atraumatic.    Eyes: PERRL.  EOMI.  No scleral icterus.  No conjunctival pallor.  Mouth: Moist MM.  No oropharyngeal exudates.    Neck: Supple.  Full range of motion.  No JVD.  No LAD.  No thyromegaly.  Trachea midline.    Heart: RRR.  Normal S1 and S2.  No murmurs, rubs, or gallops.  No LE edema b/l.   Lungs: Nonlabored breathing.  Good inspiratory effort.  CTAB.  No wheezes, crackles, or rhonchi.    Abdomen: BS+, soft, nontender with no rebound or guarding, nondistended.  No hepatomegaly.   Genitourinary: Nephrostomy tube, recently emptied, with evidence of hematuria  Skin: Warm and dry.  No rashes.  Extremities: No cyanosis.  2+ peripheral pulses b/l.  Neuro: A&Ox3.  CNs grossly intact. No focal deficits.    Labs:                         9.5    13.69 )-----------( 389      ( 16 Jun 2023 06:41 )             28.0     06-16    133<L>  |  92<L>  |  47<H>  ----------------------------<  100<H>  4.0   |  23  |  12.75<H>    Ca    8.8      16 Jun 2023 06:41  Phos  6.7     06-16  Mg     2.00     06-16          Informed consent obtained. All questions and concerns have been addressed at this time.
Interventional Radiology Pre-Procedure Note    84 yo woman with history of HTN, HLD, hypothyroidism, and high-grade squamous intraepithelial lesion s/p MILVIA/BSO (2008) presents with nausea and diarrhea, including with melena, for past ~1 week. Found to be in acute renal failure 2/2 an obstructing soft tissue mass inseparable from the cervical remnant and proximal to the UVJ, also with possible GIB. s/p Non-tunneled catheter for HD and R nephrostomy tube placement for R side obstructive uropathy from pelvic mass.    Now planned for tunneled HD catheter.    NPO: 6/18 23:59  Antibiotics: none  Anticoagulation: hold DVT PPx  Adverse events to anesethsia: none known  Objection to blood products: no    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      History of cholecystectomy      History of appendectomy      H/O: hysterectomy           Vitals:Vital Signs Last 24 Hrs  T(C): 36.9 (16 Jun 2023 13:10), Max: 36.9 (16 Jun 2023 06:00)  T(F): 98.4 (16 Jun 2023 13:10), Max: 98.5 (16 Jun 2023 06:00)  HR: 80 (16 Jun 2023 13:10) (80 - 84)  BP: 142/60 (16 Jun 2023 13:10) (140/66 - 149/63)  BP(mean): --  RR: 16 (16 Jun 2023 13:10) (15 - 18)  SpO2: 97% (16 Jun 2023 13:10) (95% - 98%)    Parameters below as of 16 Jun 2023 13:10  Patient On (Oxygen Delivery Method): room air        Allergies: Allergies    No Known Allergies    Intolerances        Physical Exam:     General: Awake and alert.  No acute distress.  Head: Normocephalic, atraumatic.    Eyes: PERRL.  EOMI.  No scleral icterus.  No conjunctival pallor.  Mouth: Moist MM.  No oropharyngeal exudates.    Neck: Supple.  Full range of motion.  No JVD.  No LAD.  No thyromegaly.  Trachea midline.    Heart: RRR.  Normal S1 and S2.  No murmurs, rubs, or gallops.  No LE edema b/l.   Lungs: Nonlabored breathing.  Good inspiratory effort.  CTAB.  No wheezes, crackles, or rhonchi.    Abdomen: BS+, soft, nontender with no rebound or guarding, nondistended.  No hepatomegaly.   Genitourinary: Nephrostomy tube, recently emptied, with evidence of hematuria  Skin: Warm and dry.  No rashes.  Extremities: No cyanosis.  2+ peripheral pulses b/l.  Neuro: A&Ox3.  CNs grossly intact. No focal deficits.    Labs:                         9.5    13.69 )-----------( 389      ( 16 Jun 2023 06:41 )             28.0     06-16    133<L>  |  92<L>  |  47<H>  ----------------------------<  100<H>  4.0   |  23  |  12.75<H>    Ca    8.8      16 Jun 2023 06:41  Phos  6.7     06-16  Mg     2.00     06-16          Informed consent obtained. All questions and concerns have been addressed at this time.
Pt evaluated for vaginal bleeding reported by primary team. Per provider, pt found to have a "small puddle" of blood on layla discovered during transfer to dialysis     Pt seen in dialysis center. Reports some nausea, which she has been experiencing for a few weeks, but otherwise denies any HA/dizziness/lightheadedness, CP/SOB, vomiting or diarrhea    PE:   ICU Vital Signs Last 24 Hrs  T(C): 36.7 (2023 22:10), Max: 36.9 (2023 21:25)  T(F): 98 (2023 22:10), Max: 98.4 (2023 21:25)  HR: 83 (2023 22:10) (79 - 97)  BP: 151/70 (2023 22:10) (119/89 - 188/65)  BP(mean): --  ABP: --  ABP(mean): --  RR: 18 (2023 22:10) (17 - 19)  SpO2: 96% (2023 21:25) (95% - 100%)    O2 Parameters below as of 2023 22:10  Patient On (Oxygen Delivery Method): room air        Gen: AOx3, NAD   Pelvic: vaginal packing stained fully with blood. ~5cc of blood on layla underneath    A/P:  82yo  w/ h/o HTN, HLD, & HSIL s/p MILVIA/BSO () presented to the ED w/ c/o nausea & black stools x 1 week. Gyn oncology consulted for findings of pelvic mass, lymphadenopathy, & BL hydronephrosis on imaging  - vaginal bleeding minimal, no c/f hemorrhage   - 10:30pm CBC reviewed, downtrending slightly  - no acute gyn intervention at the current time   - will re-eval in AM for removal of vaginal packing or PRN based on assessment of primary team     d/w Dr. Radha Bess, PGY-2
R4 Chart Note    Patient evaluated bedside. Currently no bleeding present at this time. Vaginal packing removed at this time with no bleeding present.     Strict pad counts to assess bleeding, if any.  - will attempt to expedite pathology of vaginal mass biopsy.    isaura Burk PGY4
R4 Chart Note    Seen patient bedside and discussed pathology findings. Patients imaging and pathology also reviewed at tumor board today.    Surgical Pathology Report:   ACCESSION No: [id number]   Patient: BATSHEVA HOBBS   Accession: [id number]   Collected Date/Time: 6/7/2023 17:50 EDT   Received Date/Time: 6/8/2023 17:34 EDT   Surgical Pathology Report - Auth (Verified)   Specimen(s) Submitted   1-Vaginal mass biopsy   Final Diagnosis   1. Vaginal mass biopsy   - Superficial fragments of squamous cell carcinoma      Recommendation: chemotherapy and palliative pelvic RT; follow up PDL1 testing; PET scan  - Email sent to followup outpatient with Heme/Onc and GynOnc ()  - Patient needs outpatient PET CT as well.    Efrain Burk PGY4

## 2023-06-19 NOTE — PROGRESS NOTE ADULT - ATTENDING COMMENTS
Chart reviewed. Vitals and Labs noted.   Pt seen and examined at bedside. Plan formulated with the resident. Agree with above progress note.    Appreciate PGY-1!  Spoke w/daughter @ bedside    Dx:   Acute renal failure, metabolic acidosis. improving.   Obstructive uropathy/ hydronephrosis due to obstructing soft tissue mass, with possible component of ATN/AIN given daily NSAID intake.   Renal and urology eval appreciated.  (GI symptoms likely renal related: neg GI PCR and neg c.diff)  s/p Shiley and right nephrostomy placement given CT finding of soft tissue mass, causing b/l hydronephrosis.  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 left kidney UOP steadily improved  repeat renal US stable 6/11/23   hold ARB/HCTZ. Cont Norvasc for BP control, BP improved with dialysis  Repeat renal U/S 6/15/23 unchanged  Cr worsened 6/16/23, HD to resume today (last HD was 6/12/23)  Tunneled HD catheter 6/19/23  DVT ppx    - Dr. Fox Pena ([facility])  - [phone] .

## 2023-06-19 NOTE — PRE PROCEDURE NOTE - PRE PROCEDURE EVALUATION
Vascular & Interventional Radiology Pre-Procedure Note    Procedure Name: non tunneled to tunneled HD catheter conversion    HPI: 83y Female with renal failure s/p non tunneled HD catheter insertion by IR 6/7 now for conversion.    Allergies: No Known Allergies      Medications:   amLODIPine   Tablet: 5 milliGRAM(s) Oral (06-19 @ 06:17)  heparin   Injectable: 5000 Unit(s) SubCutaneous (06-18 @ 17:32)      Data:  Vital Signs Last 24 Hrs  T(C): 36.6 (19 Jun 2023 14:02), Max: 36.6 (18 Jun 2023 22:32)  T(F): 97.8 (19 Jun 2023 14:02), Max: 97.9 (18 Jun 2023 22:32)  HR: 87 (19 Jun 2023 14:02) (82 - 88)  BP: 169/63 (19 Jun 2023 14:02) (145/68 - 169/63)  BP(mean): --  RR: 17 (19 Jun 2023 14:02) (16 - 17)  SpO2: 96% (19 Jun 2023 14:02) (95% - 96%)    Parameters below as of 19 Jun 2023 14:02  Patient On (Oxygen Delivery Method): room air        LABS:                        8.5    8.72  )-----------( 345      ( 19 Jun 2023 06:38 )             25.6     06-19    134<L>  |  96<L>  |  33<H>  ----------------------------<  94  3.8   |  23  |  10.41<H>    Ca    8.8      19 Jun 2023 06:38  Phos  5.5     06-19  Mg     1.90     06-19      PT/INR - ( 19 Jun 2023 06:38 )   PT: 13.0 sec;   INR: 1.12 ratio          Plan:   -83y Female presents for non tunneled to tunneled HD catheter conversion  -Risks/Benefits/alternatives explained with the patient and witnessed informed consent obtained. 
Vascular & Interventional Radiology Pre-Procedure Note    Procedure Name: Right Nephrostomy Placement    HPI: 83y Female with acute renal failure. CT 6/6 with Moderate right-sided hydroureteronephrosis secondary to an obstructing soft tissue mass inseparable from the cervical remnant. Bulky bilateral pelvic lymph nodes and left retroperitoneal lymph node suspicious for metastatic disease. Presents to IR for right percutaneous nephrostomy.    Allergies: No Known Allergies    Medications:  amLODIPine   Tablet: 5 milliGRAM(s) Oral (06-08 @ 07:11)  ampicillin/sulbactam  IVPB: 200 mL/Hr IV Intermittent (06-08 @ 11:40)    Data:  Vital Signs Last 24 Hrs  T(C): 37.3 (08 Jun 2023 16:18), Max: 37.3 (08 Jun 2023 16:18)  T(F): 99.1 (08 Jun 2023 16:18), Max: 99.1 (08 Jun 2023 16:18)  HR: 87 (08 Jun 2023 16:18) (76 - 87)  BP: 164/73 (08 Jun 2023 16:18) (140/51 - 179/77)  RR: 18 (08 Jun 2023 16:18) (17 - 19)  SpO2: 100% (08 Jun 2023 16:18) (94% - 100%)  Parameters below as of 08 Jun 2023 16:18  Patient On (Oxygen Delivery Method): room air    LABS:                      9.0    9.53  )-----------( 233      ( 08 Jun 2023 10:25 )             25.5     06-08    140  |  97<L>  |  60<H>  ----------------------------<  111<H>  3.7   |  23  |  14.02<H>    Ca    8.3<L>      08 Jun 2023 10:25  Phos  7.1     06-08  Mg     2.20     06-08  TPro  5.7<L>  /  Alb  3.3  /  TBili  0.2  /  DBili  x   /  AST  14  /  ALT  6   /  AlkPhos  61  06-08  PT/INR - ( 07 Jun 2023 22:30 )   PT: 15.1 sec;   INR: 1.30 ratio      Plan:   -83y Female presents for Right Nephrostomy Placement  -Risks/Benefits/alternatives explained with the patient and/or healthcare proxy and witnessed informed consent obtained.

## 2023-06-19 NOTE — CHART NOTE - NSCHARTNOTESELECT_GEN_ALL_CORE
Event Note
Gyn Onc Resident VB Eval
GynOnc Chart Note
GynOnc Chart Note
IR Pre-Procedure Note/Event Note
pre-procedure note/Event Note
IR pre-procedure note/Event Note
IR pre-procedure note/Event Note

## 2023-06-20 LAB
ANION GAP SERPL CALC-SCNC: 18 MMOL/L — HIGH (ref 7–14)
BUN SERPL-MCNC: 38 MG/DL — HIGH (ref 7–23)
CALCIUM SERPL-MCNC: 8.7 MG/DL — SIGNIFICANT CHANGE UP (ref 8.4–10.5)
CHLORIDE SERPL-SCNC: 94 MMOL/L — LOW (ref 98–107)
CO2 SERPL-SCNC: 22 MMOL/L — SIGNIFICANT CHANGE UP (ref 22–31)
CREAT SERPL-MCNC: 11.74 MG/DL — HIGH (ref 0.5–1.3)
EGFR: 3 ML/MIN/1.73M2 — LOW
GLUCOSE SERPL-MCNC: 89 MG/DL — SIGNIFICANT CHANGE UP (ref 70–99)
HCT VFR BLD CALC: 25.6 % — LOW (ref 34.5–45)
HGB BLD-MCNC: 8.4 G/DL — LOW (ref 11.5–15.5)
MAGNESIUM SERPL-MCNC: 1.9 MG/DL — SIGNIFICANT CHANGE UP (ref 1.6–2.6)
MCHC RBC-ENTMCNC: 30.3 PG — SIGNIFICANT CHANGE UP (ref 27–34)
MCHC RBC-ENTMCNC: 32.8 GM/DL — SIGNIFICANT CHANGE UP (ref 32–36)
MCV RBC AUTO: 92.4 FL — SIGNIFICANT CHANGE UP (ref 80–100)
NRBC # BLD: 0 /100 WBCS — SIGNIFICANT CHANGE UP (ref 0–0)
NRBC # FLD: 0 K/UL — SIGNIFICANT CHANGE UP (ref 0–0)
PHOSPHATE SERPL-MCNC: 6.3 MG/DL — HIGH (ref 2.5–4.5)
PLATELET # BLD AUTO: 312 K/UL — SIGNIFICANT CHANGE UP (ref 150–400)
POTASSIUM SERPL-MCNC: 3.9 MMOL/L — SIGNIFICANT CHANGE UP (ref 3.5–5.3)
POTASSIUM SERPL-SCNC: 3.9 MMOL/L — SIGNIFICANT CHANGE UP (ref 3.5–5.3)
RBC # BLD: 2.77 M/UL — LOW (ref 3.8–5.2)
RBC # FLD: 12 % — SIGNIFICANT CHANGE UP (ref 10.3–14.5)
SARS-COV-2 RNA SPEC QL NAA+PROBE: SIGNIFICANT CHANGE UP
SODIUM SERPL-SCNC: 134 MMOL/L — LOW (ref 135–145)
WBC # BLD: 9.11 K/UL — SIGNIFICANT CHANGE UP (ref 3.8–10.5)
WBC # FLD AUTO: 9.11 K/UL — SIGNIFICANT CHANGE UP (ref 3.8–10.5)

## 2023-06-20 PROCEDURE — 71045 X-RAY EXAM CHEST 1 VIEW: CPT | Mod: 26

## 2023-06-20 PROCEDURE — 93010 ELECTROCARDIOGRAM REPORT: CPT

## 2023-06-20 PROCEDURE — 90935 HEMODIALYSIS ONE EVALUATION: CPT

## 2023-06-20 RX ORDER — CHLORHEXIDINE GLUCONATE 213 G/1000ML
1 SOLUTION TOPICAL
Refills: 0 | Status: DISCONTINUED | OUTPATIENT
Start: 2023-06-20 | End: 2023-06-22

## 2023-06-20 RX ORDER — SODIUM CHLORIDE 9 MG/ML
100 INJECTION INTRAMUSCULAR; INTRAVENOUS; SUBCUTANEOUS
Refills: 0 | Status: DISCONTINUED | OUTPATIENT
Start: 2023-06-20 | End: 2023-06-22

## 2023-06-20 RX ORDER — ERYTHROPOIETIN 10000 [IU]/ML
4000 INJECTION, SOLUTION INTRAVENOUS; SUBCUTANEOUS
Refills: 0 | Status: DISCONTINUED | OUTPATIENT
Start: 2023-06-20 | End: 2023-06-22

## 2023-06-20 RX ADMIN — PANTOPRAZOLE SODIUM 40 MILLIGRAM(S): 20 TABLET, DELAYED RELEASE ORAL at 19:25

## 2023-06-20 RX ADMIN — PREGABALIN 1000 MICROGRAM(S): 225 CAPSULE ORAL at 12:15

## 2023-06-20 RX ADMIN — Medication 100 MICROGRAM(S): at 05:32

## 2023-06-20 RX ADMIN — PANTOPRAZOLE SODIUM 40 MILLIGRAM(S): 20 TABLET, DELAYED RELEASE ORAL at 05:32

## 2023-06-20 RX ADMIN — HEPARIN SODIUM 5000 UNIT(S): 5000 INJECTION INTRAVENOUS; SUBCUTANEOUS at 19:21

## 2023-06-20 RX ADMIN — HEPARIN SODIUM 5000 UNIT(S): 5000 INJECTION INTRAVENOUS; SUBCUTANEOUS at 05:32

## 2023-06-20 RX ADMIN — ATORVASTATIN CALCIUM 10 MILLIGRAM(S): 80 TABLET, FILM COATED ORAL at 21:13

## 2023-06-20 RX ADMIN — CHLORHEXIDINE GLUCONATE 1 APPLICATION(S): 213 SOLUTION TOPICAL at 12:15

## 2023-06-20 RX ADMIN — CHLORHEXIDINE GLUCONATE 1 APPLICATION(S): 213 SOLUTION TOPICAL at 05:32

## 2023-06-20 NOTE — PROGRESS NOTE ADULT - SUBJECTIVE AND OBJECTIVE BOX
***************************************************************  [facility] (Ji-Cheng) [facility] PGY1  Internal Medicine   ***************************************************************    BATSHEVA HOBBS  83y  MRN: [id number]    Patient is a 83y old  Female who presents with a chief complaint of Nausea, diarrhea, and renal failure (19 Jun 2023 04:47)      Subjective: no events ON. Denies fever, CP, SOB, abn pain, N/V, dysuria. Tolerating diet.      MEDICATIONS  (STANDING):  amLODIPine   Tablet 5 milliGRAM(s) Oral daily  atorvastatin 10 milliGRAM(s) Oral at bedtime  chlorhexidine 2% Cloths 1 Application(s) Topical daily  chlorhexidine 2% Cloths 1 Application(s) Topical <User Schedule>  cyanocobalamin 1000 MICROGram(s) Oral daily  heparin   Injectable 5000 Unit(s) SubCutaneous every 12 hours  levothyroxine 100 MICROGram(s) Oral daily  pantoprazole    Tablet 40 milliGRAM(s) Oral two times a day    MEDICATIONS  (PRN):  acetaminophen     Tablet .. 650 milliGRAM(s) Oral every 6 hours PRN Temp greater or equal to 38C (100.4F), Mild Pain (1 - 3), Moderate Pain (4 - 6)  acetaminophen     Tablet .. 975 milliGRAM(s) Oral every 6 hours PRN Severe Pain (7 - 10)  guaiFENesin Oral Liquid (Sugar-Free) 100 milliGRAM(s) Oral every 6 hours PRN Cough  ondansetron Injectable 4 milliGRAM(s) IV Push every 6 hours PRN Nausea and/or Vomiting  polyethylene glycol 3350 17 Gram(s) Oral daily PRN Constipation  senna 2 Tablet(s) Oral at bedtime PRN Constipation  sodium chloride 0.9% lock flush 10 milliLiter(s) IV Push every 1 hour PRN Pre/post blood products, medications, blood draw, and to maintain line patency      Objective:    Vitals: Vital Signs Last 24 Hrs  T(C): 37 (06-20-23 @ 06:20), Max: 37 (06-20-23 @ 06:20)  T(F): 98.6 (06-20-23 @ 06:20), Max: 98.6 (06-20-23 @ 06:20)  HR: 91 (06-20-23 @ 06:20) (80 - 93)  BP: 144/67 (06-20-23 @ 06:20) (144/67 - 169/70)  BP(mean): --  RR: 17 (06-20-23 @ 06:20) (16 - 17)  SpO2: 96% (06-20-23 @ 05:27) (96% - 98%)            I&O's Summary    19 Jun 2023 07:01  -  20 Jun 2023 07:00  --------------------------------------------------------  IN: 550 mL / OUT: 500 mL / NET: 50 mL        PHYSICAL EXAM:  GENERAL: NAD  HEAD:  Atraumatic, Normocephalic  EYES: EOMI, conjunctiva and sclera clear  CHEST/LUNG: Clear to auscultation bilaterally; No rales, rhonchi, wheezing, or rubs  HEART: Regular rate and rhythm; No murmurs, rubs, or gallops  ABDOMEN: Soft, Nontender, Nondistended;   SKIN: No rashes or lesions  NERVOUS SYSTEM:  Alert & Oriented X3, no focal deficits    LABS:  06-20    134<L>  |  94<L>  |  38<H>  ----------------------------<  89  3.9   |  22  |  11.74<H>  06-19    134<L>  |  96<L>  |  33<H>  ----------------------------<  94  3.8   |  23  |  10.41<H>  06-18    135  |  96<L>  |  26<H>  ----------------------------<  91  3.3<L>   |  25  |  8.75<H>    Ca    8.7      20 Jun 2023 06:48  Ca    8.8      19 Jun 2023 06:38  Ca    8.4      18 Jun 2023 05:07  Phos  6.3     06-20  Mg     1.90     06-20        PT/INR - ( 19 Jun 2023 06:38 )   PT: 13.0 sec;   INR: 1.12 ratio                                                 8.4    9.11  )-----------( 312      ( 20 Jun 2023 06:48 )             25.6                         8.5    8.72  )-----------( 345      ( 19 Jun 2023 06:38 )             25.6                         8.3    8.09  )-----------( 312      ( 18 Jun 2023 05:07 )             24.8     CAPILLARY BLOOD GLUCOSE          RADIOLOGY & ADDITIONAL TESTS:    Imaging Personally Reviewed:  [x ] YES  [ ] NO    Consultants involved in case:   Consultant(s) Notes Reviewed:  [ x] YES  [ ] NO:   Care Discussed with Consultants/Other Providers [x ] YES  [ ] NO         ***************************************************************  [facility] ([facility]) [facility] PGY1  Internal Medicine   ***************************************************************    BATSHEVA HOBBS  83y  MRN: [id number]    Patient is a 83y old  Female who presents with a chief complaint of Nausea, diarrhea, and renal failure (19 Jun 2023 04:47)      Subjective: NAEO. Getting HD today.     MEDICATIONS  (STANDING):  amLODIPine   Tablet 5 milliGRAM(s) Oral daily  atorvastatin 10 milliGRAM(s) Oral at bedtime  chlorhexidine 2% Cloths 1 Application(s) Topical daily  chlorhexidine 2% Cloths 1 Application(s) Topical <User Schedule>  cyanocobalamin 1000 MICROGram(s) Oral daily  heparin   Injectable 5000 Unit(s) SubCutaneous every 12 hours  levothyroxine 100 MICROGram(s) Oral daily  pantoprazole    Tablet 40 milliGRAM(s) Oral two times a day    MEDICATIONS  (PRN):  acetaminophen     Tablet .. 650 milliGRAM(s) Oral every 6 hours PRN Temp greater or equal to 38C (100.4F), Mild Pain (1 - 3), Moderate Pain (4 - 6)  acetaminophen     Tablet .. 975 milliGRAM(s) Oral every 6 hours PRN Severe Pain (7 - 10)  guaiFENesin Oral Liquid (Sugar-Free) 100 milliGRAM(s) Oral every 6 hours PRN Cough  ondansetron Injectable 4 milliGRAM(s) IV Push every 6 hours PRN Nausea and/or Vomiting  polyethylene glycol 3350 17 Gram(s) Oral daily PRN Constipation  senna 2 Tablet(s) Oral at bedtime PRN Constipation  sodium chloride 0.9% lock flush 10 milliLiter(s) IV Push every 1 hour PRN Pre/post blood products, medications, blood draw, and to maintain line patency      Objective:    Vitals: Vital Signs Last 24 Hrs  T(C): 37 (06-20-23 @ 06:20), Max: 37 (06-20-23 @ 06:20)  T(F): 98.6 (06-20-23 @ 06:20), Max: 98.6 (06-20-23 @ 06:20)  HR: 91 (06-20-23 @ 06:20) (80 - 93)  BP: 144/67 (06-20-23 @ 06:20) (144/67 - 169/70)  BP(mean): --  RR: 17 (06-20-23 @ 06:20) (16 - 17)  SpO2: 96% (06-20-23 @ 05:27) (96% - 98%)            I&O's Summary    19 Jun 2023 07:01  -  20 Jun 2023 07:00  --------------------------------------------------------  IN: 550 mL / OUT: 500 mL / NET: 50 mL        PHYSICAL EXAM:  GENERAL: NAD  HEAD:  Atraumatic, Normocephalic  EYES: EOMI, conjunctiva and sclera clear  CHEST/LUNG: Clear to auscultation bilaterally; No rales, rhonchi, wheezing, or rubs  HEART: Regular rate and rhythm; No murmurs, rubs, or gallops  ABDOMEN: Soft, Nontender, Nondistended;   SKIN: No rashes or lesions  NERVOUS SYSTEM:  Alert & Oriented X3, no focal deficits    LABS:  06-20    134<L>  |  94<L>  |  38<H>  ----------------------------<  89  3.9   |  22  |  11.74<H>  06-19    134<L>  |  96<L>  |  33<H>  ----------------------------<  94  3.8   |  23  |  10.41<H>  06-18    135  |  96<L>  |  26<H>  ----------------------------<  91  3.3<L>   |  25  |  8.75<H>    Ca    8.7      20 Jun 2023 06:48  Ca    8.8      19 Jun 2023 06:38  Ca    8.4      18 Jun 2023 05:07  Phos  6.3     06-20  Mg     1.90     06-20        PT/INR - ( 19 Jun 2023 06:38 )   PT: 13.0 sec;   INR: 1.12 ratio                                                 8.4    9.11  )-----------( 312      ( 20 Jun 2023 06:48 )             25.6                         8.5    8.72  )-----------( 345      ( 19 Jun 2023 06:38 )             25.6                         8.3    8.09  )-----------( 312      ( 18 Jun 2023 05:07 )             24.8     CAPILLARY BLOOD GLUCOSE          RADIOLOGY & ADDITIONAL TESTS:    Imaging Personally Reviewed:  [x ] YES  [ ] NO    Consultants involved in case:   Consultant(s) Notes Reviewed:  [ x] YES  [ ] NO:   Care Discussed with Consultants/Other Providers [x ] YES  [ ] NO

## 2023-06-20 NOTE — PROGRESS NOTE ADULT - SUBJECTIVE AND OBJECTIVE BOX
[facility] DIVISION OF KIDNEY DISEASES AND HYPERTENSION   FOLLOW UP NOTE  --------------------------------------------------------------------------------  Chief Complaint: KINJAL due to obstructive uropathy, requiring RRT, now s/p R nephrostomy tube placement, monitoring for renal recovery     24 hour events/subjective: Pt. underwent R nephrostomy tube placement on 6/8. Last HD treatment was on 6/17. Tunneled HD catheter placed on 6/19. Pt. was seen and evaluated at bedside this morning during HD rounds, appeared to be tolerating HD. She continues to feel well, endorses no complaints. She continues to have output from her R PCN and states she is urinating. Scr remains elevated. Denies any headaches, fevers/chills, chest pain, SOB, and LE edema.    PAST HISTORY  --------------------------------------------------------------------------------  No significant changes to PMH, PSH, FHx, SHx, unless otherwise noted    ALLERGIES & MEDICATIONS  --------------------------------------------------------------------------------  Allergies  No Known Allergies  Intolerances    Standing Inpatient Medications  amLODIPine   Tablet 5 milliGRAM(s) Oral daily  atorvastatin 10 milliGRAM(s) Oral at bedtime  chlorhexidine 2% Cloths 1 Application(s) Topical daily  chlorhexidine 2% Cloths 1 Application(s) Topical <User Schedule>  cyanocobalamin 1000 MICROGram(s) Oral daily  epoetin ruth-epbx (RETACRIT) Injectable 4000 Unit(s) IV Push <User Schedule>  heparin   Injectable 5000 Unit(s) SubCutaneous every 12 hours  levothyroxine 100 MICROGram(s) Oral daily  pantoprazole    Tablet 40 milliGRAM(s) Oral two times a day    PRN Inpatient Medications  acetaminophen     Tablet .. 650 milliGRAM(s) Oral every 6 hours PRN  acetaminophen     Tablet .. 975 milliGRAM(s) Oral every 6 hours PRN  guaiFENesin Oral Liquid (Sugar-Free) 100 milliGRAM(s) Oral every 6 hours PRN  ondansetron Injectable 4 milliGRAM(s) IV Push every 6 hours PRN  polyethylene glycol 3350 17 Gram(s) Oral daily PRN  senna 2 Tablet(s) Oral at bedtime PRN  sodium chloride 0.9% Bolus. 100 milliLiter(s) IV Bolus every 5 minutes PRN  sodium chloride 0.9% lock flush 10 milliLiter(s) IV Push every 1 hour PRN      REVIEW OF SYSTEMS  --------------------------------------------------------------------------------  All other systems were reviewed and are negative, except as noted.    VITALS/PHYSICAL EXAM  --------------------------------------------------------------------------------  T(C): 37 (06-20-23 @ 06:20), Max: 37 (06-20-23 @ 06:20)  HR: 91 (06-20-23 @ 06:20) (80 - 93)  BP: 144/67 (06-20-23 @ 06:20) (144/67 - 169/70)  RR: 17 (06-20-23 @ 06:20) (16 - 17)  SpO2: 96% (06-20-23 @ 05:27) (96% - 98%)  Wt(kg): --    06-19-23 @ 07:01  -  06-20-23 @ 07:00  --------------------------------------------------------  IN: 750 mL / OUT: 500 mL / NET: 250 mL    06-20-23 @ 07:01  -  06-20-23 @ 09:26  --------------------------------------------------------  IN: 600 mL / OUT: 2100 mL / NET: -1500 mL    Physical Exam:  Gen: elderly female, resting, NAD, undergoing HD  HEENT: Anicteric  Pulm: CTA B/L  CV: S1S2+  Abd: Soft, +BS    Ext: Trace LE edema B/L  Neuro: Awake and alert  : +R nephrostomy tube with clear urine  Skin: Warm and dry  IV access: RIJ tunneled HD catheter being used for HD    LABS/STUDIES  --------------------------------------------------------------------------------              8.4    9.11  >-----------<  312      [06-20-23 @ 06:48]              25.6     134  |  94  |  38  ----------------------------<  89      [06-20-23 @ 06:48]  3.9   |  22  |  11.74        Ca     8.7     [06-20-23 @ 06:48]      Mg     1.90     [06-20-23 @ 06:48]      Phos  6.3     [06-20-23 @ 06:48]      PT/INR: PT 13.0 , INR 1.12       [06-19-23 @ 06:38]    Creatinine Trend:  SCr 11.74 [06-20 @ 06:48]  SCr 10.41 [06-19 @ 06:38]  SCr 8.75 [06-18 @ 05:07]  SCr 13.53 [06-17 @ 07:00]  SCr 12.75 [06-16 @ 06:41]

## 2023-06-20 NOTE — PROGRESS NOTE ADULT - ASSESSMENT
82 yo woman with history of HTN, HLD, hypothyroidism, and high-grade squamous intraepithelial lesion s/p MILVIA/BSO (2008) presents with nausea and diarrhea, including with melena, for past ~1 week. Found to be in acute renal failure 2/2 an obstructing soft tissue mass inseparable from the cervical remnant and proximal to the UVJ, also with possible GIB. s/p Non-tunneled catheter for HD and R nephrostomy tube placement for R side obstructive uropathy from pelvic mass, getting tunneled cath on 6/19.

## 2023-06-20 NOTE — PROGRESS NOTE ADULT - PROBLEM SELECTOR PLAN 1
Serum Cr 16.77 and BUN 83 at presentation. Unknown baseline renal function, as no prior labs available. Concern for KINJAL on - CKD from obstructive uropathy, GI fluid losses (diarrhea), and poor PO intake.   - CTAP noncontrast obtained overnight and demonstrating moderate right-sided hydroureteronephrosis, with obstruction proximal to the UVJ in the region of a 3.6 x 2.9 x 4.6 cm bulbous soft tissue mass inseparable from the cervical remnant, mild left-sided hydroureteronephrosis to the level of the UVJ, and pelvic and RP lymphadenopathy concerning for metastatic disease  - Non-tunneled catheter placed 06/06, s/p 2nd HD. R nephrostomy tube placed 06/08  - Plan for long-term HD, tunneled cath placement on 6/19 w/ IR Serum Cr 16.77 and BUN 83 at presentation. Unknown baseline renal function, as no prior labs available. Concern for KINJAL on - CKD from obstructive uropathy, GI fluid losses (diarrhea), and poor PO intake.   - CTAP noncontrast obtained overnight and demonstrating moderate right-sided hydroureteronephrosis, with obstruction proximal to the UVJ in the region of a 3.6 x 2.9 x 4.6 cm bulbous soft tissue mass inseparable from the cervical remnant, mild left-sided hydroureteronephrosis to the level of the UVJ, and pelvic and RP lymphadenopathy concerning for metastatic disease  - Non-tunneled catheter placed 06/06, s/p 2nd HD. R nephrostomy tube placed 06/08  - Plan for long-term HD, tunneled cath placement on 6/19 w/ IR  - Arranging w/ SW/CM regarding home HD.

## 2023-06-20 NOTE — PROGRESS NOTE ADULT - ATTENDING COMMENTS
seen and evaluated on dialysis this morning.  KINJAL at this point not ESKD at this time but requires outpatient HD planning.

## 2023-06-20 NOTE — PROGRESS NOTE ADULT - SUBJECTIVE AND OBJECTIVE BOX
ANESTHESIA POSTOP CHECK    83y Female POSTOP DAY 1 S/P     Vital Signs Last 24 Hrs  T(C): 36.8 (20 Jun 2023 09:40), Max: 37 (20 Jun 2023 06:20)  T(F): 98.2 (20 Jun 2023 09:40), Max: 98.6 (20 Jun 2023 06:20)  HR: 92 (20 Jun 2023 09:40) (80 - 93)  BP: 138/50 (20 Jun 2023 09:40) (138/50 - 169/70)  BP(mean): --  RR: 17 (20 Jun 2023 09:40) (16 - 17)  SpO2: 96% (20 Jun 2023 05:27) (96% - 98%)    Parameters below as of 20 Jun 2023 09:40  Patient On (Oxygen Delivery Method): room air      I&O's Summary    19 Jun 2023 07:01  -  20 Jun 2023 07:00  --------------------------------------------------------  IN: 750 mL / OUT: 500 mL / NET: 250 mL    20 Jun 2023 07:01  -  20 Jun 2023 12:29  --------------------------------------------------------  IN: 600 mL / OUT: 2100 mL / NET: -1500 mL        [ x] NO APPARENT ANESTHESIA COMPLICATIONS

## 2023-06-20 NOTE — PROVIDER CONTACT NOTE (OTHER) - BACKGROUND
84 y/o F admitted with acute renal failure. PMH of htn, hld, and high grade squamous intraepithelial lesion of cervix.
84 y/o F admitted with acute renal failure. PMH of htn, hld, and high grade squamous intraepithelial lesion of cervix.
Pt was admitted with diarrhea
Pt admitted for acute renal failure.
Pt was admitted with Acute renal failure
84 y/o F admitted with acute renal failure
Pt admitted with acute renal failure.
Pt was admitted with diarrhea
Pt admitted for acute renal failure, has Hx of HTN, hypothyroidism, and hysterectomy.

## 2023-06-20 NOTE — PROGRESS NOTE ADULT - PROBLEM SELECTOR PLAN 4
Hgb 10.3 initially, 10 on repeat. Likely multifactorial, including from possible GIB, anemia of chronic disease, and severe renal failure.  - Plan as above for GIB  - Check iron studies, folate, vitamin B12, retic count, LDH, and haptoglobin >> mixed picture of MARINE and anemia from chronic disease

## 2023-06-20 NOTE — PROGRESS NOTE ADULT - PROBLEM SELECTOR PLAN 1
Pt with advanced kidney failure and now with KINJAL in the setting of GI fluid losses (diarrhea), decreased PO intake, meds (Valsartan-HCTZ) and chronic NSAIDs use. Exact duration and etiology of kidney failure remains unknown. Pt presented with melanotic stool/diarrhea for 1 week prior to admission. SCr significantly elevated at 16.77 on ER labs. No prior labs available to review. Baca placed in ER with no urine return noted. Pt with hx of chronic NSAIDs use. Pt likely with ?advanced CKD. CT scan with obstructive uropathy seen - moderate right-sided hydroureteronephrosis secondary to an obstructing soft tissue mass present. Pt underwent R nephrostomy tube placement by IR on 6/8/23. Last HD treatment was on 6/17. R IJ tunneled HD catheter placed on 6/19. Documented urine output is 500 cc (from R PCN) over the past 24 hours. Labs reviewed. Undergoing HD today. Will continue to evaluate daily for HD needs. Needs SW consult to establish with outpatient HD unit. Strict I/O. Avoid ACEi/ARBS/IV contrast/NSAIDs/Nephrotoxins. Dose meds to HD. Monitor labs.

## 2023-06-20 NOTE — PROGRESS NOTE ADULT - ATTENDING COMMENTS
Chart reviewed. Vitals and Labs noted.   Pt seen and examined at bedside. Plan formulated with the resident. Agree with above progress note.    Appreciate PGY-1!  Spoke w/daughter @ bedside    Dx:   Acute renal failure, metabolic acidosis. improving.   Obstructive uropathy/ hydronephrosis due to obstructing soft tissue mass, with possible component of ATN/AIN given daily NSAID intake.   Renal and urology eval appreciated.  (GI symptoms likely renal related: neg GI PCR and neg c.diff)  s/p Shiley and right nephrostomy placement given CT finding of soft tissue mass, causing b/l hydronephrosis.  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 left kidney UOP steadily improved  repeat renal US stable 6/11/23   hold ARB/HCTZ upon discharge. Cont Norvasc for BP control, BP improved with dialysis  Repeat renal U/S 6/15/23 unchanged  Cr worsened 6/16/23, HD to resume today (last HD was 6/12/23)  Tunneled HD catheter placed 6/19/23  DVT ppx  D/c planning underway    - Dr. Fox Pena ([facility])  - [phone] .

## 2023-06-20 NOTE — PROVIDER CONTACT NOTE (OTHER) - NAME OF MD/NP/PA/DO NOTIFIED:
Kenneth Morales MD
Dr. Noland Bowen
huseyin Noland MD
Dr Morales aware
MD Kenneth Morales
Kenneth Morales
will Morales
Dr Morales aware
Dr Morales aware

## 2023-06-20 NOTE — PROVIDER CONTACT NOTE (OTHER) - ACTION/TREATMENT ORDERED:
MD notified. MD stated hold BP medication prior to going to dialysis.
Provider notified. Mucinex ordered.
Provider notified. No new orders at this time.
Pt stable enough to go to scheduled CT and dialysis. Pt transported off floor. MD will f/u with gynecologist to evaluate pt when pt arrives at dialysis. Pt safety maintained, will continue to monitor.
No interventions presently.
MD made aware. No need to give BP meds now, patient is getting HD soon. Reassess in 30 minutes.
MD will evaluate pt.
No interventions presently.
Provider notified. No new orders at this time give amlodipine as ordered.

## 2023-06-20 NOTE — PROGRESS NOTE ADULT - PROBLEM SELECTOR PLAN 8
- DVT ppx: Hold pharmacologic ppx for now given possible GIB. SCDs.   - Diet: DASH/TLC  - Dispositon: Per PT, home w/o PT needs - DVT ppx: Hold pharmacologic ppx for now given possible GIB. SCDs.   - Diet: DASH/TLC  - Dispositon: Per PT, home w/o PT needs, pending HD center setup

## 2023-06-20 NOTE — PROVIDER CONTACT NOTE (OTHER) - SITUATION
Pt /57. Pt to go down to hemodialysis
Pt is having sanguinous drainage from R nephrostomy tube
/62
Pt's vaginal gauze packing soaked with blood. Small to moderate amount of blood soiled on incontinence pad.
Pt was having an intermittent dry cough
patient has bright red vaginal bleeding/discharge. no acute distress noted. Patient denies chest pain or SOB at this time.
Pt continue to have sanguinous drainage from R nephrostomy tube and nam catheter have no output
/65 after returning from IR.
Patient has dry cough. Patient requesting other po (oral) medication besides robitussin.

## 2023-06-20 NOTE — PROVIDER CONTACT NOTE (OTHER) - REASON
/65 after returning from IR.
Vaginal gauze packing soaked with blood.
/62
patient has bright red vaginal bleeding/discharge.
Pt continue to have sanguinous drainage from R nephrostomy tube and nam catheter have no output
Pt awaiting transport to hemodialysis with AM BP meds
Patient has dry cough
Pt is having sanguinous drainage from R nephrostomy tube
Pt was having an intermittent dry cough

## 2023-06-20 NOTE — PROVIDER CONTACT NOTE (OTHER) - ASSESSMENT
Pt continue to have sanguinous drainage from R nephrostomy tube and nam catheter have no output. Vital signs stable.
Patient has dry cough. Patient requesting other po (oral) medication besides robitussin.
/62, patient denies headache, no acute distress noted
Pt asymptomatic at this time with no s/s of acute distress noted.
Pt was having an intermittent dry cough. Vital signs stable.
/65, HR 80. Pt is calm, no c/o pain/discomfort.
Pt is having sanguinous drainage from R nephrostomy tube
Pt appears calm, no acute distress noted. Pt denies any pain/discomfort at this time. BP of 179/77, HR of 85, Temp 98.4F and spO2 of 96% with RR of 18.
patient has bright red vaginal bleeding/discharge. no acute distress noted. Patient denies chest pain or SOB at this time.

## 2023-06-20 NOTE — PROGRESS NOTE ADULT - PROBLEM SELECTOR PLAN 2
Pt. with hyperphosphatemia in the setting of renal failure. Serum phosphorus is elevated at 6.3 today. Plan for HD today. Low phosphorus diet. Consider starting phosphorus binder if serum phosphorus remains elevated. Monitor serum phosphorus, goal: 3.5-5.5.    Check iPTH.

## 2023-06-20 NOTE — PROVIDER CONTACT NOTE (OTHER) - RECOMMENDATIONS
MD evaluation
Notify provider.
Notify provider.
Give amlodipine as ordered
Monitor
MD notified. Patient will be going to dialysis around 9pm.
Notify MD.

## 2023-06-20 NOTE — PROGRESS NOTE ADULT - PROBLEM SELECTOR PLAN 2
CTAP noncontrast demonstrating moderate right-sided hydroureteronephrosis, with obstruction proximal to the UVJ in the region of a 3.6 x 2.9 x 4.6 cm bulbous soft tissue mass inseparable from the cervical remnant, mild left-sided hydroureteronephrosis to the level of the UVJ, and pelvic and RP lymphadenopathy concerning for metastatic disease. Pt with history of high-grade TAYLER of cervix requiring MILVIA/BSO in 2008.  - CT chest noncontrast ordered to r/o mets > no lung mets  - CTH noncontrast also ordered to r/o mets and given nausea, though of limited utility due to lack of contrast. Possibly defer CTH after initiation of HD to perform study with contrast.  >> no intracranial mass or bleeding  - s/p vaginal mass biopsy by gyn and pending path  - f/u w/ Gyn CTAP noncontrast demonstrating moderate right-sided hydroureteronephrosis, with obstruction proximal to the UVJ in the region of a 3.6 x 2.9 x 4.6 cm bulbous soft tissue mass inseparable from the cervical remnant, mild left-sided hydroureteronephrosis to the level of the UVJ, and pelvic and RP lymphadenopathy concerning for metastatic disease. Pt with history of high-grade TAYLER of cervix requiring MILVIA/BSO in 2008.  - CT chest noncontrast ordered to r/o mets > no lung mets  - CTH noncontrast also ordered to r/o mets and given nausea, though of limited utility due to lack of contrast. Possibly defer CTH after initiation of HD to perform study with contrast.  >> no intracranial mass or bleeding  - s/p vaginal mass biopsy by gyn and pending path  - f/u w/ Gyn - family w/ questions

## 2023-06-20 NOTE — PROVIDER CONTACT NOTE (OTHER) - DATE AND TIME:
07-Jun-2023 21:25
10-Bowen-2023 00:36
10-Bowen-2023 01:50
07-Jun-2023 18:25
09-Jun-2023 00:04
11-Jun-2023 21:34
12-Jun-2023 14:28
17-Jun-2023 05:20
20-Jun-2023 05:50

## 2023-06-20 NOTE — PROGRESS NOTE ADULT - PROBLEM SELECTOR PLAN 5
BPs elevated to 170s-190s systolic on admission, likely in setting of severe renal failure. Pt on valsartan/HCTZ at home.  -Amlodipine 5 mg daily

## 2023-06-21 ENCOUNTER — TRANSCRIPTION ENCOUNTER (OUTPATIENT)
Age: 83
End: 2023-06-21

## 2023-06-21 LAB
ANION GAP SERPL CALC-SCNC: 17 MMOL/L — HIGH (ref 7–14)
BUN SERPL-MCNC: 23 MG/DL — SIGNIFICANT CHANGE UP (ref 7–23)
CALCIUM SERPL-MCNC: 9 MG/DL — SIGNIFICANT CHANGE UP (ref 8.4–10.5)
CHLORIDE SERPL-SCNC: 97 MMOL/L — LOW (ref 98–107)
CO2 SERPL-SCNC: 24 MMOL/L — SIGNIFICANT CHANGE UP (ref 22–31)
CREAT SERPL-MCNC: 7.77 MG/DL — HIGH (ref 0.5–1.3)
CULTURE RESULTS: SIGNIFICANT CHANGE UP
EGFR: 5 ML/MIN/1.73M2 — LOW
GLUCOSE SERPL-MCNC: 92 MG/DL — SIGNIFICANT CHANGE UP (ref 70–99)
HCT VFR BLD CALC: 27 % — LOW (ref 34.5–45)
HGB BLD-MCNC: 8.9 G/DL — LOW (ref 11.5–15.5)
MAGNESIUM SERPL-MCNC: 2 MG/DL — SIGNIFICANT CHANGE UP (ref 1.6–2.6)
MCHC RBC-ENTMCNC: 29.9 PG — SIGNIFICANT CHANGE UP (ref 27–34)
MCHC RBC-ENTMCNC: 33 GM/DL — SIGNIFICANT CHANGE UP (ref 32–36)
MCV RBC AUTO: 90.6 FL — SIGNIFICANT CHANGE UP (ref 80–100)
NRBC # BLD: 0 /100 WBCS — SIGNIFICANT CHANGE UP (ref 0–0)
NRBC # FLD: 0 K/UL — SIGNIFICANT CHANGE UP (ref 0–0)
PHOSPHATE SERPL-MCNC: 4.4 MG/DL — SIGNIFICANT CHANGE UP (ref 2.5–4.5)
PLATELET # BLD AUTO: 296 K/UL — SIGNIFICANT CHANGE UP (ref 150–400)
POTASSIUM SERPL-MCNC: 3.9 MMOL/L — SIGNIFICANT CHANGE UP (ref 3.5–5.3)
POTASSIUM SERPL-SCNC: 3.9 MMOL/L — SIGNIFICANT CHANGE UP (ref 3.5–5.3)
PTH-INTACT FLD-MCNC: 82 PG/ML — HIGH (ref 15–65)
RBC # BLD: 2.98 M/UL — LOW (ref 3.8–5.2)
RBC # FLD: 12.3 % — SIGNIFICANT CHANGE UP (ref 10.3–14.5)
SODIUM SERPL-SCNC: 138 MMOL/L — SIGNIFICANT CHANGE UP (ref 135–145)
SPECIMEN SOURCE: SIGNIFICANT CHANGE UP
WBC # BLD: 8.06 K/UL — SIGNIFICANT CHANGE UP (ref 3.8–10.5)
WBC # FLD AUTO: 8.06 K/UL — SIGNIFICANT CHANGE UP (ref 3.8–10.5)

## 2023-06-21 RX ORDER — ASPIRIN/CALCIUM CARB/MAGNESIUM 324 MG
1 TABLET ORAL
Refills: 0 | DISCHARGE

## 2023-06-21 RX ORDER — AMLODIPINE BESYLATE 2.5 MG/1
1 TABLET ORAL
Qty: 0 | Refills: 0 | DISCHARGE
Start: 2023-06-21

## 2023-06-21 RX ORDER — PANTOPRAZOLE SODIUM 20 MG/1
1 TABLET, DELAYED RELEASE ORAL
Qty: 0 | Refills: 0 | DISCHARGE
Start: 2023-06-21

## 2023-06-21 RX ORDER — PREGABALIN 225 MG/1
1 CAPSULE ORAL
Qty: 0 | Refills: 0 | DISCHARGE
Start: 2023-06-21

## 2023-06-21 RX ADMIN — CHLORHEXIDINE GLUCONATE 1 APPLICATION(S): 213 SOLUTION TOPICAL at 05:08

## 2023-06-21 RX ADMIN — PANTOPRAZOLE SODIUM 40 MILLIGRAM(S): 20 TABLET, DELAYED RELEASE ORAL at 19:20

## 2023-06-21 RX ADMIN — ATORVASTATIN CALCIUM 10 MILLIGRAM(S): 80 TABLET, FILM COATED ORAL at 22:03

## 2023-06-21 RX ADMIN — ONDANSETRON 4 MILLIGRAM(S): 8 TABLET, FILM COATED ORAL at 19:21

## 2023-06-21 RX ADMIN — CHLORHEXIDINE GLUCONATE 1 APPLICATION(S): 213 SOLUTION TOPICAL at 12:08

## 2023-06-21 RX ADMIN — AMLODIPINE BESYLATE 5 MILLIGRAM(S): 2.5 TABLET ORAL at 05:09

## 2023-06-21 RX ADMIN — PANTOPRAZOLE SODIUM 40 MILLIGRAM(S): 20 TABLET, DELAYED RELEASE ORAL at 05:09

## 2023-06-21 RX ADMIN — Medication 100 MICROGRAM(S): at 05:09

## 2023-06-21 RX ADMIN — HEPARIN SODIUM 5000 UNIT(S): 5000 INJECTION INTRAVENOUS; SUBCUTANEOUS at 19:21

## 2023-06-21 RX ADMIN — PREGABALIN 1000 MICROGRAM(S): 225 CAPSULE ORAL at 12:07

## 2023-06-21 RX ADMIN — HEPARIN SODIUM 5000 UNIT(S): 5000 INJECTION INTRAVENOUS; SUBCUTANEOUS at 06:56

## 2023-06-21 NOTE — PROGRESS NOTE ADULT - PROBLEM SELECTOR PLAN 1
Serum Cr 16.77 and BUN 83 at presentation. Unknown baseline renal function, as no prior labs available. Concern for KINJAL on - CKD from obstructive uropathy, GI fluid losses (diarrhea), and poor PO intake.   - CTAP noncontrast obtained overnight and demonstrating moderate right-sided hydroureteronephrosis, with obstruction proximal to the UVJ in the region of a 3.6 x 2.9 x 4.6 cm bulbous soft tissue mass inseparable from the cervical remnant, mild left-sided hydroureteronephrosis to the level of the UVJ, and pelvic and RP lymphadenopathy concerning for metastatic disease  - Non-tunneled catheter placed 06/06, s/p 2nd HD. R nephrostomy tube placed 06/08  - Plan for long-term HD, tunneled cath placement on 6/19 w/ IR  - Arranging w/ SW/CM regarding home HD. Serum Cr 16.77 and BUN 83 at presentation. Unknown baseline renal function, as no prior labs available. Concern for KINJAL on - CKD from obstructive uropathy, GI fluid losses (diarrhea), and poor PO intake.   - CTAP noncontrast obtained overnight and demonstrating moderate right-sided hydroureteronephrosis, with obstruction proximal to the UVJ in the region of a 3.6 x 2.9 x 4.6 cm bulbous soft tissue mass inseparable from the cervical remnant, mild left-sided hydroureteronephrosis to the level of the UVJ, and pelvic and RP lymphadenopathy concerning for metastatic disease  - R nephrostomy tube placed 06/08. Long-term HD, tunneled cath placement on 6/19 w/ IR  - Arranging w/ SW/CM regarding home HD - setup  - Per neph want to dialyze on 6/22 AM first shift before DC

## 2023-06-21 NOTE — DISCHARGE NOTE NURSING/CASE MANAGEMENT/SOCIAL WORK - NSDCFUADDAPPT_GEN_ALL_CORE_FT
Recommendation:   - chemotherapy and palliative pelvic RT;   - follow up PDL1 testing;   - PET CT scan  - [facility]    Please follow up with Interventional Radiology (Dr. Wilcox) for nephrostomy tube exchange in 3 months.   IR office can be reached at [phone] to set up an appointment.

## 2023-06-21 NOTE — DISCHARGE NOTE NURSING/CASE MANAGEMENT/SOCIAL WORK - PATIENT PORTAL LINK FT
You can access the FollowMyHealth Patient Portal offered by [facility] by registering at the following website: [email or web address]. By joining [employer]’s FollowMyHealth portal, you will also be able to view your health information using other applications (apps) compatible with our system.

## 2023-06-21 NOTE — PROGRESS NOTE ADULT - PROBLEM SELECTOR PLAN 8
- DVT ppx: Hold pharmacologic ppx for now given possible GIB. SCDs.   - Diet: DASH/TLC  - Dispositon: Per PT, home w/o PT needs, pending HD center setup

## 2023-06-21 NOTE — PROGRESS NOTE ADULT - ASSESSMENT
84 yo woman with history of HTN, HLD, hypothyroidism, and high-grade squamous intraepithelial lesion s/p MILVIA/BSO (2008) presents with nausea and diarrhea, including with melena, for past ~1 week. Found to be in acute renal failure 2/2 an obstructing soft tissue mass inseparable from the cervical remnant and proximal to the UVJ, also with possible GIB. s/p Non-tunneled catheter for HD and R nephrostomy tube placement for R side obstructive uropathy from pelvic mass, getting tunneled cath on 6/19.  84 yo woman with history of HTN, HLD, hypothyroidism, and high-grade squamous intraepithelial lesion s/p MILVIA/BSO (2008) presents with nausea and diarrhea, including with melena, for past ~1 week. Found to be in acute renal failure 2/2 an obstructing soft tissue mass inseparable from the cervical remnant and proximal to the UVJ, also with possible GIB. s/p Non-tunneled catheter for HD and R nephrostomy tube placement for R side obstructive uropathy from pelvic mass, tunneled cath on 6/19, pending HD in the AM before discharge

## 2023-06-21 NOTE — DISCHARGE NOTE NURSING/CASE MANAGEMENT/SOCIAL WORK - NSDCCRNAME_GEN_ALL_CORE_FT
[facility]  [address] [place] [address]  Tele#[phone]  Fax# [phone]  Dialysis scheduled Tuesday, Thursday and Saturday at 2:30pm  Admission set for Friday 6/23 at 1:30pm

## 2023-06-21 NOTE — PROGRESS NOTE ADULT - PROBLEM SELECTOR PLAN 2
CTAP noncontrast demonstrating moderate right-sided hydroureteronephrosis, with obstruction proximal to the UVJ in the region of a 3.6 x 2.9 x 4.6 cm bulbous soft tissue mass inseparable from the cervical remnant, mild left-sided hydroureteronephrosis to the level of the UVJ, and pelvic and RP lymphadenopathy concerning for metastatic disease. Pt with history of high-grade TAYLER of cervix requiring MILVIA/BSO in 2008.  - CT chest noncontrast ordered to r/o mets > no lung mets  - CTH noncontrast also ordered to r/o mets and given nausea, though of limited utility due to lack of contrast. Possibly defer CTH after initiation of HD to perform study with contrast.  >> no intracranial mass or bleeding  - s/p vaginal mass biopsy by gyn and pending path  - f/u w/ Gyn - family w/ questions CTAP noncontrast demonstrating moderate right-sided hydroureteronephrosis, with obstruction proximal to the UVJ in the region of a 3.6 x 2.9 x 4.6 cm bulbous soft tissue mass inseparable from the cervical remnant, mild left-sided hydroureteronephrosis to the level of the UVJ, and pelvic and RP lymphadenopathy concerning for metastatic disease. Pt with history of high-grade TAYLER of cervix requiring MILVIA/BSO in 2008.  - CT chest noncontrast ordered to r/o mets > no lung mets  - CTH noncontrast also ordered to r/o mets and given nausea, though of limited utility due to lack of contrast. Possibly defer CTH after initiation of HD to perform study with contrast.  >> no intracranial mass or bleeding  - s/p vaginal mass biopsy by gyn and pending path  - will f/u w/ gyn-onc, med-oncology on DC

## 2023-06-21 NOTE — PROGRESS NOTE ADULT - ATTENDING COMMENTS
Chart reviewed. Vitals and Labs noted.   Pt seen and examined at bedside. Plan formulated with the resident. Detail H&P as above.       Dx:   Acute renal failure, metabolic acidosis. improving.   Obstructive uropathy/ hydronephrosis due to obstructing soft tissue mass, with possible component of ATN/AIN given daily NSAID intake.   Renal and urology eval appreciated.  (GI symptoms likely renal related: neg GI PCR and neg c.diff)  s/p Shiley and right nephrostomy placement given CT finding of soft tissue mass, causing b/l hydronephrosis.  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 left kidney UOP steadily improved  repeat renal US stable 6/11/23   hold ARB/HCTZ upon discharge. Cont Norvasc for BP control, BP improved with dialysis  Repeat renal U/S 6/15/23 unchanged  Cr worsened 6/16/23, HD restarted.   Tunneled HD catheter placed 6/19/23  DVT ppx  will need outpt renal follow up.   d/w the daughter at bedside.    Dc planning. SW to set up HD.    - Dr. SARTHAK Boone ([facility])  - [phone]

## 2023-06-21 NOTE — PROGRESS NOTE ADULT - SUBJECTIVE AND OBJECTIVE BOX
***************************************************************  Gilmore (Ji-Cheng) [facility] [place] PGY1  Internal Medicine   ***************************************************************    BATSHEVA HOBBS  83y  MRN: [id number]    Patient is a 83y old  Female who presents with a chief complaint of Nausea, diarrhea, and renal failure (20 Jun 2023 12:29)      Subjective: no events ON. Denies fever, CP, SOB, abn pain, N/V, dysuria. Tolerating diet.      MEDICATIONS  (STANDING):  amLODIPine   Tablet 5 milliGRAM(s) Oral daily  atorvastatin 10 milliGRAM(s) Oral at bedtime  chlorhexidine 2% Cloths 1 Application(s) Topical <User Schedule>  chlorhexidine 2% Cloths 1 Application(s) Topical daily  cyanocobalamin 1000 MICROGram(s) Oral daily  epoetin ruth-epbx (RETACRIT) Injectable 4000 Unit(s) IV Push <User Schedule>  heparin   Injectable 5000 Unit(s) SubCutaneous every 12 hours  levothyroxine 100 MICROGram(s) Oral daily  pantoprazole    Tablet 40 milliGRAM(s) Oral two times a day    MEDICATIONS  (PRN):  acetaminophen     Tablet .. 650 milliGRAM(s) Oral every 6 hours PRN Temp greater or equal to 38C (100.4F), Mild Pain (1 - 3), Moderate Pain (4 - 6)  acetaminophen     Tablet .. 975 milliGRAM(s) Oral every 6 hours PRN Severe Pain (7 - 10)  guaiFENesin Oral Liquid (Sugar-Free) 100 milliGRAM(s) Oral every 6 hours PRN Cough  ondansetron Injectable 4 milliGRAM(s) IV Push every 6 hours PRN Nausea and/or Vomiting  polyethylene glycol 3350 17 Gram(s) Oral daily PRN Constipation  senna 2 Tablet(s) Oral at bedtime PRN Constipation  sodium chloride 0.9% Bolus. 100 milliLiter(s) IV Bolus every 5 minutes PRN SBP LESS THAN or EQUAL to 100 mmHg  sodium chloride 0.9% lock flush 10 milliLiter(s) IV Push every 1 hour PRN Pre/post blood products, medications, blood draw, and to maintain line patency      Objective:    Vitals: Vital Signs Last 24 Hrs  T(C): 37.2 (06-21-23 @ 05:40), Max: 37.2 (06-21-23 @ 05:40)  T(F): 98.9 (06-21-23 @ 05:40), Max: 98.9 (06-21-23 @ 05:40)  HR: 95 (06-21-23 @ 05:40) (84 - 95)  BP: 129/88 (06-21-23 @ 05:40) (129/88 - 157/67)  BP(mean): --  RR: 17 (06-21-23 @ 05:40) (16 - 17)  SpO2: 98% (06-21-23 @ 05:40) (96% - 98%)            I&O's Summary    20 Jun 2023 07:01  -  21 Jun 2023 07:00  --------------------------------------------------------  IN: 1700 mL / OUT: 2800 mL / NET: -1100 mL        PHYSICAL EXAM:  GENERAL: NAD  HEAD:  Atraumatic, Normocephalic  EYES: EOMI, conjunctiva and sclera clear  CHEST/LUNG: Clear to auscultation bilaterally; No rales, rhonchi, wheezing, or rubs  HEART: Regular rate and rhythm; No murmurs, rubs, or gallops  ABDOMEN: Soft, Nontender, Nondistended;   SKIN: No rashes or lesions  NERVOUS SYSTEM:  Alert & Oriented X3, no focal deficits    LABS:  06-21    138  |  97<L>  |  23  ----------------------------<  92  3.9   |  24  |  7.77<H>  06-20    134<L>  |  94<L>  |  38<H>  ----------------------------<  89  3.9   |  22  |  11.74<H>  06-19    134<L>  |  96<L>  |  33<H>  ----------------------------<  94  3.8   |  23  |  10.41<H>    Ca    9.0      21 Jun 2023 05:25  Ca    8.7      20 Jun 2023 06:48  Ca    8.8      19 Jun 2023 06:38  Phos  4.4     06-21  Mg     2.00     06-21                      Urinalysis Basic - ( 21 Jun 2023 05:25 )    Color: x / Appearance: x / SG: x / pH: x  Gluc: 92 mg/dL / Ketone: x  / Bili: x / Urobili: x   Blood: x / Protein: x / Nitrite: x   Leuk Esterase: x / RBC: x / WBC x   Sq Epi: x / Non Sq Epi: x / Bacteria: x                              8.9    8.06  )-----------( 296      ( 21 Jun 2023 05:25 )             27.0                         8.4    9.11  )-----------( 312      ( 20 Jun 2023 06:48 )             25.6                         8.5    8.72  )-----------( 345      ( 19 Jun 2023 06:38 )             25.6     CAPILLARY BLOOD GLUCOSE          RADIOLOGY & ADDITIONAL TESTS:    Imaging Personally Reviewed:  [x ] YES  [ ] NO    Consultants involved in case:   Consultant(s) Notes Reviewed:  [ x] YES  [ ] NO:   Care Discussed with Consultants/Other Providers [x ] YES  [ ] NO         ***************************************************************  Gilmore (Ji-Cheng) [facility] PGY1  Internal Medicine   ***************************************************************    BATSHEVA HOBBS  83y  MRN: [id number]    Patient is a 83y old  Female who presents with a chief complaint of Nausea, diarrhea, and renal failure (20 Jun 2023 12:29)      Subjective: NAEO. No acute complaints today.     MEDICATIONS  (STANDING):  amLODIPine   Tablet 5 milliGRAM(s) Oral daily  atorvastatin 10 milliGRAM(s) Oral at bedtime  chlorhexidine 2% Cloths 1 Application(s) Topical <User Schedule>  chlorhexidine 2% Cloths 1 Application(s) Topical daily  cyanocobalamin 1000 MICROGram(s) Oral daily  epoetin ruth-epbx (RETACRIT) Injectable 4000 Unit(s) IV Push <User Schedule>  heparin   Injectable 5000 Unit(s) SubCutaneous every 12 hours  levothyroxine 100 MICROGram(s) Oral daily  pantoprazole    Tablet 40 milliGRAM(s) Oral two times a day    MEDICATIONS  (PRN):  acetaminophen     Tablet .. 650 milliGRAM(s) Oral every 6 hours PRN Temp greater or equal to 38C (100.4F), Mild Pain (1 - 3), Moderate Pain (4 - 6)  acetaminophen     Tablet .. 975 milliGRAM(s) Oral every 6 hours PRN Severe Pain (7 - 10)  guaiFENesin Oral Liquid (Sugar-Free) 100 milliGRAM(s) Oral every 6 hours PRN Cough  ondansetron Injectable 4 milliGRAM(s) IV Push every 6 hours PRN Nausea and/or Vomiting  polyethylene glycol 3350 17 Gram(s) Oral daily PRN Constipation  senna 2 Tablet(s) Oral at bedtime PRN Constipation  sodium chloride 0.9% Bolus. 100 milliLiter(s) IV Bolus every 5 minutes PRN SBP LESS THAN or EQUAL to 100 mmHg  sodium chloride 0.9% lock flush 10 milliLiter(s) IV Push every 1 hour PRN Pre/post blood products, medications, blood draw, and to maintain line patency      Objective:    Vitals: Vital Signs Last 24 Hrs  T(C): 37.2 (06-21-23 @ 05:40), Max: 37.2 (06-21-23 @ 05:40)  T(F): 98.9 (06-21-23 @ 05:40), Max: 98.9 (06-21-23 @ 05:40)  HR: 95 (06-21-23 @ 05:40) (84 - 95)  BP: 129/88 (06-21-23 @ 05:40) (129/88 - 157/67)  BP(mean): --  RR: 17 (06-21-23 @ 05:40) (16 - 17)  SpO2: 98% (06-21-23 @ 05:40) (96% - 98%)            I&O's Summary    20 Jun 2023 07:01  -  21 Jun 2023 07:00  --------------------------------------------------------  IN: 1700 mL / OUT: 2800 mL / NET: -1100 mL        PHYSICAL EXAM:  GENERAL: NAD  HEAD:  Atraumatic, Normocephalic  EYES: EOMI, conjunctiva and sclera clear  CHEST/LUNG: Clear to auscultation bilaterally; No rales, rhonchi, wheezing, or rubs  HEART: Regular rate and rhythm; No murmurs, rubs, or gallops  ABDOMEN: Soft, Nontender, Nondistended;   SKIN: No rashes or lesions  NERVOUS SYSTEM:  Alert & Oriented X3, no focal deficits    LABS:  06-21    138  |  97<L>  |  23  ----------------------------<  92  3.9   |  24  |  7.77<H>  06-20    134<L>  |  94<L>  |  38<H>  ----------------------------<  89  3.9   |  22  |  11.74<H>  06-19    134<L>  |  96<L>  |  33<H>  ----------------------------<  94  3.8   |  23  |  10.41<H>    Ca    9.0      21 Jun 2023 05:25  Ca    8.7      20 Jun 2023 06:48  Ca    8.8      19 Jun 2023 06:38  Phos  4.4     06-21  Mg     2.00     06-21                      Urinalysis Basic - ( 21 Jun 2023 05:25 )    Color: x / Appearance: x / SG: x / pH: x  Gluc: 92 mg/dL / Ketone: x  / Bili: x / Urobili: x   Blood: x / Protein: x / Nitrite: x   Leuk Esterase: x / RBC: x / WBC x   Sq Epi: x / Non Sq Epi: x / Bacteria: x                              8.9    8.06  )-----------( 296      ( 21 Jun 2023 05:25 )             27.0                         8.4    9.11  )-----------( 312      ( 20 Jun 2023 06:48 )             25.6                         8.5    8.72  )-----------( 345      ( 19 Jun 2023 06:38 )             25.6     CAPILLARY BLOOD GLUCOSE          RADIOLOGY & ADDITIONAL TESTS:    Imaging Personally Reviewed:  [x ] YES  [ ] NO    Consultants involved in case:   Consultant(s) Notes Reviewed:  [ x] YES  [ ] NO:   Care Discussed with Consultants/Other Providers [x ] YES  [ ] NO

## 2023-06-22 ENCOUNTER — APPOINTMENT (OUTPATIENT)
Dept: OPHTHALMOLOGY | Facility: CLINIC | Age: 83
End: 2023-06-22

## 2023-06-22 VITALS
SYSTOLIC BLOOD PRESSURE: 146 MMHG | HEART RATE: 89 BPM | TEMPERATURE: 99 F | DIASTOLIC BLOOD PRESSURE: 58 MMHG | RESPIRATION RATE: 18 BRPM | OXYGEN SATURATION: 99 %

## 2023-06-22 LAB
ANION GAP SERPL CALC-SCNC: 18 MMOL/L — HIGH (ref 7–14)
BUN SERPL-MCNC: 30 MG/DL — HIGH (ref 7–23)
CALCIUM SERPL-MCNC: 8.9 MG/DL — SIGNIFICANT CHANGE UP (ref 8.4–10.5)
CHLORIDE SERPL-SCNC: 95 MMOL/L — LOW (ref 98–107)
CO2 SERPL-SCNC: 23 MMOL/L — SIGNIFICANT CHANGE UP (ref 22–31)
CREAT SERPL-MCNC: 9.44 MG/DL — HIGH (ref 0.5–1.3)
EGFR: 4 ML/MIN/1.73M2 — LOW
GLUCOSE SERPL-MCNC: 80 MG/DL — SIGNIFICANT CHANGE UP (ref 70–99)
HCT VFR BLD CALC: 25 % — LOW (ref 34.5–45)
HGB BLD-MCNC: 8 G/DL — LOW (ref 11.5–15.5)
MAGNESIUM SERPL-MCNC: 1.9 MG/DL — SIGNIFICANT CHANGE UP (ref 1.6–2.6)
MCHC RBC-ENTMCNC: 29.7 PG — SIGNIFICANT CHANGE UP (ref 27–34)
MCHC RBC-ENTMCNC: 32 GM/DL — SIGNIFICANT CHANGE UP (ref 32–36)
MCV RBC AUTO: 92.9 FL — SIGNIFICANT CHANGE UP (ref 80–100)
NRBC # BLD: 0 /100 WBCS — SIGNIFICANT CHANGE UP (ref 0–0)
NRBC # FLD: 0 K/UL — SIGNIFICANT CHANGE UP (ref 0–0)
PHOSPHATE SERPL-MCNC: 5.5 MG/DL — HIGH (ref 2.5–4.5)
PLATELET # BLD AUTO: 311 K/UL — SIGNIFICANT CHANGE UP (ref 150–400)
POTASSIUM SERPL-MCNC: 3.5 MMOL/L — SIGNIFICANT CHANGE UP (ref 3.5–5.3)
POTASSIUM SERPL-SCNC: 3.5 MMOL/L — SIGNIFICANT CHANGE UP (ref 3.5–5.3)
RBC # BLD: 2.69 M/UL — LOW (ref 3.8–5.2)
RBC # FLD: 12.3 % — SIGNIFICANT CHANGE UP (ref 10.3–14.5)
SODIUM SERPL-SCNC: 136 MMOL/L — SIGNIFICANT CHANGE UP (ref 135–145)
WBC # BLD: 7.64 K/UL — SIGNIFICANT CHANGE UP (ref 3.8–10.5)
WBC # FLD AUTO: 7.64 K/UL — SIGNIFICANT CHANGE UP (ref 3.8–10.5)

## 2023-06-22 PROCEDURE — 90935 HEMODIALYSIS ONE EVALUATION: CPT

## 2023-06-22 RX ORDER — HEPARIN SODIUM 5000 [USP'U]/ML
1000 INJECTION INTRAVENOUS; SUBCUTANEOUS ONCE
Refills: 0 | Status: COMPLETED | OUTPATIENT
Start: 2023-06-22 | End: 2023-06-22

## 2023-06-22 RX ORDER — HEPARIN SODIUM 5000 [USP'U]/ML
500 INJECTION INTRAVENOUS; SUBCUTANEOUS
Refills: 0 | Status: COMPLETED | OUTPATIENT
Start: 2023-06-22 | End: 2023-06-22

## 2023-06-22 RX ADMIN — ERYTHROPOIETIN 4000 UNIT(S): 10000 INJECTION, SOLUTION INTRAVENOUS; SUBCUTANEOUS at 07:00

## 2023-06-22 RX ADMIN — Medication 100 MICROGRAM(S): at 05:21

## 2023-06-22 RX ADMIN — HEPARIN SODIUM 500 UNIT(S): 5000 INJECTION INTRAVENOUS; SUBCUTANEOUS at 07:21

## 2023-06-22 RX ADMIN — HEPARIN SODIUM 500 UNIT(S): 5000 INJECTION INTRAVENOUS; SUBCUTANEOUS at 08:15

## 2023-06-22 RX ADMIN — HEPARIN SODIUM 5000 UNIT(S): 5000 INJECTION INTRAVENOUS; SUBCUTANEOUS at 05:21

## 2023-06-22 RX ADMIN — CHLORHEXIDINE GLUCONATE 1 APPLICATION(S): 213 SOLUTION TOPICAL at 11:55

## 2023-06-22 RX ADMIN — PANTOPRAZOLE SODIUM 40 MILLIGRAM(S): 20 TABLET, DELAYED RELEASE ORAL at 05:21

## 2023-06-22 RX ADMIN — PREGABALIN 1000 MICROGRAM(S): 225 CAPSULE ORAL at 11:55

## 2023-06-22 RX ADMIN — HEPARIN SODIUM 1000 UNIT(S): 5000 INJECTION INTRAVENOUS; SUBCUTANEOUS at 07:23

## 2023-06-22 RX ADMIN — CHLORHEXIDINE GLUCONATE 1 APPLICATION(S): 213 SOLUTION TOPICAL at 05:13

## 2023-06-22 NOTE — PROGRESS NOTE ADULT - REASON FOR ADMISSION
Nausea, diarrhea, and renal failure

## 2023-06-22 NOTE — PROGRESS NOTE ADULT - PROVIDER SPECIALTY LIST ADULT
Intervent Radiology
Nephrology
Internal Medicine
Nephrology
Anesthesia
Gastroenterology
Nephrology
Internal Medicine
Nephrology
Internal Medicine

## 2023-06-22 NOTE — PROGRESS NOTE ADULT - PROBLEM SELECTOR PLAN 1
Pt with advanced kidney failure and now with KINJAL in the setting of GI fluid losses (diarrhea), decreased PO intake, meds (Valsartan-HCTZ) and chronic NSAIDs use. Exact duration and etiology of kidney failure remains unknown. Pt presented with melanotic stool/diarrhea for 1 week prior to admission. SCr significantly elevated at 16.77 on ER labs. No prior labs available to review. Baca placed in ER with no urine return noted. Pt with hx of chronic NSAIDs use. Pt likely with ?advanced CKD. CT scan with obstructive uropathy seen - moderate right-sided hydroureteronephrosis secondary to an obstructing soft tissue mass present. Pt underwent R nephrostomy tube placement by IR on 6/8/23. Last HD treatment was on 6/17. R IJ tunneled HD catheter placed on 6/19. Labs reviewed. Undergoing HD today. Will continue to do HD as outpatient accepted to [facility] spot and will go there tomorrow. Dose meds to HD. Monitor labs.

## 2023-06-22 NOTE — PROGRESS NOTE ADULT - ATTENDING COMMENTS
Chart reviewed. Vitals and Labs noted.   Pt seen and examined at bedside. Plan formulated with the resident. Detail H&P as above.     Dx:   Acute renal failure, metabolic acidosis. improving.   Obstructive uropathy/ hydronephrosis due to obstructing soft tissue mass, with possible component of ATN/AIN given daily NSAID intake.   Renal and urology eval appreciated.  (GI symptoms likely renal related: neg GI PCR and neg c.diff)  s/p Shiley and right nephrostomy placement given CT finding of soft tissue mass, causing b/l hydronephrosis.  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 left kidney UOP steadily improved  repeat renal US stable 6/11/23   hold ARB/HCTZ upon discharge. Cont Norvasc for BP control, BP improved with dialysis  Repeat renal U/S 6/15/23 unchanged  Cr worsened 6/16/23, HD restarted.   Tunneled HD catheter placed 6/19/23  DVT ppx  will need outpt renal follow up.   d/w the daughter at bedside.    Dc planning after HD today. SW set up next HD.    - Dr. SARTHAK Boone ([facility])  - [phone]

## 2023-06-22 NOTE — PROGRESS NOTE ADULT - PROBLEM SELECTOR PLAN 8
- DVT ppx: Hold pharmacologic ppx for now given possible GIB. SCDs.   - Diet: DASH/TLC  - Dispositon: Per PT, home w/o PT needs, pending HD center setup - DVT ppx: Hold pharmacologic ppx for now given possible GIB. SCDs.   - Diet: DASH/TLC  - Dispositon: Per PT, home w/o PT needs, HD center setup

## 2023-06-22 NOTE — PROGRESS NOTE ADULT - PROBLEM SELECTOR PLAN 2
Pt. with hyperphosphatemia in the setting of renal failure. Serum phosphorus is improved. Low phosphorus diet. Consider starting phosphorus binder if serum phosphorus remains elevated. Monitor serum phosphorus, goal: 3.5-5.5.

## 2023-06-22 NOTE — PROGRESS NOTE ADULT - ASSESSMENT
82 yo woman with history of HTN, HLD, hypothyroidism, and high-grade squamous intraepithelial lesion s/p MILVIA/BSO (2008) presents with nausea and diarrhea, including with melena, for past ~1 week. Found to be in acute renal failure 2/2 an obstructing soft tissue mass inseparable from the cervical remnant and proximal to the UVJ, also with possible GIB. s/p Non-tunneled catheter for HD and R nephrostomy tube placement for R side obstructive uropathy from pelvic mass, tunneled cath on 6/19, pending HD in the AM before discharge

## 2023-06-22 NOTE — PROGRESS NOTE ADULT - ATTENDING SUPERVISION STATEMENT
Fellow
Resident

## 2023-06-22 NOTE — PROGRESS NOTE ADULT - SUBJECTIVE AND OBJECTIVE BOX
***************************************************************  Gilmore (Ji-Cheng) [facility] PGY1  Internal Medicine   ***************************************************************    BATSHEVA HOBBS  83y  MRN: [id number]    Patient is a 83y old  Female who presents with a chief complaint of Nausea, diarrhea, and renal failure (21 Jun 2023 07:23)      Subjective: no events ON. Denies fever, CP, SOB, abn pain, N/V, dysuria. Tolerating diet.      MEDICATIONS  (STANDING):  amLODIPine   Tablet 5 milliGRAM(s) Oral daily  atorvastatin 10 milliGRAM(s) Oral at bedtime  chlorhexidine 2% Cloths 1 Application(s) Topical daily  chlorhexidine 2% Cloths 1 Application(s) Topical <User Schedule>  cyanocobalamin 1000 MICROGram(s) Oral daily  epoetin ruth-epbx (RETACRIT) Injectable 4000 Unit(s) IV Push <User Schedule>  heparin   Injectable 5000 Unit(s) SubCutaneous every 12 hours  heparin   Injectable. 500 Unit(s) Dialysis. every 1 hour  levothyroxine 100 MICROGram(s) Oral daily  pantoprazole    Tablet 40 milliGRAM(s) Oral two times a day    MEDICATIONS  (PRN):  acetaminophen     Tablet .. 650 milliGRAM(s) Oral every 6 hours PRN Temp greater or equal to 38C (100.4F), Mild Pain (1 - 3), Moderate Pain (4 - 6)  acetaminophen     Tablet .. 975 milliGRAM(s) Oral every 6 hours PRN Severe Pain (7 - 10)  guaiFENesin Oral Liquid (Sugar-Free) 100 milliGRAM(s) Oral every 6 hours PRN Cough  ondansetron Injectable 4 milliGRAM(s) IV Push every 6 hours PRN Nausea and/or Vomiting  polyethylene glycol 3350 17 Gram(s) Oral daily PRN Constipation  senna 2 Tablet(s) Oral at bedtime PRN Constipation  sodium chloride 0.9% Bolus. 100 milliLiter(s) IV Bolus every 5 minutes PRN SBP LESS THAN or EQUAL to 100 mmHg  sodium chloride 0.9% lock flush 10 milliLiter(s) IV Push every 1 hour PRN Pre/post blood products, medications, blood draw, and to maintain line patency      Objective:    Vitals: Vital Signs Last 24 Hrs  T(C): 36.9 (06-22-23 @ 06:00), Max: 36.9 (06-22-23 @ 06:00)  T(F): 98.5 (06-22-23 @ 06:00), Max: 98.5 (06-22-23 @ 06:00)  HR: 82 (06-22-23 @ 06:00) (82 - 90)  BP: 158/69 (06-22-23 @ 06:00) (141/68 - 176/56)  BP(mean): --  RR: 17 (06-22-23 @ 06:00) (17 - 18)  SpO2: 95% (06-22-23 @ 05:20) (95% - 97%)            I&O's Summary    21 Jun 2023 07:01  -  22 Jun 2023 07:00  --------------------------------------------------------  IN: 1380 mL / OUT: 600 mL / NET: 780 mL        PHYSICAL EXAM:  GENERAL: NAD  HEAD:  Atraumatic, Normocephalic  EYES: EOMI, conjunctiva and sclera clear  CHEST/LUNG: Clear to auscultation bilaterally; No rales, rhonchi, wheezing, or rubs  HEART: Regular rate and rhythm; No murmurs, rubs, or gallops  ABDOMEN: Soft, Nontender, Nondistended;   SKIN: No rashes or lesions  NERVOUS SYSTEM:  Alert & Oriented X3, no focal deficits    LABS:  06-21    138  |  97<L>  |  23  ----------------------------<  92  3.9   |  24  |  7.77<H>  06-20    134<L>  |  94<L>  |  38<H>  ----------------------------<  89  3.9   |  22  |  11.74<H>    Ca    9.0      21 Jun 2023 05:25  Ca    8.7      20 Jun 2023 06:48  Phos  4.4     06-21  Mg     2.00     06-21                      Urinalysis Basic - ( 21 Jun 2023 05:25 )    Color: x / Appearance: x / SG: x / pH: x  Gluc: 92 mg/dL / Ketone: x  / Bili: x / Urobili: x   Blood: x / Protein: x / Nitrite: x   Leuk Esterase: x / RBC: x / WBC x   Sq Epi: x / Non Sq Epi: x / Bacteria: x                              8.9    8.06  )-----------( 296      ( 21 Jun 2023 05:25 )             27.0                         8.4    9.11  )-----------( 312      ( 20 Jun 2023 06:48 )             25.6     CAPILLARY BLOOD GLUCOSE          RADIOLOGY & ADDITIONAL TESTS:    Imaging Personally Reviewed:  [x ] YES  [ ] NO    Consultants involved in case:   Consultant(s) Notes Reviewed:  [ x] YES  [ ] NO:   Care Discussed with Consultants/Other Providers [x ] YES  [ ] NO         ***************************************************************  Gilmore (Ji-Cheng) [facility] PGY1  Internal Medicine   ***************************************************************    BATSHEVA HOBBS  83y  MRN: [id number]    Patient is a 83y old  Female who presents with a chief complaint of Nausea, diarrhea, and renal failure (21 Jun 2023 07:23)      Subjective: NAEO. Received HD this morning.      MEDICATIONS  (STANDING):  amLODIPine   Tablet 5 milliGRAM(s) Oral daily  atorvastatin 10 milliGRAM(s) Oral at bedtime  chlorhexidine 2% Cloths 1 Application(s) Topical daily  chlorhexidine 2% Cloths 1 Application(s) Topical <User Schedule>  cyanocobalamin 1000 MICROGram(s) Oral daily  epoetin ruth-epbx (RETACRIT) Injectable 4000 Unit(s) IV Push <User Schedule>  heparin   Injectable 5000 Unit(s) SubCutaneous every 12 hours  heparin   Injectable. 500 Unit(s) Dialysis. every 1 hour  levothyroxine 100 MICROGram(s) Oral daily  pantoprazole    Tablet 40 milliGRAM(s) Oral two times a day    MEDICATIONS  (PRN):  acetaminophen     Tablet .. 650 milliGRAM(s) Oral every 6 hours PRN Temp greater or equal to 38C (100.4F), Mild Pain (1 - 3), Moderate Pain (4 - 6)  acetaminophen     Tablet .. 975 milliGRAM(s) Oral every 6 hours PRN Severe Pain (7 - 10)  guaiFENesin Oral Liquid (Sugar-Free) 100 milliGRAM(s) Oral every 6 hours PRN Cough  ondansetron Injectable 4 milliGRAM(s) IV Push every 6 hours PRN Nausea and/or Vomiting  polyethylene glycol 3350 17 Gram(s) Oral daily PRN Constipation  senna 2 Tablet(s) Oral at bedtime PRN Constipation  sodium chloride 0.9% Bolus. 100 milliLiter(s) IV Bolus every 5 minutes PRN SBP LESS THAN or EQUAL to 100 mmHg  sodium chloride 0.9% lock flush 10 milliLiter(s) IV Push every 1 hour PRN Pre/post blood products, medications, blood draw, and to maintain line patency      Objective:    Vitals: Vital Signs Last 24 Hrs  T(C): 36.9 (06-22-23 @ 06:00), Max: 36.9 (06-22-23 @ 06:00)  T(F): 98.5 (06-22-23 @ 06:00), Max: 98.5 (06-22-23 @ 06:00)  HR: 82 (06-22-23 @ 06:00) (82 - 90)  BP: 158/69 (06-22-23 @ 06:00) (141/68 - 176/56)  BP(mean): --  RR: 17 (06-22-23 @ 06:00) (17 - 18)  SpO2: 95% (06-22-23 @ 05:20) (95% - 97%)            I&O's Summary    21 Jun 2023 07:01  -  22 Jun 2023 07:00  --------------------------------------------------------  IN: 1380 mL / OUT: 600 mL / NET: 780 mL        PHYSICAL EXAM:  GENERAL: NAD  HEAD:  Atraumatic, Normocephalic  EYES: EOMI, conjunctiva and sclera clear  CHEST/LUNG: Clear to auscultation bilaterally; No rales, rhonchi, wheezing, or rubs  HEART: Regular rate and rhythm; No murmurs, rubs, or gallops  ABDOMEN: Soft, Nontender, Nondistended;   SKIN: No rashes or lesions  NERVOUS SYSTEM:  Alert & Oriented X3, no focal deficits    LABS:  06-21    138  |  97<L>  |  23  ----------------------------<  92  3.9   |  24  |  7.77<H>  06-20    134<L>  |  94<L>  |  38<H>  ----------------------------<  89  3.9   |  22  |  11.74<H>    Ca    9.0      21 Jun 2023 05:25  Ca    8.7      20 Jun 2023 06:48  Phos  4.4     06-21  Mg     2.00     06-21                      Urinalysis Basic - ( 21 Jun 2023 05:25 )    Color: x / Appearance: x / SG: x / pH: x  Gluc: 92 mg/dL / Ketone: x  / Bili: x / Urobili: x   Blood: x / Protein: x / Nitrite: x   Leuk Esterase: x / RBC: x / WBC x   Sq Epi: x / Non Sq Epi: x / Bacteria: x                              8.9    8.06  )-----------( 296      ( 21 Jun 2023 05:25 )             27.0                         8.4    9.11  )-----------( 312      ( 20 Jun 2023 06:48 )             25.6     CAPILLARY BLOOD GLUCOSE          RADIOLOGY & ADDITIONAL TESTS:    Imaging Personally Reviewed:  [x ] YES  [ ] NO    Consultants involved in case:   Consultant(s) Notes Reviewed:  [ x] YES  [ ] NO:   Care Discussed with Consultants/Other Providers [x ] YES  [ ] NO

## 2023-06-22 NOTE — PROGRESS NOTE ADULT - PROBLEM SELECTOR PLAN 1
Serum Cr 16.77 and BUN 83 at presentation. Unknown baseline renal function, as no prior labs available. Concern for KINJAL on - CKD from obstructive uropathy, GI fluid losses (diarrhea), and poor PO intake.   - CTAP noncontrast obtained overnight and demonstrating moderate right-sided hydroureteronephrosis, with obstruction proximal to the UVJ in the region of a 3.6 x 2.9 x 4.6 cm bulbous soft tissue mass inseparable from the cervical remnant, mild left-sided hydroureteronephrosis to the level of the UVJ, and pelvic and RP lymphadenopathy concerning for metastatic disease  - R nephrostomy tube placed 06/08. Long-term HD, tunneled cath placement on 6/19 w/ IR  - Arranging w/ SW/CM regarding home HD - setup  - Per neph want to dialyze on 6/22 AM first shift before DC Serum Cr 16.77 and BUN 83 at presentation. Unknown baseline renal function, as no prior labs available. Concern for KINJAL on - CKD from obstructive uropathy, GI fluid losses (diarrhea), and poor PO intake.   - CTAP noncontrast obtained overnight and demonstrating moderate right-sided hydroureteronephrosis, with obstruction proximal to the UVJ in the region of a 3.6 x 2.9 x 4.6 cm bulbous soft tissue mass inseparable from the cervical remnant, mild left-sided hydroureteronephrosis to the level of the UVJ, and pelvic and RP lymphadenopathy concerning for metastatic disease  - R nephrostomy tube placed 06/08. Long-term HD, tunneled cath placement on 6/19 w/ IR  - Arranging w/ SW/CM regarding home HD - setup  - Per neph want to dialyze on 6/22 AM first shift before DC - Completed

## 2023-06-22 NOTE — PROGRESS NOTE ADULT - PROBLEM/PLAN-1
DISPLAY PLAN FREE TEXT
,
DISPLAY PLAN FREE TEXT

## 2023-06-22 NOTE — PROGRESS NOTE ADULT - PROBLEM SELECTOR PROBLEM 1
KINJAL (acute kidney injury)

## 2023-06-22 NOTE — PROGRESS NOTE ADULT - SUBJECTIVE AND OBJECTIVE BOX
[facility] Division of Kidney Diseases & Hypertension  FOLLOW UP NOTE  --------------------------------------------------------------------------------    Chief Complaint: KINJAL due to obstructive uropathy, requiring RRT, now s/p R nephrostomy tube placement, monitoring for renal recovery     24 hour events/subjective: Pt. underwent R nephrostomy tube placement on 6/8. Last HD treatment was on 6/20. Tunneled HD catheter placed on 6/19. Pt. was seen and evaluated at bedside this morning during HD rounds, appeared to be tolerating HD. She continues to feel well, endorses no complaints. She continues to have output from her R PCN and states she is urinating. Scr remains elevated. Denies any headaches, fevers/chills, chest pain, SOB, and LE edema.    PAST HISTORY  --------------------------------------------------------------------------------  No significant changes to PMH, PSH, FHx, SHx, unless otherwise noted    ALLERGIES & MEDICATIONS  --------------------------------------------------------------------------------  Allergies    No Known Allergies    Intolerances      Standing Inpatient Medications  amLODIPine   Tablet 5 milliGRAM(s) Oral daily  atorvastatin 10 milliGRAM(s) Oral at bedtime  chlorhexidine 2% Cloths 1 Application(s) Topical daily  chlorhexidine 2% Cloths 1 Application(s) Topical <User Schedule>  cyanocobalamin 1000 MICROGram(s) Oral daily  epoetin ruth-epbx (RETACRIT) Injectable 4000 Unit(s) IV Push <User Schedule>  heparin   Injectable 5000 Unit(s) SubCutaneous every 12 hours  levothyroxine 100 MICROGram(s) Oral daily  pantoprazole    Tablet 40 milliGRAM(s) Oral two times a day    PRN Inpatient Medications  acetaminophen     Tablet .. 650 milliGRAM(s) Oral every 6 hours PRN  acetaminophen     Tablet .. 975 milliGRAM(s) Oral every 6 hours PRN  guaiFENesin Oral Liquid (Sugar-Free) 100 milliGRAM(s) Oral every 6 hours PRN  ondansetron Injectable 4 milliGRAM(s) IV Push every 6 hours PRN  polyethylene glycol 3350 17 Gram(s) Oral daily PRN  senna 2 Tablet(s) Oral at bedtime PRN  sodium chloride 0.9% Bolus. 100 milliLiter(s) IV Bolus every 5 minutes PRN  sodium chloride 0.9% lock flush 10 milliLiter(s) IV Push every 1 hour PRN      REVIEW OF SYSTEMS  --------------------------------------------------------------------------------  Gen: no fever  Respiratory: no sob  CV: no cp  GI: no ab pain  : as per hpi  MSK: no pain    VITALS/PHYSICAL EXAM  --------------------------------------------------------------------------------  T(C): 36.9 (06-22-23 @ 08:55), Max: 36.9 (06-22-23 @ 06:00)  HR: 89 (06-22-23 @ 08:55) (82 - 90)  BP: 159/71 (06-22-23 @ 08:55) (141/68 - 176/56)  ABP: --  ABP(mean): --  RR: 17 (06-22-23 @ 08:55) (17 - 18)  SpO2: 95% (06-22-23 @ 05:20) (95% - 97%)  CVP(mm Hg): --        06-21-23 @ 07:01  -  06-22-23 @ 07:00  --------------------------------------------------------  IN: 1380 mL / OUT: 600 mL / NET: 780 mL    06-22-23 @ 07:01  -  06-22-23 @ 10:48  --------------------------------------------------------  IN: 600 mL / OUT: 2000 mL / NET: -1400 mL    Physical Exam:  Gen: elderly female, resting, NAD, undergoing HD  HEENT: Anicteric  Pulm: CTA B/L  CV: S1S2+  Abd: Soft, +BS    Ext: Trace LE edema B/L  Neuro: Awake and alert  : +R nephrostomy tube with clear urine  Skin: Warm and dry  IV access: RIJ tunneled HD catheter being used for HD    LABS/STUDIES  --------------------------------------------------------------------------------              8.0    7.64  >-----------<  311      [06-22-23 @ 06:20]              25.0     136  |  95  |  30  ----------------------------<  80      [06-22-23 @ 06:20]  3.5   |  23  |  9.44        Ca     8.9     [06-22-23 @ 06:20]      Mg     1.90     [06-22-23 @ 06:20]      Phos  5.5     [06-22-23 @ 06:20]    Creatinine Trend:  SCr 9.44 [06-22 @ 06:20]  SCr 7.77 [06-21 @ 05:25]  SCr 11.74 [06-20 @ 06:48]  SCr 10.41 [06-19 @ 06:38]  SCr 8.75 [06-18 @ 05:07]    Urinalysis - [06-22-23 @ 06:20]      Color  / Appearance  / SG  / pH       Gluc 80 / Ketone   / Bili  / Urobili        Blood  / Protein  / Leuk Est  / Nitrite       RBC  / WBC  / Hyaline  / Gran  / Sq Epi  / Non Sq Epi  / Bacteria       PTH -- (Ca --)      [06-21-23 @ 05:25]   82

## 2023-06-22 NOTE — PROGRESS NOTE ADULT - PROBLEM SELECTOR PLAN 2
CTAP noncontrast demonstrating moderate right-sided hydroureteronephrosis, with obstruction proximal to the UVJ in the region of a 3.6 x 2.9 x 4.6 cm bulbous soft tissue mass inseparable from the cervical remnant, mild left-sided hydroureteronephrosis to the level of the UVJ, and pelvic and RP lymphadenopathy concerning for metastatic disease. Pt with history of high-grade TAYLER of cervix requiring MILVIA/BSO in 2008.  - CT chest noncontrast ordered to r/o mets > no lung mets  - CTH noncontrast also ordered to r/o mets and given nausea, though of limited utility due to lack of contrast. Possibly defer CTH after initiation of HD to perform study with contrast.  >> no intracranial mass or bleeding  - s/p vaginal mass biopsy by gyn and pending path  - will f/u w/ gyn-onc, med-oncology on DC

## 2023-06-23 RX ORDER — PREGABALIN 225 MG/1
1 CAPSULE ORAL
Qty: 30 | Refills: 0
Start: 2023-06-23 | End: 2023-07-22

## 2023-06-23 RX ORDER — AMLODIPINE BESYLATE 2.5 MG/1
1 TABLET ORAL
Qty: 30 | Refills: 0
Start: 2023-06-23 | End: 2023-07-22

## 2023-06-23 RX ORDER — PANTOPRAZOLE SODIUM 20 MG/1
1 TABLET, DELAYED RELEASE ORAL
Qty: 30 | Refills: 0
Start: 2023-06-23 | End: 2023-07-07

## 2023-06-26 PROBLEM — R87.613 HIGH GRADE SQUAMOUS INTRAEPITHELIAL LESION ON CYTOLOGIC SMEAR OF CERVIX (HGSIL): Chronic | Status: ACTIVE | Noted: 2023-06-07

## 2023-06-26 PROBLEM — I10 ESSENTIAL (PRIMARY) HYPERTENSION: Chronic | Status: ACTIVE | Noted: 2023-06-06

## 2023-06-26 PROBLEM — E78.5 HYPERLIPIDEMIA, UNSPECIFIED: Chronic | Status: ACTIVE | Noted: 2023-06-06

## 2023-06-26 PROBLEM — E03.9 HYPOTHYROIDISM, UNSPECIFIED: Chronic | Status: ACTIVE | Noted: 2023-06-06

## 2023-06-28 ENCOUNTER — OUTPATIENT (OUTPATIENT)
Dept: OUTPATIENT SERVICES | Facility: HOSPITAL | Age: 83
LOS: 1 days | Discharge: HOME | End: 2023-06-28

## 2023-06-28 DIAGNOSIS — Z90.49 ACQUIRED ABSENCE OF OTHER SPECIFIED PARTS OF DIGESTIVE TRACT: Chronic | ICD-10-CM

## 2023-06-28 DIAGNOSIS — Z90.710 ACQUIRED ABSENCE OF BOTH CERVIX AND UTERUS: Chronic | ICD-10-CM

## 2023-06-28 DIAGNOSIS — C52 MALIGNANT NEOPLASM OF VAGINA: ICD-10-CM

## 2023-06-29 ENCOUNTER — NON-APPOINTMENT (OUTPATIENT)
Age: 83
End: 2023-06-29

## 2023-06-30 ENCOUNTER — OUTPATIENT (OUTPATIENT)
Dept: OUTPATIENT SERVICES | Facility: HOSPITAL | Age: 83
LOS: 1 days | Discharge: HOME | End: 2023-06-30

## 2023-06-30 ENCOUNTER — APPOINTMENT (OUTPATIENT)
Dept: RADIATION ONCOLOGY | Facility: CLINIC | Age: 83
End: 2023-06-30
Payer: MEDICARE

## 2023-06-30 VITALS
OXYGEN SATURATION: 96 % | HEIGHT: 61.42 IN | HEART RATE: 107 BPM | TEMPERATURE: 96.08 F | BODY MASS INDEX: 35.08 KG/M2 | DIASTOLIC BLOOD PRESSURE: 74 MMHG | SYSTOLIC BLOOD PRESSURE: 156 MMHG | WEIGHT: 188.2 LBS

## 2023-06-30 DIAGNOSIS — Z86.79 PERSONAL HISTORY OF OTHER DISEASES OF THE CIRCULATORY SYSTEM: ICD-10-CM

## 2023-06-30 DIAGNOSIS — Z90.49 ACQUIRED ABSENCE OF OTHER SPECIFIED PARTS OF DIGESTIVE TRACT: Chronic | ICD-10-CM

## 2023-06-30 DIAGNOSIS — R87.613 HIGH GRADE SQUAMOUS INTRAEPITHELIAL LESION ON CYTOLOGIC SMEAR OF CERVIX (HGSIL): ICD-10-CM

## 2023-06-30 DIAGNOSIS — Z78.9 OTHER SPECIFIED HEALTH STATUS: ICD-10-CM

## 2023-06-30 DIAGNOSIS — Z82.49 FAMILY HISTORY OF ISCHEMIC HEART DISEASE AND OTHER DISEASES OF THE CIRCULATORY SYSTEM: ICD-10-CM

## 2023-06-30 DIAGNOSIS — Z90.710 ACQUIRED ABSENCE OF BOTH CERVIX AND UTERUS: Chronic | ICD-10-CM

## 2023-06-30 DIAGNOSIS — Z86.39 PERSONAL HISTORY OF OTHER ENDOCRINE, NUTRITIONAL AND METABOLIC DISEASE: ICD-10-CM

## 2023-06-30 DIAGNOSIS — Z87.891 PERSONAL HISTORY OF NICOTINE DEPENDENCE: ICD-10-CM

## 2023-06-30 DIAGNOSIS — M25.569 PAIN IN UNSPECIFIED KNEE: ICD-10-CM

## 2023-06-30 PROCEDURE — 99204 OFFICE O/P NEW MOD 45 MIN: CPT | Mod: 25

## 2023-06-30 RX ORDER — VALSARTAN AND HYDROCHLOROTHIAZIDE 160; 12.5 MG/1; MG/1
160-12.5 TABLET, FILM COATED ORAL DAILY
Refills: 0 | Status: DISCONTINUED | COMMUNITY
Start: 2023-06-30 | End: 2023-06-30

## 2023-06-30 RX ORDER — ASPIRIN ENTERIC COATED TABLETS 81 MG 81 MG/1
81 TABLET, DELAYED RELEASE ORAL DAILY
Refills: 0 | Status: DISCONTINUED | COMMUNITY
Start: 2023-06-30 | End: 2023-06-30

## 2023-06-30 RX ORDER — NAPROXEN SODIUM 220 MG
220 TABLET ORAL
Refills: 0 | Status: DISCONTINUED | COMMUNITY
Start: 2023-06-30 | End: 2023-06-30

## 2023-06-30 RX ORDER — CALCIUM CARBONATE/VITAMIN D3 600 MG-20
600-20 TABLET ORAL
Refills: 0 | Status: ACTIVE | COMMUNITY
Start: 2023-06-30

## 2023-06-30 NOTE — REASON FOR VISIT
[Consideration for Non-Curative Therapy] : consideration for non-curative therapy for [Other: ___] : [unfilled] [Family Member] : family member [Other: _____] : [unfilled]

## 2023-06-30 NOTE — VITALS
[Maximal Pain Intensity: 0/10] : 0/10 [Least Pain Intensity: 0/10] : 0/10 [80: Normal activity with effort; some signs or symptoms of disease.] : 80: Normal activity with effort; some signs or symptoms of disease.  [ECOG Performance Status: 1 - Restricted in physically strenuous activity but ambulatory and able to carry out work of a light or sedentary nature] : Performance Status: 1 - Restricted in physically strenuous activity but ambulatory and able to carry out work of a light or sedentary nature, e.g., light house work, office work [Date: ____________] : Patient's last distress assessment performed on [unfilled]. [1 - Distress Level] : Distress Level: 1

## 2023-07-02 ENCOUNTER — APPOINTMENT (OUTPATIENT)
Dept: NUCLEAR MEDICINE | Facility: IMAGING CENTER | Age: 83
End: 2023-07-02
Payer: MEDICARE

## 2023-07-02 ENCOUNTER — OUTPATIENT (OUTPATIENT)
Dept: OUTPATIENT SERVICES | Facility: HOSPITAL | Age: 83
LOS: 1 days | End: 2023-07-02
Payer: MEDICARE

## 2023-07-02 DIAGNOSIS — Z90.49 ACQUIRED ABSENCE OF OTHER SPECIFIED PARTS OF DIGESTIVE TRACT: Chronic | ICD-10-CM

## 2023-07-02 DIAGNOSIS — C53.9 MALIGNANT NEOPLASM OF CERVIX UTERI, UNSPECIFIED: ICD-10-CM

## 2023-07-02 DIAGNOSIS — Z90.710 ACQUIRED ABSENCE OF BOTH CERVIX AND UTERUS: Chronic | ICD-10-CM

## 2023-07-02 PROCEDURE — 78815 PET IMAGE W/CT SKULL-THIGH: CPT

## 2023-07-02 PROCEDURE — 78815 PET IMAGE W/CT SKULL-THIGH: CPT | Mod: 26,PI

## 2023-07-02 PROCEDURE — A9552: CPT

## 2023-07-03 ENCOUNTER — APPOINTMENT (OUTPATIENT)
Dept: NUCLEAR MEDICINE | Facility: CLINIC | Age: 83
End: 2023-07-03

## 2023-07-03 NOTE — PHYSICAL EXAM
[General Appearance - In No Acute Distress] : in no acute distress [] : no respiratory distress [Abdomen Soft] : soft [Normal External Genitalia] : normal external genitalia  [Supraclavicular Lymph Nodes Enlarged Bilaterally] : supraclavicular [Inguinal Lymph Nodes Enlarged Bilaterally] : inguinal [de-identified] : superior vaginal mass with what feels like circumferential involvement of the vaginal wall

## 2023-07-03 NOTE — HISTORY OF PRESENT ILLNESS
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 GYN ONC tumor board on 6/14/23 recommended chemotherapy and palliative pelvic RT. \par \par  6/7/23: CT A/P shows moderate right sided hydroureteronephrosis secondary to an obstructing soft tissue mass inseparable from the cervical remnant. Bulky bilateral pelvic lymph nodes and left retroperitoneal lymph node suspicious for metastatic disease.\par \par 6/7/23: Vaginal mass biopsy shows superficial fragments of squamous cell carcinoma\par \par 6/8/23: IR -right nephrostomy tube placement \par \par 6/19/23:IR - conversion of a nontunneled dialysis catheter to a tunneled dialysis catheter in the right IJ vein\par \par 6/30/23: Patient presents fordiscussion of the role of  radiation therapy in the treatment of a new locally advanced vaginal cancer vs recurrent cervical cancer. She endorsed cough, low appetite, fatigue, and dysphagia. She stated that she lost 15 lb within the last three weeks. She was started on Azithromycin 250 mg daily yesterday.  She started dialysis on Tuesday, Thursday and Saturday at [facility] dialysis center on [place], [place] approximately 2 weeks ago. She is Scheduled for PET scan on 7/2/23 and consultation with Dr Pretty ([facility]) and Dr. Martinez ([facility]) on 7/7/23\par

## 2023-07-03 NOTE — REVIEW OF SYSTEMS
[Cough] : cough [Dysphagia] : dysphagia [Dysphagia: Grade 2 - Symptomatic and altered eating/swallowing] : Dysphagia: Grade 2 - Symptomatic and altered eating/swallowing [Nausea: Grade 1 - Loss of appetite without alteration in eating habits] : Nausea: Grade 1 - Loss of appetite without alteration in eating habits [Fatigue: Grade 2 - Fatigue not relieved by rest; limiting instrumental ADL] : Fatigue: Grade 2 - Fatigue not relieved by rest; limiting instrumental ADL [Cough: Grade 1 - Mild symptoms; nonprescription intervention indicated] : Cough: Grade 1 - Mild symptoms; nonprescription intervention indicated [FreeTextEntry6] : dry

## 2023-07-03 NOTE — OB/GYN HISTORY
[Definite:  ___ (Date)] : the last menstrual period was [unfilled] [Menopause Age: ____] : patient was [unfilled] years old at menopause [___] : Living: [unfilled] [History of Hormone Replacement Therapy] : no history of hormone replacement therapy

## 2023-07-03 NOTE — ADDENDUM
[FreeTextEntry1] :  My review ofPET done yesterday confirms extensive adenopath above level of renals. Is systemic therapy is an option, I would facor that approach with possible consolidation pending response.

## 2023-07-04 ENCOUNTER — TRANSCRIPTION ENCOUNTER (OUTPATIENT)
Age: 83
End: 2023-07-04

## 2023-07-04 ENCOUNTER — INPATIENT (INPATIENT)
Facility: HOSPITAL | Age: 83
LOS: 4 days | Discharge: HOME | End: 2023-07-09
Attending: INTERNAL MEDICINE | Admitting: INTERNAL MEDICINE
Payer: MEDICARE

## 2023-07-04 VITALS
SYSTOLIC BLOOD PRESSURE: 174 MMHG | HEART RATE: 111 BPM | HEIGHT: 61 IN | OXYGEN SATURATION: 97 % | RESPIRATION RATE: 99 BRPM | TEMPERATURE: 98 F | DIASTOLIC BLOOD PRESSURE: 76 MMHG

## 2023-07-04 DIAGNOSIS — N39.0 URINARY TRACT INFECTION, SITE NOT SPECIFIED: ICD-10-CM

## 2023-07-04 DIAGNOSIS — Z90.710 ACQUIRED ABSENCE OF BOTH CERVIX AND UTERUS: Chronic | ICD-10-CM

## 2023-07-04 DIAGNOSIS — Z90.49 ACQUIRED ABSENCE OF OTHER SPECIFIED PARTS OF DIGESTIVE TRACT: Chronic | ICD-10-CM

## 2023-07-04 LAB
ALBUMIN SERPL ELPH-MCNC: 3.8 G/DL — SIGNIFICANT CHANGE UP (ref 3.3–5)
ALP SERPL-CCNC: 72 U/L — SIGNIFICANT CHANGE UP (ref 40–120)
ALT FLD-CCNC: 7 U/L — SIGNIFICANT CHANGE UP (ref 4–33)
ANION GAP SERPL CALC-SCNC: 12 MMOL/L — SIGNIFICANT CHANGE UP (ref 7–14)
APPEARANCE UR: ABNORMAL
AST SERPL-CCNC: 17 U/L — SIGNIFICANT CHANGE UP (ref 4–32)
BACTERIA # UR AUTO: ABNORMAL
BASOPHILS # BLD AUTO: 0.08 K/UL — SIGNIFICANT CHANGE UP (ref 0–0.2)
BASOPHILS NFR BLD AUTO: 0.6 % — SIGNIFICANT CHANGE UP (ref 0–2)
BILIRUB SERPL-MCNC: 0.4 MG/DL — SIGNIFICANT CHANGE UP (ref 0.2–1.2)
BILIRUB UR-MCNC: NEGATIVE — SIGNIFICANT CHANGE UP
BUN SERPL-MCNC: 6 MG/DL — LOW (ref 7–23)
CALCIUM SERPL-MCNC: 9.3 MG/DL — SIGNIFICANT CHANGE UP (ref 8.4–10.5)
CHLORIDE SERPL-SCNC: 95 MMOL/L — LOW (ref 98–107)
CO2 SERPL-SCNC: 28 MMOL/L — SIGNIFICANT CHANGE UP (ref 22–31)
COLOR SPEC: COLORLESS — SIGNIFICANT CHANGE UP
CREAT SERPL-MCNC: 2.69 MG/DL — HIGH (ref 0.5–1.3)
DIFF PNL FLD: ABNORMAL
EGFR: 17 ML/MIN/1.73M2 — LOW
EOSINOPHIL # BLD AUTO: 0.14 K/UL — SIGNIFICANT CHANGE UP (ref 0–0.5)
EOSINOPHIL NFR BLD AUTO: 1.1 % — SIGNIFICANT CHANGE UP (ref 0–6)
EPI CELLS # UR: 8 /HPF — HIGH (ref 0–5)
GLUCOSE SERPL-MCNC: 99 MG/DL — SIGNIFICANT CHANGE UP (ref 70–99)
GLUCOSE UR QL: NEGATIVE — SIGNIFICANT CHANGE UP
HCT VFR BLD CALC: 27.9 % — LOW (ref 34.5–45)
HGB BLD-MCNC: 9 G/DL — LOW (ref 11.5–15.5)
HYALINE CASTS # UR AUTO: 0 /LPF — SIGNIFICANT CHANGE UP (ref 0–7)
IANC: 9.39 K/UL — HIGH (ref 1.8–7.4)
IMM GRANULOCYTES NFR BLD AUTO: 1.1 % — HIGH (ref 0–0.9)
KETONES UR-MCNC: NEGATIVE — SIGNIFICANT CHANGE UP
LEUKOCYTE ESTERASE UR-ACNC: ABNORMAL
LIDOCAIN IGE QN: 80 U/L — HIGH (ref 7–60)
LYMPHOCYTES # BLD AUTO: 1.4 K/UL — SIGNIFICANT CHANGE UP (ref 1–3.3)
LYMPHOCYTES # BLD AUTO: 11.3 % — LOW (ref 13–44)
MCHC RBC-ENTMCNC: 28.8 PG — SIGNIFICANT CHANGE UP (ref 27–34)
MCHC RBC-ENTMCNC: 32.3 GM/DL — SIGNIFICANT CHANGE UP (ref 32–36)
MCV RBC AUTO: 89.1 FL — SIGNIFICANT CHANGE UP (ref 80–100)
MONOCYTES # BLD AUTO: 1.26 K/UL — HIGH (ref 0–0.9)
MONOCYTES NFR BLD AUTO: 10.2 % — SIGNIFICANT CHANGE UP (ref 2–14)
NEUTROPHILS # BLD AUTO: 9.39 K/UL — HIGH (ref 1.8–7.4)
NEUTROPHILS NFR BLD AUTO: 75.7 % — SIGNIFICANT CHANGE UP (ref 43–77)
NITRITE UR-MCNC: POSITIVE
NRBC # BLD: 0 /100 WBCS — SIGNIFICANT CHANGE UP (ref 0–0)
NRBC # FLD: 0 K/UL — SIGNIFICANT CHANGE UP (ref 0–0)
PH UR: 8 — SIGNIFICANT CHANGE UP (ref 5–8)
PLATELET # BLD AUTO: 331 K/UL — SIGNIFICANT CHANGE UP (ref 150–400)
POTASSIUM SERPL-MCNC: 3.4 MMOL/L — LOW (ref 3.5–5.3)
POTASSIUM SERPL-SCNC: 3.4 MMOL/L — LOW (ref 3.5–5.3)
PROT SERPL-MCNC: 7.1 G/DL — SIGNIFICANT CHANGE UP (ref 6–8.3)
PROT UR-MCNC: ABNORMAL
RBC # BLD: 3.13 M/UL — LOW (ref 3.8–5.2)
RBC # FLD: 12.8 % — SIGNIFICANT CHANGE UP (ref 10.3–14.5)
RBC CASTS # UR COMP ASSIST: 2 /HPF — SIGNIFICANT CHANGE UP (ref 0–4)
SODIUM SERPL-SCNC: 135 MMOL/L — SIGNIFICANT CHANGE UP (ref 135–145)
SP GR SPEC: 1 — LOW (ref 1.01–1.05)
UROBILINOGEN FLD QL: SIGNIFICANT CHANGE UP
WBC # BLD: 12.41 K/UL — HIGH (ref 3.8–10.5)
WBC # FLD AUTO: 12.41 K/UL — HIGH (ref 3.8–10.5)
WBC UR QL: 7 /HPF — HIGH (ref 0–5)

## 2023-07-04 PROCEDURE — 99223 1ST HOSP IP/OBS HIGH 75: CPT

## 2023-07-04 PROCEDURE — 74176 CT ABD & PELVIS W/O CONTRAST: CPT | Mod: 26,ME

## 2023-07-04 PROCEDURE — G1004: CPT

## 2023-07-04 PROCEDURE — 99285 EMERGENCY DEPT VISIT HI MDM: CPT

## 2023-07-04 RX ORDER — ACETAMINOPHEN 500 MG
650 TABLET ORAL EVERY 6 HOURS
Refills: 0 | Status: DISCONTINUED | OUTPATIENT
Start: 2023-07-04 | End: 2023-07-09

## 2023-07-04 RX ORDER — ONDANSETRON 8 MG/1
4 TABLET, FILM COATED ORAL ONCE
Refills: 0 | Status: COMPLETED | OUTPATIENT
Start: 2023-07-04 | End: 2023-07-04

## 2023-07-04 RX ORDER — ONDANSETRON 8 MG/1
4 TABLET, FILM COATED ORAL EVERY 8 HOURS
Refills: 0 | Status: DISCONTINUED | OUTPATIENT
Start: 2023-07-04 | End: 2023-07-08

## 2023-07-04 RX ORDER — IOHEXOL 300 MG/ML
30 INJECTION, SOLUTION INTRAVENOUS ONCE
Refills: 0 | Status: COMPLETED | OUTPATIENT
Start: 2023-07-04 | End: 2023-07-04

## 2023-07-04 RX ORDER — SODIUM CHLORIDE 9 MG/ML
1000 INJECTION INTRAMUSCULAR; INTRAVENOUS; SUBCUTANEOUS ONCE
Refills: 0 | Status: COMPLETED | OUTPATIENT
Start: 2023-07-04 | End: 2023-07-04

## 2023-07-04 RX ORDER — CEFTRIAXONE 500 MG/1
1000 INJECTION, POWDER, FOR SOLUTION INTRAMUSCULAR; INTRAVENOUS ONCE
Refills: 0 | Status: COMPLETED | OUTPATIENT
Start: 2023-07-04 | End: 2023-07-04

## 2023-07-04 RX ORDER — AMPICILLIN SODIUM AND SULBACTAM SODIUM 250; 125 MG/ML; MG/ML
3 INJECTION, POWDER, FOR SUSPENSION INTRAMUSCULAR; INTRAVENOUS ONCE
Refills: 0 | Status: DISCONTINUED | OUTPATIENT
Start: 2023-07-04 | End: 2023-07-04

## 2023-07-04 RX ORDER — LANOLIN ALCOHOL/MO/W.PET/CERES
3 CREAM (GRAM) TOPICAL AT BEDTIME
Refills: 0 | Status: DISCONTINUED | OUTPATIENT
Start: 2023-07-04 | End: 2023-07-09

## 2023-07-04 RX ORDER — FAMOTIDINE 10 MG/ML
20 INJECTION INTRAVENOUS ONCE
Refills: 0 | Status: COMPLETED | OUTPATIENT
Start: 2023-07-04 | End: 2023-07-04

## 2023-07-04 RX ADMIN — IOHEXOL 30 MILLILITER(S): 300 INJECTION, SOLUTION INTRAVENOUS at 17:03

## 2023-07-04 RX ADMIN — ONDANSETRON 4 MILLIGRAM(S): 8 TABLET, FILM COATED ORAL at 17:49

## 2023-07-04 RX ADMIN — SODIUM CHLORIDE 1000 MILLILITER(S): 9 INJECTION INTRAMUSCULAR; INTRAVENOUS; SUBCUTANEOUS at 17:19

## 2023-07-04 RX ADMIN — FAMOTIDINE 20 MILLIGRAM(S): 10 INJECTION INTRAVENOUS at 17:49

## 2023-07-04 RX ADMIN — CEFTRIAXONE 100 MILLIGRAM(S): 500 INJECTION, POWDER, FOR SOLUTION INTRAMUSCULAR; INTRAVENOUS at 21:12

## 2023-07-04 NOTE — H&P ADULT - NSHPPHYSICALEXAM_GEN_ALL_CORE
PHYSICAL EXAM:      Constitutional: NAD, well-groomed, well-developed  HEENT:  EOMI, Normal Hearing  Neck: No LAD, No JVD  Back: Normal spine flexure, No CVA tenderness  Respiratory: CTAB  Cardiovascular: S1 and S2, RRR  Gastrointestinal: BS+, soft, NT/ND, + right sided nephrostomy tube   Extremities: No peripheral edema  Vascular: 2+ peripheral pulses  Neurological: A/O x 3, no focal deficits  Psychiatric: Normal mood, normal affect  Musculoskeletal: 5/5 strength b/l upper and lower extremities  Skin: No rashes

## 2023-07-04 NOTE — H&P ADULT - NSHPLABSRESULTS_GEN_ALL_CORE
9.0    12.41 )-----------( 331      ( 2023 17:15 )             27.9     07-04    135  |  95<L>  |  6<L>  ----------------------------<  99  3.4<L>   |  28  |  2.69<H>    Ca    9.3      2023 17:15    TPro  7.1  /  Alb  3.8  /  TBili  0.4  /  DBili  x   /  AST  17  /  ALT  7   /  AlkPhos  72  07-04    CAPILLARY BLOOD GLUCOSE          Urinalysis Basic - ( 2023 19:41 )    Color: Colorless / Appearance: Slightly Turbid / S.005 / pH: x  Gluc: x / Ketone: Negative  / Bili: Negative / Urobili: <2 mg/dL   Blood: x / Protein: 100 mg/dL / Nitrite: Positive   Leuk Esterase: Large / RBC: 2 /HPF / WBC 7 /HPF   Sq Epi: x / Non Sq Epi: x / Bacteria: Many      Vital Signs Last 24 Hrs  T(C): 37 (2023 23:45), Max: 37.5 (2023 17:05)  T(F): 98.6 (2023 23:45), Max: 99.5 (2023 17:05)  HR: 92 (2023 23:45) (88 - 111)  BP: 148/68 (2023 23:45) (146/69 - 174/76)  BP(mean): --  RR: 16 (2023 23:45) (16 - 99)  SpO2: 99% (2023 23:45) (97% - 100%)    Parameters below as of 2023 23:45  Patient On (Oxygen Delivery Method): room air

## 2023-07-04 NOTE — ED PROVIDER NOTE - ATTENDING APP SHARED VISIT CONTRIBUTION OF CARE
I have personally performed a face to face medical and diagnostic evaluation of the patient. I have discussed with and reviewed the RAJI's note and agree with the History, ROS, Physical Exam and MDM unless otherwise indicated. A brief summary of my personal evaluation and impression can be found below.    Sacha ESPINOZA: 83-year-old female with history of hypertension hyperlipidemia hypothyroidism pelvic mass, acute renal failure secondary to obstructive mass status post right sided nephrostomy tube, patient was recently auric however is now voiding and peeing, presents with a chief complaint of mild abdominal discomfort to the lower abdomen associated with intermittent but persisting episodes of nausea and vomiting, no fever, she is having poor appetite and decreased p.o.  Last bowel movement was 2 days ago, with subjective fever and chills no chest pain no trouble breathing no diarrhea no new lower extremity swelling and no rashes.  She reports the drainage from her nephrostomy tube is at baseline.  She gets dialysis of which she completed the session prior to ED arrival.    All other ROS negative, except as above and as per HPI and ROS section.    VITALS: Initial triage and subsequent vitals have been reviewed by me.  GEN APPEARANCE: Alert, non-toxic, well-appearing, NAD.  HEAD: Atraumatic.  EYES: PERRLa, EOMI, vision grossly intact.   NECK: Supple  CV: RRR, S1S2, no c/r/m/g. Cap refill <2 seconds. No bruits.   LUNGS: CTAB. No abnormal breath sounds.  ABDOMEN: Soft, NTND. No guarding or rebound. Right nephrostomy tube in place with what appears to be urine in the collection bag  MSK/EXT: No spinal or extremity point tenderness. No CVA ttp. Pelvis stable. No peripheral edema.  NEURO: Alert, follows commands. Weight bearing normal. Speech normal. Sensation and motor normal x4 extremities.   SKIN: Warm, dry and intact. No rash.  PSYCH: Appropriate    Plan/MDM: Exam patient arrives tachycardic otherwise vital stable nontoxic-appearing physical exam as above, DDx given history will consider bowel obstruction, versus acute infectious etiology such as urinary tract infection, plan to check basic labs urine will get CT abdomen pelvis, will attempt oral contrast as patient has recently gotten dialysis which will attempt to avoid IV contrast at this time, will give meds as needed and reassess, consider surgical consult as indicated, dispo accordingly after.

## 2023-07-04 NOTE — ED ADULT NURSE NOTE - CHIEF COMPLAINT QUOTE
Pt AOX4 and ambulatory was d/c'd from [facility] s/p nephrostomy tube placement 6/21/23, nephrostomy tube is still draining and pt has started passing urine from her bladder just in past 24 hours and states she  feels "something has shifted" in her left back/kidney area. [facility]  Pt has felt increasingly under the weather and anorexic in past few days, had chills today, temp 98.7 orally; pt had PET scan 2 days ago at [address] [place] [address]. Pt had recent Dx of mass in abdomen- biopsied as squamous cell carcinoma

## 2023-07-04 NOTE — ED PROVIDER NOTE - OBJECTIVE STATEMENT
83-year-old female history of hypertension, hyperlipidemia, hypothyroidism, pelvic mass that is confirmed squamous cell carcinoma, acute renal failure 2/2 an obstructing soft tissue mass inseparable from the cervical remnant and proximal to the UVJ now on hemodialysis (isidoro Haney, sat) with right nephrostomy tube presents to ED complaining of feeling unwell, nausea, 2 episodes of vomiting over the past week.  Patient states has been able to urinate through her urethra but still notes drainage from nephrostomy tube.  S/p PET scan that was done 2 days ago, has appointment with gynecology and oncology this Friday.  Patient endorses poor appetite, has not been eating.  Last bowel movement 2 days ago.  Endorses subjective fever.  Denies any CP, SOB, diarrhea, leg swelling.

## 2023-07-04 NOTE — ED PROVIDER NOTE - IV ALTEPLASE EXCL REL HIDDEN
Spoke with pt regarding plans for upcoming appts scheduled for 9/6 with arrival time @945 am. Instructed pt to enter the hospital on the outpatient side ([address] go to 2nd floor, and check in at radiology and register for both tests. Nothing to eat or drink after 8:00 am except sips of water with medications. TAVR protocol CT: 10:15 am  Pulmonary Function Test: 11:00 am on 3rd floor [address]    Office visit on 9/19 with Dr. Adilia Lawson    Pt verbalized understanding and contact information ([phone]) provided for any further questions.     Eliza Chowdary, Structural Heart Navigator show

## 2023-07-04 NOTE — ED ADULT NURSE NOTE - OBJECTIVE STATEMENT
Break RN: AOX4 and ambulatory was d/c'd from [facility] s/p nephrostomy tube placement 6/21/23, nephrostomy tube is still draining and pt has started passing urine from her bladder just in past 24 hours and states she  feels "something has shifted" in her left back/kidney area. Pt had dialysis treatment this morning through Right chest wall [facility]. hx: HTN, kindey mass (squamous cell). Pt denies cp, sob, n/v, headache, dizziness, fever/chills. Breathing even, unlabored. Pending US IV.

## 2023-07-04 NOTE — H&P ADULT - PROBLEM SELECTOR PLAN 1
-c/w ceftriaxone, mild hydration; + for sepsis criteria  -follow cultures -c/w ceftriaxone, mild hydration; + for sepsis criteria; dose for complicated UTI given rigors and generalized weakness   -follow cultures

## 2023-07-04 NOTE — ED PROVIDER NOTE - NS ED ATTENDING STATEMENT MOD
This was a shared visit with the RAJI. I reviewed and verified the documentation and independently performed the documented:

## 2023-07-04 NOTE — H&P ADULT - PROBLEM SELECTOR PLAN 7
Also reporting intermittent dry cough over last 4 weeks; lower chest of abd + BL atelectasis   trial Inc Spir

## 2023-07-04 NOTE — H&P ADULT - NSHPREVIEWOFSYSTEMS_GEN_ALL_CORE
Review of Systems:   CONSTITUTIONAL: No fever, weight loss,; +fatigue, rigors  EYES: No eye pain, visual disturbances, or discharge  ENMT:  No difficulty hearing, tinnitus, vertigo; No sinus or throat pain  NECK: No pain or stiffness  RESPIRATORY: mild dry  cough  CARDIOVASCULAR: No chest pain, palpitations, dizziness, or leg swelling  GASTROINTESTINAL: No abdominal or epigastric pain. No nausea, vomiting, or hematemesis; No diarrhea or constipation. No melena or hematochezia.  GENITOURINARY: No dysuria, frequency, hematuria, or incontinence  NEUROLOGICAL: No headaches, memory loss, loss of strength, numbness, or tremors  SKIN: No itching, burning, rashes, or lesions   LYMPH NODES: No enlarged glands  ENDOCRINE: No heat or cold intolerance; No hair loss  MUSCULOSKELETAL: No joint pain or swelling; No muscle, back, or extremity pain  PSYCHIATRIC: No depression, anxiety, mood swings, or difficulty sleeping  HEME/LYMPH: No easy bruising, or bleeding gums  ALLERY AND IMMUNOLOGIC: No hives or eczema

## 2023-07-04 NOTE — ED PROVIDER NOTE - CLINICAL SUMMARY MEDICAL DECISION MAKING FREE TEXT BOX
83-year-old female history of hypertension, hyperlipidemia, hypothyroidism, pelvic mass that is confirmed squamous cell carcinoma, acute renal failure 2/2 an obstructing soft tissue mass inseparable from the cervical remnant and proximal to the UVJ now on hemodialysis (isidoro Haney, sat) with right nephrostomy tube presents to ED complaining of feeling unwell, nausea, 2 episodes of vomiting over the past week.  Patient states has been able to urinate through her urethra but still notes drainage from nephrostomy tube.  S/p PET scan that was done 2 days ago, has appointment with gynecology and oncology this Friday.  Patient endorses poor appetite, has not been eating.  Last bowel movement 2 days ago.  Endorses subjective fever.  Denies any CP, SOB, diarrhea, leg swelling. Pt had HD today.  Given history will r/o bowel obstruction, electrolyte abnormalities, UTI.  check labs, ct a/p

## 2023-07-04 NOTE — H&P ADULT - HISTORY OF PRESENT ILLNESS
84 yo f with recently discovered pelvic mass causing obstructive uropathy, and renal failure, now with nephrostomy tube and dialysis t/th/sa. Reports new-onset generalized weakness, and rigors. Denies change to nephrostomy  volume output, or fluid appearance. Renal functio  in geea has been improvomg and has begun coiding scant amouts of urtine in last week. Denies  82 yo f with recently discovered pelvic mass causing obstructive uropathy, and renal failure, now with nephrostomy tube and dialysis t/th/sa. Reports new-onset generalized weakness, and rigors. Denies change to nephrostomy  volume output, or fluid appearance. Renal function  in general has been improving and has begun voiding scant amounts of urine in last week. Denies pain on urination but UA +. Planned for outpt Onc and GYN evals later this week 84 yo f with h/o htn, hypothyroid,  recently discovered pelvic mass causing obstructive uropathy, and renal failure, now with nephrostomy tube and dialysis t/th/sa. Reports new-onset generalized weakness, and rigors. Denies change to nephrostomy  volume output, or fluid appearance. Renal function  in general has been improving and has begun voiding scant amounts of urine in last week. Denies pain on urination but UA +. Planned for outpt Onc and GYN evals later this week  Also reporting intermittent dry cough over last 4 weeks; lower chest of abd + BL atelectasis

## 2023-07-04 NOTE — ED ADULT NURSE REASSESSMENT NOTE - NS ED NURSE REASSESS COMMENT FT1
Break RN: Report given to ZHENG Gordillo. Pt a&ox4, denying cp, sob, n/v, headache, dizziness, fever/chills. breathing even, unlabored. Pending transport.
Patient received stable from day shift nurse. Respirations even and unlabored. Denies any pain. Awaiting disposition. Safety precautions in place.

## 2023-07-04 NOTE — ED ADULT TRIAGE NOTE - CHIEF COMPLAINT QUOTE
Pt AOX4 and ambulatory was d/c'd from [facility] s/p nephrostomy tube placement 6/21/23, nephrostomy tube is still draining and pt has started passing urine from her bladder just in past 24 hours and states she  feels "something has shifted" in her left back/kidney area. [facility];  Pt has felt increasingly under the weather and anorexic in past few days, had chills today, temp 98.7 orally; pt had PET scan 2 days ago at [address] [place] [address]. Pt had recent Dx of mass in abdomen- biopsied as squamous cell carcinoma

## 2023-07-05 DIAGNOSIS — N18.9 CHRONIC KIDNEY DISEASE, UNSPECIFIED: ICD-10-CM

## 2023-07-05 DIAGNOSIS — R05.9 COUGH, UNSPECIFIED: ICD-10-CM

## 2023-07-05 DIAGNOSIS — N39.0 URINARY TRACT INFECTION, SITE NOT SPECIFIED: ICD-10-CM

## 2023-07-05 DIAGNOSIS — R13.10 DYSPHAGIA, UNSPECIFIED: ICD-10-CM

## 2023-07-05 DIAGNOSIS — E03.9 HYPOTHYROIDISM, UNSPECIFIED: ICD-10-CM

## 2023-07-05 DIAGNOSIS — Z79.899 OTHER LONG TERM (CURRENT) DRUG THERAPY: ICD-10-CM

## 2023-07-05 DIAGNOSIS — N28.9 DISORDER OF KIDNEY AND URETER, UNSPECIFIED: ICD-10-CM

## 2023-07-05 DIAGNOSIS — Z29.9 ENCOUNTER FOR PROPHYLACTIC MEASURES, UNSPECIFIED: ICD-10-CM

## 2023-07-05 DIAGNOSIS — I10 ESSENTIAL (PRIMARY) HYPERTENSION: ICD-10-CM

## 2023-07-05 LAB
ALBUMIN SERPL ELPH-MCNC: 3.2 G/DL — LOW (ref 3.3–5)
ALP SERPL-CCNC: 62 U/L — SIGNIFICANT CHANGE UP (ref 40–120)
ALT FLD-CCNC: 7 U/L — SIGNIFICANT CHANGE UP (ref 4–33)
ANION GAP SERPL CALC-SCNC: 13 MMOL/L — SIGNIFICANT CHANGE UP (ref 7–14)
APPEARANCE UR: ABNORMAL
AST SERPL-CCNC: 18 U/L — SIGNIFICANT CHANGE UP (ref 4–32)
B PERT DNA SPEC QL NAA+PROBE: SIGNIFICANT CHANGE UP
B PERT+PARAPERT DNA PNL SPEC NAA+PROBE: SIGNIFICANT CHANGE UP
BACTERIA # UR AUTO: ABNORMAL /HPF
BASOPHILS # BLD AUTO: 0.06 K/UL — SIGNIFICANT CHANGE UP (ref 0–0.2)
BASOPHILS NFR BLD AUTO: 0.5 % — SIGNIFICANT CHANGE UP (ref 0–2)
BILIRUB SERPL-MCNC: 0.3 MG/DL — SIGNIFICANT CHANGE UP (ref 0.2–1.2)
BILIRUB UR-MCNC: NEGATIVE — SIGNIFICANT CHANGE UP
BORDETELLA PARAPERTUSSIS (RAPRVP): SIGNIFICANT CHANGE UP
BUN SERPL-MCNC: 10 MG/DL — SIGNIFICANT CHANGE UP (ref 7–23)
C PNEUM DNA SPEC QL NAA+PROBE: SIGNIFICANT CHANGE UP
CALCIUM SERPL-MCNC: 8.6 MG/DL — SIGNIFICANT CHANGE UP (ref 8.4–10.5)
CAST: 18 /LPF — HIGH (ref 0–4)
CHLORIDE SERPL-SCNC: 97 MMOL/L — LOW (ref 98–107)
CO2 SERPL-SCNC: 25 MMOL/L — SIGNIFICANT CHANGE UP (ref 22–31)
COLOR SPEC: YELLOW — SIGNIFICANT CHANGE UP
CREAT SERPL-MCNC: 4.29 MG/DL — HIGH (ref 0.5–1.3)
CULTURE RESULTS: SIGNIFICANT CHANGE UP
DIFF PNL FLD: ABNORMAL
EGFR: 10 ML/MIN/1.73M2 — LOW
EOSINOPHIL # BLD AUTO: 0.13 K/UL — SIGNIFICANT CHANGE UP (ref 0–0.5)
EOSINOPHIL NFR BLD AUTO: 1.1 % — SIGNIFICANT CHANGE UP (ref 0–6)
FLUAV SUBTYP SPEC NAA+PROBE: SIGNIFICANT CHANGE UP
FLUBV RNA SPEC QL NAA+PROBE: SIGNIFICANT CHANGE UP
GLUCOSE SERPL-MCNC: 97 MG/DL — SIGNIFICANT CHANGE UP (ref 70–99)
GLUCOSE UR QL: NEGATIVE MG/DL — SIGNIFICANT CHANGE UP
HADV DNA SPEC QL NAA+PROBE: SIGNIFICANT CHANGE UP
HCOV 229E RNA SPEC QL NAA+PROBE: SIGNIFICANT CHANGE UP
HCOV HKU1 RNA SPEC QL NAA+PROBE: SIGNIFICANT CHANGE UP
HCOV NL63 RNA SPEC QL NAA+PROBE: SIGNIFICANT CHANGE UP
HCOV OC43 RNA SPEC QL NAA+PROBE: SIGNIFICANT CHANGE UP
HCT VFR BLD CALC: 25.4 % — LOW (ref 34.5–45)
HGB BLD-MCNC: 8.3 G/DL — LOW (ref 11.5–15.5)
HMPV RNA SPEC QL NAA+PROBE: SIGNIFICANT CHANGE UP
HPIV1 RNA SPEC QL NAA+PROBE: SIGNIFICANT CHANGE UP
HPIV2 RNA SPEC QL NAA+PROBE: SIGNIFICANT CHANGE UP
HPIV3 RNA SPEC QL NAA+PROBE: SIGNIFICANT CHANGE UP
HPIV4 RNA SPEC QL NAA+PROBE: SIGNIFICANT CHANGE UP
IANC: 8.52 K/UL — HIGH (ref 1.8–7.4)
IMM GRANULOCYTES NFR BLD AUTO: 0.8 % — SIGNIFICANT CHANGE UP (ref 0–0.9)
KETONES UR-MCNC: NEGATIVE MG/DL — SIGNIFICANT CHANGE UP
LEUKOCYTE ESTERASE UR-ACNC: ABNORMAL
LYMPHOCYTES # BLD AUTO: 1.72 K/UL — SIGNIFICANT CHANGE UP (ref 1–3.3)
LYMPHOCYTES # BLD AUTO: 14.4 % — SIGNIFICANT CHANGE UP (ref 13–44)
M PNEUMO DNA SPEC QL NAA+PROBE: SIGNIFICANT CHANGE UP
MCHC RBC-ENTMCNC: 30.2 PG — SIGNIFICANT CHANGE UP (ref 27–34)
MCHC RBC-ENTMCNC: 32.7 GM/DL — SIGNIFICANT CHANGE UP (ref 32–36)
MCV RBC AUTO: 92.4 FL — SIGNIFICANT CHANGE UP (ref 80–100)
MONOCYTES # BLD AUTO: 1.41 K/UL — HIGH (ref 0–0.9)
MONOCYTES NFR BLD AUTO: 11.8 % — SIGNIFICANT CHANGE UP (ref 2–14)
NEUTROPHILS # BLD AUTO: 8.52 K/UL — HIGH (ref 1.8–7.4)
NEUTROPHILS NFR BLD AUTO: 71.4 % — SIGNIFICANT CHANGE UP (ref 43–77)
NITRITE UR-MCNC: NEGATIVE — SIGNIFICANT CHANGE UP
NRBC # BLD: 0 /100 WBCS — SIGNIFICANT CHANGE UP (ref 0–0)
NRBC # FLD: 0 K/UL — SIGNIFICANT CHANGE UP (ref 0–0)
PH UR: 6 — SIGNIFICANT CHANGE UP (ref 5–8)
PLATELET # BLD AUTO: 272 K/UL — SIGNIFICANT CHANGE UP (ref 150–400)
POTASSIUM SERPL-MCNC: 3.5 MMOL/L — SIGNIFICANT CHANGE UP (ref 3.5–5.3)
POTASSIUM SERPL-SCNC: 3.5 MMOL/L — SIGNIFICANT CHANGE UP (ref 3.5–5.3)
PROT SERPL-MCNC: 6 G/DL — SIGNIFICANT CHANGE UP (ref 6–8.3)
PROT UR-MCNC: 300 MG/DL
RAPID RVP RESULT: SIGNIFICANT CHANGE UP
RBC # BLD: 2.75 M/UL — LOW (ref 3.8–5.2)
RBC # FLD: 13 % — SIGNIFICANT CHANGE UP (ref 10.3–14.5)
RBC CASTS # UR COMP ASSIST: 29 /HPF — HIGH (ref 0–4)
REVIEW: SIGNIFICANT CHANGE UP
RSV RNA SPEC QL NAA+PROBE: SIGNIFICANT CHANGE UP
RV+EV RNA SPEC QL NAA+PROBE: SIGNIFICANT CHANGE UP
SARS-COV-2 RNA SPEC QL NAA+PROBE: SIGNIFICANT CHANGE UP
SODIUM SERPL-SCNC: 135 MMOL/L — SIGNIFICANT CHANGE UP (ref 135–145)
SP GR SPEC: 1.01 — SIGNIFICANT CHANGE UP (ref 1–1.03)
SPECIMEN SOURCE: SIGNIFICANT CHANGE UP
SQUAMOUS # UR AUTO: 42 /HPF — HIGH (ref 0–5)
UROBILINOGEN FLD QL: 0.2 MG/DL — SIGNIFICANT CHANGE UP (ref 0.2–1)
WBC # BLD: 11.94 K/UL — HIGH (ref 3.8–10.5)
WBC # FLD AUTO: 11.94 K/UL — HIGH (ref 3.8–10.5)
WBC UR QL: 814 /HPF — HIGH (ref 0–5)

## 2023-07-05 RX ORDER — ATORVASTATIN CALCIUM 80 MG/1
10 TABLET, FILM COATED ORAL AT BEDTIME
Refills: 0 | Status: DISCONTINUED | OUTPATIENT
Start: 2023-07-05 | End: 2023-07-09

## 2023-07-05 RX ORDER — LEVOTHYROXINE SODIUM 125 MCG
1 TABLET ORAL
Refills: 0 | DISCHARGE

## 2023-07-05 RX ORDER — PANTOPRAZOLE SODIUM 20 MG/1
40 TABLET, DELAYED RELEASE ORAL EVERY 12 HOURS
Refills: 0 | Status: DISCONTINUED | OUTPATIENT
Start: 2023-07-05 | End: 2023-07-09

## 2023-07-05 RX ORDER — HEPARIN SODIUM 5000 [USP'U]/ML
5000 INJECTION INTRAVENOUS; SUBCUTANEOUS EVERY 12 HOURS
Refills: 0 | Status: DISCONTINUED | OUTPATIENT
Start: 2023-07-05 | End: 2023-07-09

## 2023-07-05 RX ORDER — ONDANSETRON 8 MG/1
4 TABLET, FILM COATED ORAL ONCE
Refills: 0 | Status: COMPLETED | OUTPATIENT
Start: 2023-07-05 | End: 2023-07-05

## 2023-07-05 RX ORDER — CEFTRIAXONE 500 MG/1
1000 INJECTION, POWDER, FOR SOLUTION INTRAMUSCULAR; INTRAVENOUS EVERY 24 HOURS
Refills: 0 | Status: DISCONTINUED | OUTPATIENT
Start: 2023-07-05 | End: 2023-07-05

## 2023-07-05 RX ORDER — POLYETHYLENE GLYCOL 3350 17 G/17G
17 POWDER, FOR SOLUTION ORAL DAILY
Refills: 0 | Status: DISCONTINUED | OUTPATIENT
Start: 2023-07-05 | End: 2023-07-09

## 2023-07-05 RX ORDER — CEFTRIAXONE 500 MG/1
1000 INJECTION, POWDER, FOR SOLUTION INTRAMUSCULAR; INTRAVENOUS EVERY 24 HOURS
Refills: 0 | Status: DISCONTINUED | OUTPATIENT
Start: 2023-07-05 | End: 2023-07-08

## 2023-07-05 RX ORDER — AMLODIPINE BESYLATE 2.5 MG/1
5 TABLET ORAL DAILY
Refills: 0 | Status: DISCONTINUED | OUTPATIENT
Start: 2023-07-05 | End: 2023-07-09

## 2023-07-05 RX ORDER — PREGABALIN 225 MG/1
1000 CAPSULE ORAL DAILY
Refills: 0 | Status: DISCONTINUED | OUTPATIENT
Start: 2023-07-05 | End: 2023-07-09

## 2023-07-05 RX ORDER — CHLORHEXIDINE GLUCONATE 213 G/1000ML
1 SOLUTION TOPICAL DAILY
Refills: 0 | Status: DISCONTINUED | OUTPATIENT
Start: 2023-07-05 | End: 2023-07-09

## 2023-07-05 RX ADMIN — PREGABALIN 1000 MICROGRAM(S): 225 CAPSULE ORAL at 11:45

## 2023-07-05 RX ADMIN — ONDANSETRON 4 MILLIGRAM(S): 8 TABLET, FILM COATED ORAL at 17:20

## 2023-07-05 RX ADMIN — PANTOPRAZOLE SODIUM 40 MILLIGRAM(S): 20 TABLET, DELAYED RELEASE ORAL at 17:20

## 2023-07-05 RX ADMIN — CHLORHEXIDINE GLUCONATE 1 APPLICATION(S): 213 SOLUTION TOPICAL at 11:43

## 2023-07-05 RX ADMIN — CEFTRIAXONE 100 MILLIGRAM(S): 500 INJECTION, POWDER, FOR SOLUTION INTRAMUSCULAR; INTRAVENOUS at 21:18

## 2023-07-05 RX ADMIN — HEPARIN SODIUM 5000 UNIT(S): 5000 INJECTION INTRAVENOUS; SUBCUTANEOUS at 05:58

## 2023-07-05 RX ADMIN — ATORVASTATIN CALCIUM 10 MILLIGRAM(S): 80 TABLET, FILM COATED ORAL at 21:17

## 2023-07-05 RX ADMIN — HEPARIN SODIUM 5000 UNIT(S): 5000 INJECTION INTRAVENOUS; SUBCUTANEOUS at 17:20

## 2023-07-05 RX ADMIN — Medication 3 MILLIGRAM(S): at 21:17

## 2023-07-05 RX ADMIN — PANTOPRAZOLE SODIUM 40 MILLIGRAM(S): 20 TABLET, DELAYED RELEASE ORAL at 05:58

## 2023-07-05 RX ADMIN — ONDANSETRON 4 MILLIGRAM(S): 8 TABLET, FILM COATED ORAL at 20:26

## 2023-07-05 RX ADMIN — AMLODIPINE BESYLATE 5 MILLIGRAM(S): 2.5 TABLET ORAL at 05:58

## 2023-07-05 NOTE — CONSULT NOTE ADULT - ASSESSMENT
84 y/o F PMhx HTN, hypothyroidism, hydronephrosis c/b renal failure 2/2 obstructive uropathy 2/2 recently discovered pelvic mass s/p R nephrostomy tube 6/8 and HD via R tunneled permcath who presented w/ generalized weakness, rigors, nausea, poor PO intake.     leukocytosis, generalized malaise, r/o UTI  CT abd/pelvis- No bowel obstruction. Resolution of R-sided hydroureteronephrosis s/p R nephrostomy tube placement. Unchanged mild left hydroureteronephrosis. Redemonstration of mass emanating from the vaginal cuff   Recommendations  UA contaminated given elevated number of epithelial cells  would obtain another urine sample clean catch if feasible  nephrostomy tube has been present since 6/8, would avoid obtaining urine from nephrostomy tube as any organism would likely reflect colonization  c/w ceftriaxone for now  f/u blood cultures  trend temps, wbc    Shree Newton M.D.  [facility], Division of Infectious Diseases  [phone]  After 5pm on weekdays and all day on weekends - please call [phone]

## 2023-07-05 NOTE — PROGRESS NOTE ADULT - PROBLEM SELECTOR PLAN 7
Also reporting intermittent dry cough over last 4 weeks; lower chest of abd + BL atelectasis   trial Inc Spir DVT: HSQ  Diet: Liquid pending S&S/esophagram  Dispo: Pending PT eval

## 2023-07-05 NOTE — PROGRESS NOTE ADULT - PROBLEM SELECTOR PLAN 3
Pt reportedly had synthroid stopped last week by pcp. Will need more collateral  - f/u TSH  - collateral about synthroid Pt reports dysphagia to solids not liquids. Reports sensation of food stuck in lower esophagus/chest w/ regurgitation ongoing intermittently for a few weeks/months  - Per pt request will order liquid diet  - S&S evaluation   - Esophagram

## 2023-07-05 NOTE — PROGRESS NOTE ADULT - PROBLEM SELECTOR PLAN 2
Pt w/ renal failure due to obstrtuctive uropathy iso pelvic mass now s/p nephrostomy and started on HD during last hospitalization on a Tu/Th/Sa schedule  - Nephro consult for HD  - c/t monitor urine output and nephrostomy site  - Monitor lytes

## 2023-07-05 NOTE — PROGRESS NOTE ADULT - PROBLEM SELECTOR PLAN 4
Pt w/ HTN  - c/w home norvasc w/ hold parameters Pt reportedly had synthroid stopped last week by pcp. Will need more collateral  - f/u TSH  - collateral about synthroid

## 2023-07-05 NOTE — PROGRESS NOTE ADULT - ASSESSMENT
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 Reports new-onset generalized weakness, and rigors w/ U/a that appears possibly contaminated.

## 2023-07-05 NOTE — PHYSICAL THERAPY INITIAL EVALUATION ADULT - PERTINENT HX OF CURRENT PROBLEM, REHAB EVAL
82 yo f with h/o htn, hypothyroid,  recently discovered pelvic mass causing obstructive uropathy, and renal failure, now with nephrostomy tube and dialysis t/th/sa. Reports new-onset generalized weakness, and rigors. Denies change to nephrostomy  volume output, or fluid appearance. Renal function  in general has been improving and has begun voiding scant amounts of urine in last week.

## 2023-07-05 NOTE — PHYSICAL THERAPY INITIAL EVALUATION ADULT - LEVEL OF INDEPENDENCE: STAIR NEGOTIATION, REHAB EVAL
Pt deferred at this time 2/2 feeling tired. Pt states she hasn't had a real meal yet, and wants to wait until she is more energized.

## 2023-07-05 NOTE — CONSULT NOTE ADULT - SUBJECTIVE AND OBJECTIVE BOX
[facility], Division of Infectious Diseases  JARED Marte S. Shah, Y. Patel, G. Aman  [phone]  ([phone] - weekdays after 5pm and weekends)    BATSHEVA HOBBS  83y, Female  [id number]    HPI--  HPI:  84 y/o F PMhx HTN, hypothyroidism, hydronephrosis c/b renal failure 2/2 obstructive uropathy 2/2 recently discovered pelvic mass s/p R nephrostomy tube 6/8 and HD via R tunneled permcath who presented w/ generalized weakness, rigors, nausea, poor PO intake. She reports generalized weakness/ fatigue since yesterday. Daughter states pt has had low grade temps w/ Tmax 100 yesterday which prompted her to bring patient to ED. Additionally, patient has been having had a dry cough since her last hospitalization last month. Also nausea for about 1 week. This week has noted ability to urinate a small amount of urine a few days this week.  They deny fever, SOB, chest pain, abd pain, myalgias, arthralgias, dysuria, back pain.    ROS: 10 point review of systems completed, pertinent positives and negatives as per HPI.    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 No LAD  Lungs: Clear bilaterally without rales  Heart: S1, S2, normal rate.   Abdomen: Soft. Nondistended. Nontender. no suprapubic tenderness  Neuro: AAOx3, no obvious focal deficits   Back: No costovertebral angle tenderness. R nephrostomy tube, no surrounding erythema or tenderness  Extremities: No LE edema.   Skin: Warm. Dry. No rash.  Psychiatric: Appropriate affect and mood for situation.   Lines: PIV    Laboratory & Imaging Data--  CBC:                       8.3    11.94 )-----------( 272      ( 05 Jul 2023 06:25 )             25.4     WBC Count: 11.94 K/uL (07-05-23 @ 06:25)  WBC Count: 12.41 K/uL (07-04-23 @ 17:15)    CMP: 07-05    135  |  97<L>  |  10  ----------------------------<  97  3.5   |  25  |  4.29<H>    Ca    8.6      05 Jul 2023 06:25    TPro  6.0  /  Alb  3.2<L>  /  TBili  0.3  /  DBili  x   /  AST  18  /  ALT  7   /  AlkPhos  62  07-05    LIVER FUNCTIONS - ( 05 Jul 2023 06:25 )  Alb: 3.2 g/dL / Pro: 6.0 g/dL / ALK PHOS: 62 U/L / ALT: 7 U/L / AST: 18 U/L / GGT: x           Urinalysis Basic - ( 05 Jul 2023 06:25 )    Color: x / Appearance: x / SG: x / pH: x  Gluc: 97 mg/dL / Ketone: x  / Bili: x / Urobili: x   Blood: x / Protein: x / Nitrite: x   Leuk Esterase: x / RBC: x / WBC x   Sq Epi: x / Non Sq Epi: x / Bacteria: x      Microbiology:       Radiology--  ***  Active Medications--  acetaminophen     Tablet .. 650 milliGRAM(s) Oral every 6 hours PRN  amLODIPine   Tablet 5 milliGRAM(s) Oral daily  atorvastatin 10 milliGRAM(s) Oral at bedtime  calcium carbonate 1250 mG  + Vitamin D (OsCal 500 + D) 1 Tablet(s) Oral daily  cefTRIAXone   IVPB 1000 milliGRAM(s) IV Intermittent every 24 hours  chlorhexidine 2% Cloths 1 Application(s) Topical daily  cyanocobalamin 1000 MICROGram(s) Oral daily  heparin   Injectable 5000 Unit(s) SubCutaneous every 12 hours  melatonin 3 milliGRAM(s) Oral at bedtime PRN  ondansetron Injectable 4 milliGRAM(s) IV Push every 8 hours PRN  pantoprazole    Tablet 40 milliGRAM(s) Oral every 12 hours    Antimicrobials:   cefTRIAXone   IVPB 1000 milliGRAM(s) IV Intermittent every 24 hours    Immunologic:    [facility], Division of Infectious Diseases  JARED Marte S. Shah, Y. Patel, G. [facility]  [phone]  ([phone] - weekdays after 5pm and weekends)    BATSHEVA HOBBS  83y, Female  [id number]    HPI--  HPI:  82 y/o F PMhx HTN, hypothyroidism, hydronephrosis c/b renal failure 2/2 obstructive uropathy 2/2 recently discovered pelvic mass s/p R nephrostomy tube 6/8 and HD via R tunneled permcath who presented w/ generalized weakness, rigors, nausea, poor PO intake. She reports generalized weakness/ fatigue since yesterday. Daughter states pt has had low grade temps w/ Tmax 100 yesterday which prompted her to bring patient to ED. Additionally, patient has been having had a dry cough since her last hospitalization last month. Also nausea and lack of taste leading to poor PO intake for about 1 week. This week has noted ability to urinate a small amount of urine a few days this week.  They deny fever, SOB, chest pain, abd pain, myalgias, arthralgias, dysuria, back pain.    ROS: 10 point review of systems completed, pertinent positives and negatives as per HPI.    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 No LAD  Lungs: Clear bilaterally without rales  Heart: S1, S2, normal rate.   Abdomen: Soft. Nondistended. Nontender. no suprapubic tenderness  Neuro: AAOx3, no obvious focal deficits   Back: No costovertebral angle tenderness. R nephrostomy tube, no surrounding erythema or tenderness  Extremities: No LE edema.   Skin: Warm. Dry. No rash.  Psychiatric: Appropriate affect and mood for situation.   Lines: PIV    Laboratory & Imaging Data--  CBC:                       8.3    11.94 )-----------( 272      ( 05 Jul 2023 06:25 )             25.4     WBC Count: 11.94 K/uL (07-05-23 @ 06:25)  WBC Count: 12.41 K/uL (07-04-23 @ 17:15)    CMP: 07-05    135  |  97<L>  |  10  ----------------------------<  97  3.5   |  25  |  4.29<H>    Ca    8.6      05 Jul 2023 06:25    TPro  6.0  /  Alb  3.2<L>  /  TBili  0.3  /  DBili  x   /  AST  18  /  ALT  7   /  AlkPhos  62  07-05    LIVER FUNCTIONS - ( 05 Jul 2023 06:25 )  Alb: 3.2 g/dL / Pro: 6.0 g/dL / ALK PHOS: 62 U/L / ALT: 7 U/L / AST: 18 U/L / GGT: x           Urinalysis Basic - ( 05 Jul 2023 06:25 )    Color: x / Appearance: x / SG: x / pH: x  Gluc: 97 mg/dL / Ketone: x  / Bili: x / Urobili: x   Blood: x / Protein: x / Nitrite: x   Leuk Esterase: x / RBC: x / WBC x   Sq Epi: x / Non Sq Epi: x / Bacteria: x      Microbiology:       Radiology--  ***  Active Medications--  acetaminophen     Tablet .. 650 milliGRAM(s) Oral every 6 hours PRN  amLODIPine   Tablet 5 milliGRAM(s) Oral daily  atorvastatin 10 milliGRAM(s) Oral at bedtime  calcium carbonate 1250 mG  + Vitamin D (OsCal 500 + D) 1 Tablet(s) Oral daily  cefTRIAXone   IVPB 1000 milliGRAM(s) IV Intermittent every 24 hours  chlorhexidine 2% Cloths 1 Application(s) Topical daily  cyanocobalamin 1000 MICROGram(s) Oral daily  heparin   Injectable 5000 Unit(s) SubCutaneous every 12 hours  melatonin 3 milliGRAM(s) Oral at bedtime PRN  ondansetron Injectable 4 milliGRAM(s) IV Push every 8 hours PRN  pantoprazole    Tablet 40 milliGRAM(s) Oral every 12 hours    Antimicrobials:   cefTRIAXone   IVPB 1000 milliGRAM(s) IV Intermittent every 24 hours    Immunologic:

## 2023-07-05 NOTE — PHYSICAL THERAPY INITIAL EVALUATION ADULT - ADDITIONAL COMMENTS
Pt sates she lives with her daughter and grandson in a house with 3 steps to enter and a flight of steps to the bedroom. Prior to admission, pt was ambulating independently without any assistive devices.   Post PT evaluation, pt left semi-supine, alarm on, call bell and remote within reach, all precautions maintained, NAD. RN aware.

## 2023-07-05 NOTE — PROGRESS NOTE ADULT - PROBLEM SELECTOR PLAN 6
sc heparin  diet: Liq Also reporting intermittent dry cough over last 4 weeks; lower chest of abd + BL atelectasis   trial Inc Spir  - Check RVP; possible etiology of rigors/weaknes

## 2023-07-05 NOTE — PROGRESS NOTE ADULT - SUBJECTIVE AND OBJECTIVE BOX
Harshad Joshi, PGY1    BATSHEVA HOBBS  83y  Female    Complaints:  Subjective:     No acute o/n events. Pt denies subj fevers/chills, HA, dizziness, chest pain, palpitations, n/v, abd pain, dysuria, diarrhea      FAMILY HISTORY:  No pertinent family history in first degree relatives      83yVital Signs Last 24 Hrs  T(C): 36.9 (05 Jul 2023 05:40), Max: 37.5 (04 Jul 2023 17:05)  T(F): 98.5 (05 Jul 2023 05:40), Max: 99.5 (04 Jul 2023 17:05)  HR: 94 (05 Jul 2023 05:40) (88 - 111)  BP: 145/62 (05 Jul 2023 05:40) (145/62 - 174/76)  BP(mean): --  RR: 16 (05 Jul 2023 05:40) (16 - 99)  SpO2: 97% (05 Jul 2023 05:40) (97% - 100%)    Parameters below as of 05 Jul 2023 05:40  Patient On (Oxygen Delivery Method): room air          PHYSICAL EXAM:  GENERAL: NAD, well-groomed, well-developed  HEAD:  Atraumatic, Normocephalic  EYES: EOMI, PERRLA, conjunctiva and sclera clear  ENMT: No tonsillar erythema, exudates, or enlargement; Moist mucous membranes, Good dentition, No lesions  NECK: Supple, No JVD, Normal thyroid  NERVOUS SYSTEM:  Alert & Oriented X3, Good concentration; Motor Strength 5/5 B/L upper and lower extremities; DTRs 2+ intact and symmetric  CHEST/LUNG: Clear to percussion bilaterally; No rales, rhonchi, wheezing, or rubs  HEART: Regular rate and rhythm; No murmurs, rubs, or gallops  ABDOMEN: Soft, Nontender, Nondistended; Bowel sounds present  EXTREMITIES:  2+ Peripheral Pulses, No clubbing, cyanosis, or edema  LYMPH: No lymphadenopathy noted  SKIN: No rashes or lesions      Consultant(s) Notes Reviewed:  [x ] YES  [ ] NO  Care Discussed with Consultants/Other Providers [ x] YES  [ ] NO    LABS:                Female  RADIOLOGY & ADDITIONAL TESTS:    Imaging Personally Reviewed:  [ ] YES  [ ] NO    MedsMEDICATIONS  (STANDING):  amLODIPine   Tablet 5 milliGRAM(s) Oral daily  atorvastatin 10 milliGRAM(s) Oral at bedtime  calcium carbonate 1250 mG  + Vitamin D (OsCal 500 + D) 1 Tablet(s) Oral daily  cefTRIAXone   IVPB 1000 milliGRAM(s) IV Intermittent every 24 hours  chlorhexidine 2% Cloths 1 Application(s) Topical daily  cyanocobalamin 1000 MICROGram(s) Oral daily  heparin   Injectable 5000 Unit(s) SubCutaneous every 12 hours  pantoprazole    Tablet 40 milliGRAM(s) Oral every 12 hours    MEDICATIONS  (PRN):  acetaminophen     Tablet .. 650 milliGRAM(s) Oral every 6 hours PRN Temp greater or equal to 38C (100.4F), Mild Pain (1 - 3), Moderate Pain (4 - 6)  melatonin 3 milliGRAM(s) Oral at bedtime PRN Insomnia  ondansetron Injectable 4 milliGRAM(s) IV Push every 8 hours PRN Nausea and/or Vomiting      HEALTH ISSUES - PROBLEM Dx:  Acute UTI    Renal insufficiency    Hypothyroid    HTN (hypertension)    Medication management    Encounter for deep vein thrombosis (DVT) prophylaxis    Cough                   Harshad Joshi, PGY1    BATSHEVA HOBBS  83y  Female    Complaints:  Subjective:     No acute o/n events. Pt denies subj fevers/chills, HA, dizziness, chest pain, palpitations, n/v, abd pain, dysuria, diarrhea      FAMILY HISTORY:  No pertinent family history in first degree relatives      83yVital Signs Last 24 Hrs  T(C): 36.9 (05 Jul 2023 05:40), Max: 37.5 (04 Jul 2023 17:05)  T(F): 98.5 (05 Jul 2023 05:40), Max: 99.5 (04 Jul 2023 17:05)  HR: 94 (05 Jul 2023 05:40) (88 - 111)  BP: 145/62 (05 Jul 2023 05:40) (145/62 - 174/76)  BP(mean): --  RR: 16 (05 Jul 2023 05:40) (16 - 99)  SpO2: 97% (05 Jul 2023 05:40) (97% - 100%)    Parameters below as of 05 Jul 2023 05:40  Patient On (Oxygen Delivery Method): room air          PHYSICAL EXAM:  CONSTITUTIONAL: No fever, weight loss,; +fatigue, rigors  EYES: No eye pain, visual disturbances, or discharge  ENMT:  No difficulty hearing, tinnitus, vertigo; No sinus or throat pain  NECK: No pain or stiffness  RESPIRATORY: mild dry  cough  CARDIOVASCULAR: No chest pain, palpitations, dizziness, or leg swelling  GASTROINTESTINAL: No abdominal or epigastric pain. No nausea, vomiting, or hematemesis; No diarrhea or constipation. No melena or hematochezia.  GENITOURINARY: No dysuria, frequency, hematuria, or incontinence  NEUROLOGICAL: No headaches, memory loss, loss of strength, numbness, or tremors  SKIN: No itching, burning, rashes, or lesions   LYMPH NODES: No enlarged glands  ENDOCRINE: No heat or cold intolerance; No hair loss  MUSCULOSKELETAL: No joint pain or swelling; No muscle, back, or extremity pain  PSYCHIATRIC: No depression, anxiety, mood swings, or difficulty sleeping  HEME/LYMPH: No easy bruising, or bleeding gums  ALLERY AND IMMUNOLOGIC: No hives or eczema      Consultant(s) Notes Reviewed:  [x ] YES  [ ] NO  Care Discussed with Consultants/Other Providers [ x] YES  [ ] NO    LABS:                          8.3    11.94 )-----------( 272      ( 05 Jul 2023 06:25 )             25.4       07-05    135  |  97<L>  |  10  ----------------------------<  97  3.5   |  25  |  4.29<H>    Ca    8.6      05 Jul 2023 06:25    TPro  6.0  /  Alb  3.2<L>  /  TBili  0.3  /  DBili  x   /  AST  18  /  ALT  7   /  AlkPhos  62  07-05              Urinalysis Basic - ( 05 Jul 2023 06:25 )    Color: x / Appearance: x / SG: x / pH: x  Gluc: 97 mg/dL / Ketone: x  / Bili: x / Urobili: x   Blood: x / Protein: x / Nitrite: x   Leuk Esterase: x / RBC: x / WBC x   Sq Epi: x / Non Sq Epi: x / Bacteria: x                  CAPILLARY BLOOD GLUCOSE            CT Abdomen and Pelvis No Cont:   ACC: [id number] EXAM:  CT ABDOMEN AND PELVIS   ORDERED BY: AUDRA BHAGAT     PROCEDURE DATE:  07/04/2023          INTERPRETATION:  CLINICAL INFORMATION: Vomiting, history of pelvic mass.   Evaluate for bowel obstruction    COMPARISON: CT abdomen pelvis 6/6/2023, CT chest 6/7/2023    CONTRAST/COMPLICATIONS:  IV Contrast: None  Oral Contrast: None  Complications: None reported    PROCEDURE:  CT of the Abdomen and Pelvis was performed.  Sagittal and coronal reformats were performed.    FINDINGS:  LOWER CHEST: Trace bilateral pleural effusions, decreased from prior.   Bibasilar atelectasis. 6 mm right middle lobe peripheral opacity not seen   on prior, may represent intrapulmonary lymph node versus atelectasis.   Left atrial enlargement. Aortic and coronary artery calcifications.    LIVER: Within normal limits.  BILE DUCTS: Normal caliber.  GALLBLADDER: Cholecystectomy.  SPLEEN: Within normal limits.  PANCREAS: Cystic lesion in the pancreatic body measuring 1 cm is again   seen (301-38). No pancreatic duct dilation.  ADRENALS: Within normal limits.  KIDNEYS/URETERS: Status post right nephrostomy tube placement improved   with resolution of right hydroureteronephrosis. Mild left-sided   hydroureteronephrosis to the level of the UVJ is unchanged. Left sided   perinephric edema is again seen. Left renal cyst.    BLADDER: Minimally distended.  REPRODUCTIVE ORGANS: Status post hysterectomy with soft tissue mass   arising from the right aspect of the cervical remnant measuring up to 4.6   cm.    BOWEL: No bowel obstruction. Appendix is not visualized. No evidence of   inflammation in the pericecal region. Colonic diverticulosis.  PERITONEUM: No ascites.  VESSELS: Atherosclerotic changes.  RETROPERITONEUM/LYMPH NODES: Stable left para-aortic and bilateral pelvic   lymphadenopathy, largest measuring 2.5 cm (301-95).).  ABDOMINAL WALL: Postsurgical changes.  BONES: Degenerative changes. Mild anterolisthesis of L4 on L5.    IMPRESSION:  No bowel obstruction.  Resolution of right-sided hydroureteronephrosis status post right   nephrostomy tube placement.  Unchanged mild left hydroureteronephrosis.   Redemonstration of mass emanating from the vaginal cuff with pelvic and   retroperitoneal lymphadenopathy unchanged    --- End of Report---          CURT PEREZ MD; Resident Radiologist  This document has been electronically signed.  REGINA SAWYER MD; Attending Radiologist  This document has been electronically signed. Jul 4 2023  6:46PM (07-04-23 @ 18:00)        Female  RADIOLOGY & ADDITIONAL TESTS:    Imaging Personally Reviewed:  [ ] YES  [ ] NO    MedsMEDICATIONS  (STANDING):  amLODIPine   Tablet 5 milliGRAM(s) Oral daily  atorvastatin 10 milliGRAM(s) Oral at bedtime  calcium carbonate 1250 mG  + Vitamin D (OsCal 500 + D) 1 Tablet(s) Oral daily  cefTRIAXone   IVPB 1000 milliGRAM(s) IV Intermittent every 24 hours  chlorhexidine 2% Cloths 1 Application(s) Topical daily  cyanocobalamin 1000 MICROGram(s) Oral daily  heparin   Injectable 5000 Unit(s) SubCutaneous every 12 hours  pantoprazole    Tablet 40 milliGRAM(s) Oral every 12 hours    MEDICATIONS  (PRN):  acetaminophen     Tablet .. 650 milliGRAM(s) Oral every 6 hours PRN Temp greater or equal to 38C (100.4F), Mild Pain (1 - 3), Moderate Pain (4 - 6)  melatonin 3 milliGRAM(s) Oral at bedtime PRN Insomnia  ondansetron Injectable 4 milliGRAM(s) IV Push every 8 hours PRN Nausea and/or Vomiting      HEALTH ISSUES - PROBLEM Dx:  Acute UTI    Renal insufficiency    Hypothyroid    HTN (hypertension)    Medication management    Encounter for deep vein thrombosis (DVT) prophylaxis    Cough                   Harshad Joshi, PGY1    BATSHEVA HOBBS  83y  Female    Complaints:  Subjective:     No admitted o/n w/ weakness and rigors. Pt denies subj fevers/chills, HA, dizziness, chest pain, palpitations, n/v, abd pain, dysuria, diarrhea      FAMILY HISTORY:  No pertinent family history in first degree relatives      83yVital Signs Last 24 Hrs  T(C): 36.9 (05 Jul 2023 05:40), Max: 37.5 (04 Jul 2023 17:05)  T(F): 98.5 (05 Jul 2023 05:40), Max: 99.5 (04 Jul 2023 17:05)  HR: 94 (05 Jul 2023 05:40) (88 - 111)  BP: 145/62 (05 Jul 2023 05:40) (145/62 - 174/76)  BP(mean): --  RR: 16 (05 Jul 2023 05:40) (16 - 99)  SpO2: 97% (05 Jul 2023 05:40) (97% - 100%)    Parameters below as of 05 Jul 2023 05:40  Patient On (Oxygen Delivery Method): room air          PHYSICAL EXAM:  CONSTITUTIONAL: No fever, weight loss,; +fatigue, rigors  EYES: No eye pain, visual disturbances, or discharge  ENMT:  No difficulty hearing, tinnitus, vertigo; No sinus or throat pain  NECK: No pain or stiffness  RESPIRATORY: mild dry  cough  CARDIOVASCULAR: No chest pain, palpitations, dizziness, or leg swelling  GASTROINTESTINAL: No abdominal or epigastric pain. No nausea, vomiting, or hematemesis; No diarrhea or constipation. No melena or hematochezia.  GENITOURINARY: No dysuria, frequency, hematuria, or incontinence  NEUROLOGICAL: No headaches, memory loss, loss of strength, numbness, or tremors  SKIN: No itching, burning, rashes, or lesions   LYMPH NODES: No enlarged glands  ENDOCRINE: No heat or cold intolerance; No hair loss  MUSCULOSKELETAL: No joint pain or swelling; No muscle, back, or extremity pain  PSYCHIATRIC: No depression, anxiety, mood swings, or difficulty sleeping  HEME/LYMPH: No easy bruising, or bleeding gums  ALLERY AND IMMUNOLOGIC: No hives or eczema      Consultant(s) Notes Reviewed:  [x ] YES  [ ] NO  Care Discussed with Consultants/Other Providers [ x] YES  [ ] NO    LABS:                          8.3    11.94 )-----------( 272      ( 05 Jul 2023 06:25 )             25.4       07-05    135  |  97<L>  |  10  ----------------------------<  97  3.5   |  25  |  4.29<H>    Ca    8.6      05 Jul 2023 06:25    TPro  6.0  /  Alb  3.2<L>  /  TBili  0.3  /  DBili  x   /  AST  18  /  ALT  7   /  AlkPhos  62  07-05              Urinalysis Basic - ( 05 Jul 2023 06:25 )    Color: x / Appearance: x / SG: x / pH: x  Gluc: 97 mg/dL / Ketone: x  / Bili: x / Urobili: x   Blood: x / Protein: x / Nitrite: x   Leuk Esterase: x / RBC: x / WBC x   Sq Epi: x / Non Sq Epi: x / Bacteria: x                  CAPILLARY BLOOD GLUCOSE            CT Abdomen and Pelvis No Cont:   ACC: [id number] EXAM:  CT ABDOMEN AND PELVIS   ORDERED BY: AUDRA BHAGAT     PROCEDURE DATE:  07/04/2023          INTERPRETATION:  CLINICAL INFORMATION: Vomiting, history of pelvic mass.   Evaluate for bowel obstruction    COMPARISON: CT abdomen pelvis 6/6/2023, CT chest 6/7/2023    CONTRAST/COMPLICATIONS:  IV Contrast: None  Oral Contrast: None  Complications: None reported    PROCEDURE:  CT of the Abdomen and Pelvis was performed.  Sagittal and coronal reformats were performed.    FINDINGS:  LOWER CHEST: Trace bilateral pleural effusions, decreased from prior.   Bibasilar atelectasis. 6 mm right middle lobe peripheral opacity not seen   on prior, may represent intrapulmonary lymph node versus atelectasis.   Left atrial enlargement. Aortic and coronary artery calcifications.    LIVER: Within normal limits.  BILE DUCTS: Normal caliber.  GALLBLADDER: Cholecystectomy.  SPLEEN: Within normal limits.  PANCREAS: Cystic lesion in the pancreatic body measuring 1 cm is again   seen (301-38). No pancreatic duct dilation.  ADRENALS: Within normal limits.  KIDNEYS/URETERS: Status post right nephrostomy tube placement improved   with resolution of right hydroureteronephrosis. Mild left-sided   hydroureteronephrosis to the level of the UVJ is unchanged. Left sided   perinephric edema is again seen. Left renal cyst.    BLADDER: Minimally distended.  REPRODUCTIVE ORGANS: Status post hysterectomy with soft tissue mass   arising from the right aspect of the cervical remnant measuring up to 4.6   cm.    BOWEL: No bowel obstruction. Appendix is not visualized. No evidence of   inflammation in the pericecal region. Colonic diverticulosis.  PERITONEUM: No ascites.  VESSELS: Atherosclerotic changes.  RETROPERITONEUM/LYMPH NODES: Stable left para-aortic and bilateral pelvic   lymphadenopathy, largest measuring 2.5 cm (301-95).).  ABDOMINAL WALL: Postsurgical changes.  BONES: Degenerative changes. Mild anterolisthesis of L4 on L5.    IMPRESSION:  No bowel obstruction.  Resolution of right-sided hydroureteronephrosis status post right   nephrostomy tube placement.  Unchanged mild left hydroureteronephrosis.   Redemonstration of mass emanating from the vaginal cuff with pelvic and   retroperitoneal lymphadenopathy unchanged    --- End of Report---          CURT PEREZ MD; Resident Radiologist  This document has been electronically signed.  REGINA SAWYER MD; Attending Radiologist  This document has been electronically signed. Jul 4 2023  6:46PM (07-04-23 @ 18:00)        Female  RADIOLOGY & ADDITIONAL TESTS:    Imaging Personally Reviewed:  [ ] YES  [ ] NO    MedsMEDICATIONS  (STANDING):  amLODIPine   Tablet 5 milliGRAM(s) Oral daily  atorvastatin 10 milliGRAM(s) Oral at bedtime  calcium carbonate 1250 mG  + Vitamin D (OsCal 500 + D) 1 Tablet(s) Oral daily  cefTRIAXone   IVPB 1000 milliGRAM(s) IV Intermittent every 24 hours  chlorhexidine 2% Cloths 1 Application(s) Topical daily  cyanocobalamin 1000 MICROGram(s) Oral daily  heparin   Injectable 5000 Unit(s) SubCutaneous every 12 hours  pantoprazole    Tablet 40 milliGRAM(s) Oral every 12 hours    MEDICATIONS  (PRN):  acetaminophen     Tablet .. 650 milliGRAM(s) Oral every 6 hours PRN Temp greater or equal to 38C (100.4F), Mild Pain (1 - 3), Moderate Pain (4 - 6)  melatonin 3 milliGRAM(s) Oral at bedtime PRN Insomnia  ondansetron Injectable 4 milliGRAM(s) IV Push every 8 hours PRN Nausea and/or Vomiting      HEALTH ISSUES - PROBLEM Dx:  Acute UTI    Renal insufficiency    Hypothyroid    HTN (hypertension)    Medication management    Encounter for deep vein thrombosis (DVT) prophylaxis    Cough

## 2023-07-05 NOTE — PATIENT PROFILE ADULT - FALL HARM RISK - HARM RISK INTERVENTIONS

## 2023-07-05 NOTE — PROGRESS NOTE ADULT - PROBLEM SELECTOR PLAN 1
Pt p/w rigors and weakness. U/A appears positive but possibly contaminated iso chronic nephrostomy  - Will c/w CTX for now as pt w/o hx of resistant infections in past  - Repeat u/a  - f/u UCx but may not be accurate d/t contaminated sample  - ID following. recs appreciated  - f/u BCx  - Trend fever/WBC curve

## 2023-07-06 LAB
ANION GAP SERPL CALC-SCNC: 18 MMOL/L — HIGH (ref 7–14)
BASOPHILS # BLD AUTO: 0.09 K/UL — SIGNIFICANT CHANGE UP (ref 0–0.2)
BASOPHILS NFR BLD AUTO: 0.6 % — SIGNIFICANT CHANGE UP (ref 0–2)
BUN SERPL-MCNC: 23 MG/DL — SIGNIFICANT CHANGE UP (ref 7–23)
CALCIUM SERPL-MCNC: 9 MG/DL — SIGNIFICANT CHANGE UP (ref 8.4–10.5)
CHLORIDE SERPL-SCNC: 95 MMOL/L — LOW (ref 98–107)
CO2 SERPL-SCNC: 24 MMOL/L — SIGNIFICANT CHANGE UP (ref 22–31)
CREAT SERPL-MCNC: 6.43 MG/DL — HIGH (ref 0.5–1.3)
EGFR: 6 ML/MIN/1.73M2 — LOW
EOSINOPHIL # BLD AUTO: 0.06 K/UL — SIGNIFICANT CHANGE UP (ref 0–0.5)
EOSINOPHIL NFR BLD AUTO: 0.4 % — SIGNIFICANT CHANGE UP (ref 0–6)
GLUCOSE SERPL-MCNC: 110 MG/DL — HIGH (ref 70–99)
HCT VFR BLD CALC: 25.4 % — LOW (ref 34.5–45)
HGB BLD-MCNC: 8.1 G/DL — LOW (ref 11.5–15.5)
IANC: 10.71 K/UL — HIGH (ref 1.8–7.4)
IMM GRANULOCYTES NFR BLD AUTO: 1.7 % — HIGH (ref 0–0.9)
LYMPHOCYTES # BLD AUTO: 1.63 K/UL — SIGNIFICANT CHANGE UP (ref 1–3.3)
LYMPHOCYTES # BLD AUTO: 11.6 % — LOW (ref 13–44)
MAGNESIUM SERPL-MCNC: 1.9 MG/DL — SIGNIFICANT CHANGE UP (ref 1.6–2.6)
MCHC RBC-ENTMCNC: 29.7 PG — SIGNIFICANT CHANGE UP (ref 27–34)
MCHC RBC-ENTMCNC: 31.9 GM/DL — LOW (ref 32–36)
MCV RBC AUTO: 93 FL — SIGNIFICANT CHANGE UP (ref 80–100)
MONOCYTES # BLD AUTO: 1.34 K/UL — HIGH (ref 0–0.9)
MONOCYTES NFR BLD AUTO: 9.5 % — SIGNIFICANT CHANGE UP (ref 2–14)
NEUTROPHILS # BLD AUTO: 10.71 K/UL — HIGH (ref 1.8–7.4)
NEUTROPHILS NFR BLD AUTO: 76.2 % — SIGNIFICANT CHANGE UP (ref 43–77)
NRBC # BLD: 0 /100 WBCS — SIGNIFICANT CHANGE UP (ref 0–0)
NRBC # FLD: 0 K/UL — SIGNIFICANT CHANGE UP (ref 0–0)
PHOSPHATE SERPL-MCNC: 4.4 MG/DL — SIGNIFICANT CHANGE UP (ref 2.5–4.5)
PLATELET # BLD AUTO: 364 K/UL — SIGNIFICANT CHANGE UP (ref 150–400)
POTASSIUM SERPL-MCNC: 3.7 MMOL/L — SIGNIFICANT CHANGE UP (ref 3.5–5.3)
POTASSIUM SERPL-SCNC: 3.7 MMOL/L — SIGNIFICANT CHANGE UP (ref 3.5–5.3)
RBC # BLD: 2.73 M/UL — LOW (ref 3.8–5.2)
RBC # FLD: 13.1 % — SIGNIFICANT CHANGE UP (ref 10.3–14.5)
SODIUM SERPL-SCNC: 137 MMOL/L — SIGNIFICANT CHANGE UP (ref 135–145)
T3 SERPL-MCNC: 62 NG/DL — LOW (ref 80–200)
T4 FREE SERPL-MCNC: 1.8 NG/DL — SIGNIFICANT CHANGE UP (ref 0.9–1.8)
TSH SERPL-MCNC: 2.33 UIU/ML — SIGNIFICANT CHANGE UP (ref 0.27–4.2)
WBC # BLD: 14.07 K/UL — HIGH (ref 3.8–10.5)
WBC # FLD AUTO: 14.07 K/UL — HIGH (ref 3.8–10.5)

## 2023-07-06 PROCEDURE — 74230 X-RAY XM SWLNG FUNCJ C+: CPT | Mod: 26

## 2023-07-06 PROCEDURE — 74018 RADEX ABDOMEN 1 VIEW: CPT | Mod: 26

## 2023-07-06 PROCEDURE — 93970 EXTREMITY STUDY: CPT | Mod: 26

## 2023-07-06 RX ORDER — SIMETHICONE 80 MG/1
80 TABLET, CHEWABLE ORAL DAILY
Refills: 0 | Status: DISCONTINUED | OUTPATIENT
Start: 2023-07-06 | End: 2023-07-09

## 2023-07-06 RX ORDER — SENNA PLUS 8.6 MG/1
2 TABLET ORAL AT BEDTIME
Refills: 0 | Status: DISCONTINUED | OUTPATIENT
Start: 2023-07-06 | End: 2023-07-06

## 2023-07-06 RX ORDER — ONDANSETRON 8 MG/1
4 TABLET, FILM COATED ORAL ONCE
Refills: 0 | Status: COMPLETED | OUTPATIENT
Start: 2023-07-06 | End: 2023-07-06

## 2023-07-06 RX ADMIN — CEFTRIAXONE 100 MILLIGRAM(S): 500 INJECTION, POWDER, FOR SOLUTION INTRAMUSCULAR; INTRAVENOUS at 22:24

## 2023-07-06 RX ADMIN — HEPARIN SODIUM 5000 UNIT(S): 5000 INJECTION INTRAVENOUS; SUBCUTANEOUS at 06:29

## 2023-07-06 RX ADMIN — AMLODIPINE BESYLATE 5 MILLIGRAM(S): 2.5 TABLET ORAL at 06:29

## 2023-07-06 RX ADMIN — PANTOPRAZOLE SODIUM 40 MILLIGRAM(S): 20 TABLET, DELAYED RELEASE ORAL at 06:29

## 2023-07-06 RX ADMIN — SIMETHICONE 80 MILLIGRAM(S): 80 TABLET, CHEWABLE ORAL at 22:27

## 2023-07-06 RX ADMIN — ATORVASTATIN CALCIUM 10 MILLIGRAM(S): 80 TABLET, FILM COATED ORAL at 22:24

## 2023-07-06 RX ADMIN — HEPARIN SODIUM 5000 UNIT(S): 5000 INJECTION INTRAVENOUS; SUBCUTANEOUS at 17:49

## 2023-07-06 RX ADMIN — ONDANSETRON 4 MILLIGRAM(S): 8 TABLET, FILM COATED ORAL at 09:25

## 2023-07-06 RX ADMIN — ONDANSETRON 4 MILLIGRAM(S): 8 TABLET, FILM COATED ORAL at 17:46

## 2023-07-06 NOTE — PROGRESS NOTE ADULT - ASSESSMENT
82 yo f with h/o htn, hypothyroid,  recently discovered pelvic mass causing obstructive uropathy, and renal failure, now with nephrostomy tube and dialysis t/th/sa. Reports new-onset generalized weakness, and rigors w/ U/a that appears possibly contaminated.

## 2023-07-06 NOTE — CONSULT NOTE ADULT - NS ATTEND AMEND GEN_ALL_CORE FT
No pain, no shortness of breath    Review of systems: All 10 points ROS was obtained except as above.     acetaminophen     Tablet .. 650 milliGRAM(s) Oral every 6 hours PRN  amLODIPine   Tablet 5 milliGRAM(s) Oral daily  atorvastatin 10 milliGRAM(s) Oral at bedtime  calcium carbonate 1250 mG  + Vitamin D (OsCal 500 + D) 1 Tablet(s) Oral daily  cefTRIAXone   IVPB 1000 milliGRAM(s) IV Intermittent every 24 hours  chlorhexidine 2% Cloths 1 Application(s) Topical daily  cyanocobalamin 1000 MICROGram(s) Oral daily  heparin   Injectable 5000 Unit(s) SubCutaneous every 12 hours  melatonin 3 milliGRAM(s) Oral at bedtime PRN  ondansetron Injectable 4 milliGRAM(s) IV Push every 8 hours PRN  pantoprazole    Tablet 40 milliGRAM(s) Oral every 12 hours  polyethylene glycol 3350 17 Gram(s) Oral daily  simethicone 80 milliGRAM(s) Chew daily      VITAL:  T(C): , Max: 37.8 (07-05-23 @ 21:12)  T(F): , Max: 100.1 (07-05-23 @ 21:12)  HR: 101 (07-06-23 @ 15:10)  BP: 148/68 (07-06-23 @ 15:10)  BP(mean): --  RR: 16 (07-06-23 @ 15:10)  SpO2: 94% (07-06-23 @ 05:33)  Wt(kg): --    07-05-23 @ 07:01  -  07-06-23 @ 07:00  --------------------------------------------------------  IN: 125 mL / OUT: 510 mL / NET: -385 mL    07-06-23 @ 07:01  -  07-06-23 @ 20:55  --------------------------------------------------------  IN: 800 mL / OUT: 900 mL / NET: -100 mL        PHYSICAL EXAM:  General: NAD; Alert  HEENT:  NCAT; PERRLA  Neck: No JVD; supple  Respiratory: CTA-b/l  Cardiac: RRR s1s2  Gastrointestinal: BS+, soft, NT/ND  Urologic: No nam  Extremities: No peripheral edema  Access: Right permacath.     LABS:                          8.1    14.07 )-----------( 364      ( 06 Jul 2023 11:57 )             25.4     Na(137)/K(3.7)/Cl(95)/HCO3(24)/BUN(23)/Cr(6.43)Glu(110)/Ca(9.0)/Mg(1.90)/PO4(4.4)    07-06 @ 11:57  Na(135)/K(3.5)/Cl(97)/HCO3(25)/BUN(10)/Cr(4.29)Glu(97)/Ca(8.6)/Mg(--)/PO4(--)    07-05 @ 06:25  Na(135)/K(3.4)/Cl(95)/HCO3(28)/BUN(6)/Cr(2.69)Glu(99)/Ca(9.3)/Mg(--)/PO4(--)    07-04 @ 17:15    Urinalysis Basic - ( 06 Jul 2023 11:57 )    Color: x / Appearance: x / SG: x / pH: x  Gluc: 110 mg/dL / Ketone: x  / Bili: x / Urobili: x   Blood: x / Protein: x / Nitrite: x   Leuk Esterase: x / RBC: x / WBC x   Sq Epi: x / Non Sq Epi: x / Bacteria: x            ASSESSMENT/PLAN  ESRD on HD as tolerated.

## 2023-07-06 NOTE — SWALLOW VFSS/MBS ASSESSMENT ADULT - COMMENTS
Internal Medicine note 7/6 "82 yo f with h/o htn, hypothyroid,  recently discovered pelvic mass causing obstructive uropathy, and renal failure, now with nephrostomy tube and dialysis t/th/sa. Reports new-onset generalized weakness, and rigors w/ U/a that appears possibly contaminated. Pt reports dysphagia to solids not liquids. Reports sensation of food stuck in lower esophagus/chest w/ regurgitation ongoing intermittently for a few weeks/months."    Patient received in Radiology suite this AM for an initial Cinesophagram at which time patient was alert. Patient's daughter present. Patient states she had a "port" removed (pointed to right side of neck) in June of this year. Since her port was removed she has been having dysphagia to solids. Patient states she can chew the solids without difficulty however "when it comes to the swallow part, I can't swallow it and I start to gag". Patient further reporting nausea and noted to have emesis bag upon arrival.

## 2023-07-06 NOTE — CONSULT NOTE ADULT - SUBJECTIVE AND OBJECTIVE BOX
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 Reports new-onset generalized weakness, and rigors. Denies change to nephrostomy  volume output, or fluid appearance. Renal function  in general has been improving and has begun voiding scant amounts of urine in last week. Denies pain on urination but UA +. Planned for outpt Onc and GYN evals later this week  Also reporting intermittent dry cough over last 4 weeks; lower chest of abd + BL atelectasis  outpatient unit: [facility] dialysis nephrologist Dr. 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      History of cholecystectomy      History of appendectomy      H/O: hysterectomy          Home Medications Reviewed    Hospital Medications:   MEDICATIONS  (STANDING):  amLODIPine   Tablet 5 milliGRAM(s) Oral daily  atorvastatin 10 milliGRAM(s) Oral at bedtime  calcium carbonate 1250 mG  + Vitamin D (OsCal 500 + D) 1 Tablet(s) Oral daily  cefTRIAXone   IVPB 1000 milliGRAM(s) IV Intermittent every 24 hours  chlorhexidine 2% Cloths 1 Application(s) Topical daily  cyanocobalamin 1000 MICROGram(s) Oral daily  heparin   Injectable 5000 Unit(s) SubCutaneous every 12 hours  pantoprazole    Tablet 40 milliGRAM(s) Oral every 12 hours  polyethylene glycol 3350 17 Gram(s) Oral daily      SOCIAL HISTORY:  Denies ETOh, Smoking,     FAMILY HISTORY:  No pertinent family history in first degree relatives        REVIEW OF SYSTEMS:  CONSTITUTIONAL: + weakness, fevers or chills  EYES/ENT: No visual changes;  No vertigo or throat pain   NECK: No pain or stiffness  RESPIRATORY: No cough, wheezing, hemoptysis; No shortness of breath  CARDIOVASCULAR: No chest pain or palpitations.  GASTROINTESTINAL see hpi  GENITOURINARY: No dysuria, frequency, foamy urine, urinary urgency, incontinence or hematuria  NEUROLOGICAL: No numbness or weakness  SKIN: No itching, burning, rashes, or lesions   VASCULAR: No bilateral lower extremity edema.   All other review of systems is negative unless indicated above.    VITALS:  T(F): 98.8 (23 @ 11:45), Max: 100.1 (23 @ 21:12)  HR: 99 (23 @ 11:45)  BP: 168/76 (23 @ 11:45)  RR: 18 (23 @ 11:45)  SpO2: 94% (23 @ 05:33)  Wt(kg): --     @ 07:01  -   @ 07:00  --------------------------------------------------------  IN: 125 mL / OUT: 510 mL / NET: -385 mL          PHYSICAL EXAM:  General: NAD  HEENT: anicteric sclera, oropharynx clear, MMM  Neck: No JVD  Respiratory: CTAB, no wheezes, rales or rhonchi  Cardiovascular: S1, S2, RRR  Gastrointestinal: BS+, soft, right NT  Extremities: No cyanosis or clubbing. No peripheral edema  Neurological: A/O x 3, no focal deficits  Psychiatric: Normal mood, normal affect  : No CVA tenderness. No nam.   Skin: No rashes  Vascular Access: right pc    LABS:      135  |  97<L>  |  10  ----------------------------<  97  3.5   |  25  |  4.29<H>    Ca    8.6      2023 06:25    TPro  6.0  /  Alb  3.2<L>  /  TBili  0.3  /  DBili      /  AST  18  /  ALT  7   /  AlkPhos  62      Creatinine Trend: 4.29 <--, 2.69 <--                        8.3    11.94 )-----------( 272      ( 2023 06:25 )             25.4     Urine Studies:  Urinalysis Basic - ( 2023 19:30 )    Color: Yellow / Appearance: Turbid / S.012 / pH:   Gluc:  / Ketone: Negative mg/dL  / Bili: Negative / Urobili: 0.2 mg/dL   Blood:  / Protein: 300 mg/dL / Nitrite: Negative   Leuk Esterase: Large / RBC: 29 /HPF /  /HPF   Sq Epi:  / Non Sq Epi: 42 /HPF / Bacteria: Moderate /HPF          RADIOLOGY & ADDITIONAL STUDIES:                 [facility]   MD NIKITA MARRERO MD KRISTINE SOLTANPOUR, DO ANGELA WONG, PA        TEL:  OFFICE: [phone]  From 5pm-7am answering service [phone]    --- INITIAL RENAL CONSULT NOTE ---date of service 23 @ 12:43    HPI:  82 yo f with h/o htn, hypothyroid,  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 Reports new-onset generalized weakness, and rigors. Denies change to nephrostomy  volume output, or fluid appearance. Renal function  in general has been improving and has begun voiding scant amounts of urine in last week. Denies pain on urination but UA +. Planned for outpt Onc and GYN evals later this week.   Also reporting intermittent dry cough over last 4 weeks; lower chest of abd + BL atelectasis  outpatient unit: [facility] dialysis nephrologist Dr. 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      History of cholecystectomy      History of appendectomy      H/O: hysterectomy        Home Medications:  Caltrate 600 + D oral tablet: 1 tab(s) orally once a day (2023 04:22)  pantoprazole 40 mg oral delayed release tablet: 1 tab(s) orally 2 times a day (2023 17:48)  pravastatin 20 mg oral tablet: 1 tab(s) orally once a day (2023 04:22)    Home Medications Reviewed    Hospital Medications:   MEDICATIONS  (STANDING):  amLODIPine   Tablet 5 milliGRAM(s) Oral daily  atorvastatin 10 milliGRAM(s) Oral at bedtime  calcium carbonate 1250 mG  + Vitamin D (OsCal 500 + D) 1 Tablet(s) Oral daily  cefTRIAXone   IVPB 1000 milliGRAM(s) IV Intermittent every 24 hours  chlorhexidine 2% Cloths 1 Application(s) Topical daily  cyanocobalamin 1000 MICROGram(s) Oral daily  heparin   Injectable 5000 Unit(s) SubCutaneous every 12 hours  pantoprazole    Tablet 40 milliGRAM(s) Oral every 12 hours  polyethylene glycol 3350 17 Gram(s) Oral daily      SOCIAL HISTORY:  Denies ETOh, Smoking,     FAMILY HISTORY:  No pertinent family history in first degree relatives        REVIEW OF SYSTEMS:  CONSTITUTIONAL: + weakness, fevers or chills  EYES/ENT: No visual changes;  No vertigo or throat pain   NECK: No pain or stiffness  RESPIRATORY: No cough, wheezing, hemoptysis; No shortness of breath  CARDIOVASCULAR: No chest pain or palpitations.  GASTROINTESTINAL see hpi  GENITOURINARY: No dysuria, frequency, foamy urine, urinary urgency, incontinence or hematuria  NEUROLOGICAL: No numbness or weakness  SKIN: No itching, burning, rashes, or lesions   VASCULAR: No bilateral lower extremity edema.   All other review of systems is negative unless indicated above.    VITALS:  T(F): 98.8 (23 @ 11:45), Max: 100.1 (23 @ 21:12)  HR: 99 (23 @ 11:45)  BP: 168/76 (23 @ 11:45)  RR: 18 (23 @ 11:45)  SpO2: 94% (23 @ 05:33)  Wt(kg): --     @ 07:01  -   @ 07:00  --------------------------------------------------------  IN: 125 mL / OUT: 510 mL / NET: -385 mL          PHYSICAL EXAM:  General: NAD  HEENT: anicteric sclera, oropharynx clear, MMM  Neck: No JVD  Respiratory: CTAB, no wheezes, rales or rhonchi  Cardiovascular: S1, S2, RRR  Gastrointestinal: BS+, soft, right NT  Extremities: No cyanosis or clubbing. No peripheral edema  Neurological: A/O x 3, no focal deficits  Psychiatric: Normal mood, normal affect  : No CVA tenderness. No nam.   Skin: No rashes  Vascular Access: right pc    LABS:      135  |  97<L>  |  10  ----------------------------<  97  3.5   |  25  |  4.29<H>    Ca    8.6      2023 06:25    TPro  6.0  /  Alb  3.2<L>  /  TBili  0.3  /  DBili      /  AST  18  /  ALT  7   /  AlkPhos  62  07-05    Creatinine Trend: 4.29 <--, 2.69 <--                        8.3    11.94 )-----------( 272      ( 2023 06:25 )             25.4     Urine Studies:  Urinalysis Basic - ( 2023 19:30 )    Color: Yellow / Appearance: Turbid / S.012 / pH:   Gluc:  / Ketone: Negative mg/dL  / Bili: Negative / Urobili: 0.2 mg/dL   Blood:  / Protein: 300 mg/dL / Nitrite: Negative   Leuk Esterase: Large / RBC: 29 /HPF /  /HPF   Sq Epi:  / Non Sq Epi: 42 /HPF / Bacteria: Moderate /HPF          RADIOLOGY & ADDITIONAL STUDIES:  ******PRELIMINARY REPORT******      ******PRELIMINARY REPORT******         ACC: [id number] EXAM:  XR ABDOMEN PORTABLE URGENT 1V   ORDERED BY:   ANA CRENSHAW     PROCEDURE DATE:  2023    ******PRELIMINARY REPORT******      ******PRELIMINARY REPORT******           INTERPRETATION:  residual oral contrast opacifies a few loops of non   dilated bowel. air filled right and transverse colon. right nephrostomy   tube.        ******PRELIMINARY REPORT******      ******PRELIMINARY REPORT******       CJ SMITH DO; Resident Radiologist  This document is a PRELIMINARY interpretation and is pending final   attending approval. 2023  7:28PM

## 2023-07-06 NOTE — PROGRESS NOTE ADULT - SUBJECTIVE AND OBJECTIVE BOX
[facility], Division of Infectious Diseases  JARED Marte Y. Patel, S. Shah, G. Aman  [phone]  ([phone] - weekdays after 5pm and weekends)    Name: BATSHEVA HOBBS  Age/Gender: 83y Female  MRN: [id number]    Interval History:  Notes reviewed.   No concerning overnight events.  Afebrile.   reports nausea and a bout of emesis this AM after attempting to eat a cracker  slept ok overnight  daughter at bedside    Allergies: No Known Allergies      Objective:  Vitals:   T(F): 99.2 (23 @ 05:33), Max: 100.1 (23 @ 21:12)  HR: 94 (23 @ 05:33) (93 - 103)  BP: 146/76 (23 @ 05:33) (141/64 - 146/76)  RR: 16 (23 @ 05:33) (16 - 17)  SpO2: 94% (23 @ 05:33) (94% - 95%)  Physical Examination:  General: no acute distress  HEENT: anicteric  Cardio: S1, S2, normal rate  Resp: breathing comfortably on RA  Abd: soft, NT, ND  Back: no flank tenderness, R nephrostomy tube w/o surrounding erythema or tenderness  Ext: no LE edema  Skin: warm, dry    Laboratory Studies:  CBC:                       8.3    11.94 )-----------( 272      ( 2023 06:25 )             25.4     WBC Trend:  11.94 23 @ 06:25  12.41 23 @ 17:15    CMP:     135  |  97<L>  |  10  ----------------------------<  97  3.5   |  25  |  4.29<H>    Ca    8.6      2023 06:25    TPro  6.0  /  Alb  3.2<L>  /  TBili  0.3  /  DBili  x   /  AST  18  /  ALT  7   /  AlkPhos  62  07-05      LIVER FUNCTIONS - ( 2023 06:25 )  Alb: 3.2 g/dL / Pro: 6.0 g/dL / ALK PHOS: 62 U/L / ALT: 7 U/L / AST: 18 U/L / GGT: x             Urinalysis Basic - ( 2023 19:30 )    Color: Yellow / Appearance: Turbid / S.012 / pH: x  Gluc: x / Ketone: Negative mg/dL  / Bili: Negative / Urobili: 0.2 mg/dL   Blood: x / Protein: 300 mg/dL / Nitrite: Negative   Leuk Esterase: Large / RBC: 29 /HPF /  /HPF   Sq Epi: x / Non Sq Epi: 42 /HPF / Bacteria: Moderate /HPF      Microbiology: reviewed     Culture - Blood (collected 23 @ 21:44)  Source: .Blood Blood-Peripheral  Preliminary Report (23 @ 03:02):    No growth at 24 hours    Culture - Blood (collected 23 @ 21:29)  Source: .Blood Blood-Venous  Preliminary Report (23 @ 03:02):    No growth at 24 hours    Culture - Urine (collected 23 @ 19:41)  Source: Clean Catch Clean Catch (Midstream)  Final Report (23 @ 23:45):    >=3 organisms. Probable collection contamination.        Radiology: reviewed     Medications:  acetaminophen     Tablet .. 650 milliGRAM(s) Oral every 6 hours PRN  amLODIPine   Tablet 5 milliGRAM(s) Oral daily  atorvastatin 10 milliGRAM(s) Oral at bedtime  calcium carbonate 1250 mG  + Vitamin D (OsCal 500 + D) 1 Tablet(s) Oral daily  cefTRIAXone   IVPB 1000 milliGRAM(s) IV Intermittent every 24 hours  chlorhexidine 2% Cloths 1 Application(s) Topical daily  cyanocobalamin 1000 MICROGram(s) Oral daily  heparin   Injectable 5000 Unit(s) SubCutaneous every 12 hours  melatonin 3 milliGRAM(s) Oral at bedtime PRN  ondansetron Injectable 4 milliGRAM(s) IV Push every 8 hours PRN  pantoprazole    Tablet 40 milliGRAM(s) Oral every 12 hours  polyethylene glycol 3350 17 Gram(s) Oral daily    Antimicrobials:  cefTRIAXone   IVPB 1000 milliGRAM(s) IV Intermittent every 24 hours

## 2023-07-06 NOTE — PROGRESS NOTE ADULT - ASSESSMENT
82 y/o F PMhx HTN, hypothyroidism, hydronephrosis c/b renal failure 2/2 obstructive uropathy 2/2 recently discovered pelvic mass s/p R nephrostomy tube 6/8 and HD via R tunneled permcath who presented w/ generalized weakness, rigors, nausea, poor PO intake.     leukocytosis, generalized malaise, r/o UTI  CT abd/pelvis- No bowel obstruction. Resolution of R-sided hydroureteronephrosis s/p R nephrostomy tube placement. Unchanged mild left hydroureteronephrosis. Redemonstration of mass emanating from the vaginal cuff   Recommendations  repeat UA contaminated, would attempt to obtain urine sample via straight cath, pt agreeable  nephrostomy tube has been present since 6/8, would avoid obtaining urine from nephrostomy tube as any organism would likely reflect colonization  c/w ceftriaxone for now  f/u blood cultures- NGTD  trend temps, wbc    Shree Newton M.D.  [facility], Division of Infectious Diseases  [phone]  After 5pm on weekdays and all day on weekends - please call [phone]  84 y/o F PMhx HTN, hypothyroidism, hydronephrosis c/b renal failure 2/2 obstructive uropathy 2/2 recently discovered pelvic mass s/p R nephrostomy tube 6/8 and HD via R tunneled permcath who presented w/ generalized weakness, rigors, nausea, poor PO intake.     leukocytosis, generalized malaise, r/o UTI  CT abd/pelvis- No bowel obstruction. Resolution of R-sided hydroureteronephrosis s/p R nephrostomy tube placement. Unchanged mild left hydroureteronephrosis. Redemonstration of mass emanating from the vaginal cuff   RVP negative  Recommendations  repeat UA contaminated, would attempt to obtain urine sample via straight cath, pt agreeable  nephrostomy tube has been present since 6/8, would avoid obtaining urine from nephrostomy tube as any organism would likely reflect colonization  c/w ceftriaxone for now  f/u blood cultures- NGTD  trend temps, wbc    Shree Newton M.D.  [facility], Division of Infectious Diseases  [phone]  After 5pm on weekdays and all day on weekends - please call [phone]

## 2023-07-06 NOTE — PROGRESS NOTE ADULT - PROBLEM SELECTOR PLAN 6
Also reporting intermittent dry cough over last 4 weeks; lower chest of abd + BL atelectasis   trial Inc Spir  - Check RVP; possible etiology of rigors/weaknes

## 2023-07-06 NOTE — SWALLOW VFSS/MBS ASSESSMENT ADULT - RECOMMENDED CONSISTENCY
1) From an oropharyngeal standpoint: Regular with thin liquids  2) Aspiration/reflux precautions 1) From an oropharyngeal standpoint: Regular solids with thin liquids  2) Aspiration/reflux precautions

## 2023-07-06 NOTE — SWALLOW VFSS/MBS ASSESSMENT ADULT - DEMONSTRATES NEED FOR REFERRAL TO ANOTHER SERVICE
1) Consider Neurology consult given patient reporting/exhibiting gag sensitively (cranial nerve) 2) Consider ENT consult given patient reports symptoms initiated after port removal on right side of neck 3) Consider GI consult to rule out esophageal dysphagia given patient reporting nausea

## 2023-07-06 NOTE — PROGRESS NOTE ADULT - PROBLEM SELECTOR PLAN 1
SUBJECTIVE:    Patient ID: Sylvia Maxwell is a 73 y.o. female.    Chief Complaint: Abdominal Pain (No bottles/Pt c/o abdominal tenderness x few months/bg)    73y.o. female who presents today for urgent visit. She is treated regularly for depression, dyslipidemia and gerd.  She is complaining of abdominal pain. Seems like abdominal pain is becoming more frequent. Not sure if related to meals. Does have some belching. Bowel movements are normal no blood. No pain with urination. No change in appetite      Telephone on 02/10/2023   Component Date Value Ref Range Status    Cholesterol 02/14/2023 184  <200 mg/dL Final    HDL 02/14/2023 80  > OR = 50 mg/dL Final    Triglycerides 02/14/2023 56  <150 mg/dL Final    LDL Cholesterol 02/14/2023 90  mg/dL (calc) Final    HDL/Cholesterol Ratio 02/14/2023 2.3  <5.0 (calc) Final    Non HDL Chol. (LDL+VLDL) 02/14/2023 104  <130 mg/dL (calc) Final    TSH w/reflex to FT4 02/14/2023 1.32  0.40 - 4.50 mIU/L Final    Color, UA 02/14/2023 YELLOW  YELLOW Final    Appearance, UA 02/14/2023 CLEAR  CLEAR Final    Specific [place], UA 02/14/2023 1.021  1.001 - 1.035 Final    pH, UA 02/14/2023 6.5  5.0 - 8.0 Final    Glucose, UA 02/14/2023 NEGATIVE  NEGATIVE Final    Bilirubin, UA 02/14/2023 NEGATIVE  NEGATIVE Final    Ketones, UA 02/14/2023 NEGATIVE  NEGATIVE Final    Occult Blood UA 02/14/2023 NEGATIVE  NEGATIVE Final    Protein, UA 02/14/2023 NEGATIVE  NEGATIVE Final    Nitrite, UA 02/14/2023 NEGATIVE  NEGATIVE Final    Leukocytes, UA 02/14/2023 NEGATIVE  NEGATIVE Final    WBC Casts, UA 02/14/2023 NONE SEEN  < OR = 5 /HPF Final    RBC Casts, UA 02/14/2023 NONE SEEN  < OR = 2 /HPF Final    Squam Epithel, UA 02/14/2023 NONE SEEN  < OR = 5 /HPF Final    Bacteria, UA 02/14/2023 NONE SEEN  NONE SEEN /HPF Final    Hyaline Casts, UA 02/14/2023 NONE SEEN  NONE SEEN /LPF Final    Service Cmt: 02/14/2023    Final    Reflexive Urine Culture 02/14/2023    Final    WBC 02/14/2023 4.0  3.8 - 10.8  Thousand/uL Final    RBC 02/14/2023 3.97  3.80 - 5.10 Million/uL Final    Hemoglobin 02/14/2023 12.4  11.7 - 15.5 g/dL Final    Hematocrit 02/14/2023 37.7  35.0 - 45.0 % Final    MCV 02/14/2023 95.0  80.0 - 100.0 fL Final    MCH 02/14/2023 31.2  27.0 - 33.0 pg Final    MCHC 02/14/2023 32.9  32.0 - 36.0 g/dL Final    RDW 02/14/2023 11.9  11.0 - 15.0 % Final    Platelets 02/14/2023 201  140 - 400 Thousand/uL Final    MPV 02/14/2023 11.6  7.5 - 12.5 fL Final    Neutrophils, Abs 02/14/2023 2,276  1,500 - 7,800 cells/uL Final    Lymph # 02/14/2023 1,092  850 - 3,900 cells/uL Final    Mono # 02/14/2023 520  200 - 950 cells/uL Final    Eos # 02/14/2023 32  15 - 500 cells/uL Final    Baso # 02/14/2023 80  0 - 200 cells/uL Final    Neutrophils Relative 02/14/2023 56.9  % Final    Lymph % 02/14/2023 27.3  % Final    Mono % 02/14/2023 13.0  % Final    Eosinophil % 02/14/2023 0.8  % Final    Basophil % 02/14/2023 2.0  % Final    Glucose 02/14/2023 80  65 - 99 mg/dL Final    BUN 02/14/2023 19  7 - 25 mg/dL Final    Creatinine 02/14/2023 0.54 (L)  0.60 - 1.00 mg/dL Final    eGFR 02/14/2023 98  > OR = 60 mL/min/1.73m2 Final    BUN/Creatinine Ratio 02/14/2023 35 (H)  6 - 22 (calc) Final    Sodium 02/14/2023 137  135 - 146 mmol/L Final    Potassium 02/14/2023 4.0  3.5 - 5.3 mmol/L Final    Chloride 02/14/2023 98  98 - 110 mmol/L Final    CO2 02/14/2023 29  20 - 32 mmol/L Final    Calcium 02/14/2023 9.3  8.6 - 10.4 mg/dL Final    Total Protein 02/14/2023 6.7  6.1 - 8.1 g/dL Final    Albumin 02/14/2023 4.3  3.6 - 5.1 g/dL Final    Globulin, Total 02/14/2023 2.4  1.9 - 3.7 g/dL (calc) Final    Albumin/Globulin Ratio 02/14/2023 1.8  1.0 - 2.5 (calc) Final    Total Bilirubin 02/14/2023 0.9  0.2 - 1.2 mg/dL Final    Alkaline Phosphatase 02/14/2023 80  37 - 153 U/L Final    AST 02/14/2023 21  10 - 35 U/L Final    ALT 02/14/2023 18  6 - 29 U/L Final   Hospital Outpatient Visit on 01/04/2023   Component Date Value Ref Range Status    AV  regurgitation pressure 1/2 time 01/04/2023 496  ms Final    Ao peak celestine 01/04/2023 1.43  m/s Final    AV mean gradient 01/04/2023 5  mmHg Final    AORTIC VALVE CUSP SEPERATION 01/04/2023 1.80  cm Final    Ao VTI 01/04/2023 30.5  cm Final    IVRT 01/04/2023 141.00  msec Final    IVS 01/04/2023 0.71  0.6 - 1.1 cm Final    LVIDd 01/04/2023 4.02  3.5 - 6.0 cm Final    LVIDs 01/04/2023 3.01  2.1 - 4.0 cm Final    LVOT diameter 01/04/2023 2.00  cm Final    LVOT peak VTI 01/04/2023 22.40  cm Final    Posterior Wall 01/04/2023 0.82  0.6 - 1.1 cm Final    LA size 01/04/2023 3.20  cm Final    E wave deceleration time 01/04/2023 218.00  msec Final    MV stenosis pressure 1/2 time 01/04/2023 70.00  ms Final    MV Peak A [place] 01/04/2023 0.69  m/s Final    LVOT peak celestine 01/04/2023 0.93  m/s Final    RVDD 01/04/2023 1.72  cm Final    TR Max Cel[place] 01/04/2023 2.55  m/s Final    Triscuspid Valve Regurgitation Pea* 01/04/2023 26  mmHg Final    BSA 01/04/2023 1.52  m2 Final    TDI SEPTAL 01/04/2023 0.06  m/s Final    LV LATERAL E/E' RATIO 01/04/2023 9.67  m/s Final    LV SEPTAL E/E' RATIO 01/04/2023 14.50  m/s Final    Left Ventricular Outflow Tract Melissa* 01/04/2023 0.63  cm/s Final    Left Ventricular Outflow Tract Melissa* 01/04/2023 2.00  mmHg Final    TDI LATERAL 01/04/2023 0.09  m/s Final    Ao root annulus 01/04/2023 2.60  cm Final    FS 01/04/2023 25  28 - 44 % Final    LV mass 01/04/2023 88.78  g Final    Left Ventricle Relative Wall Thick* 01/04/2023 0.41  cm Final    AV valve area 01/04/2023 2.31  cm2 Final    AV Velocity Ratio 01/04/2023 0.65   Final    AV index (prosthetic) 01/04/2023 0.73   Final    MV valve area p 1/2 method 01/04/2023 3.14  cm2 Final    E/A ratio 01/04/2023 1.26   Final    Mean e' 01/04/2023 0.08  m/s Final    LVOT area 01/04/2023 3.1  cm2 Final    LVOT stroke volume 01/04/2023 70.34  cm3 Final    AV peak gradient 01/04/2023 8  mmHg Final    E/E' ratio 01/04/2023 11.60  m/s Final    MV Peak E Cel[place] 01/04/2023 0.87   m/s Final    AR Max Cel[place] 01/04/2023 3.39  m/s Final    LV Systolic Volume 01/04/2023 30.10  mL Final    LV Systolic Volume Index 01/04/2023 19.8  mL/m2 Final    LV Diastolic Volume 01/04/2023 70.80  mL Final    LV Diastolic Volume Index 01/04/2023 46.58  mL/m2 Final    LV Mass Index 01/04/2023 58  g/m2 Final    EF 01/04/2023 65  % Final       History reviewed. No pertinent past medical history.  Social History     Socioeconomic History    Marital status:    Tobacco Use    Smoking status: Former    Smokeless tobacco: Never   Substance and Sexual Activity    Alcohol use: Yes    Drug use: Never     Social Determinants of Health     Financial Resource Strain: Unknown    Difficulty of Paying Living Expenses: Patient refused   Food Insecurity: Unknown    Worried About Running Out of Food in the Last Year: Patient refused    Ran Out of Food in the Last Year: Patient refused   Transportation Needs: Unknown    Lack of Transportation (Medical): Patient refused    Lack of Transportation (Non-Medical): Patient refused   Physical Activity: Unknown    Days of Exercise per Week: Patient refused    Minutes of Exercise per Session: 30 min   Stress: Unknown    Feeling of Stress : Patient refused   Social Connections: Unknown    Frequency of Communication with Friends and Family: Patient refused    Frequency of Social Gatherings with Friends and Family: Patient refused    Active Member of Clubs or Organizations: Patient refused    Attends Club or Organization Meetings: Patient refused    Marital Status: Patient refused   Housing Stability: Unknown    Unable to Pay for Housing in the Last Year: Patient refused    Number of Places Lived in the Last Year: 1    Unstable Housing in the Last Year: Patient refused     Past Surgical History:   Procedure Laterality Date    BREAST CYST ASPIRATION Left     left wrist      arthritis    TUBAL LIGATION       Family History   Problem Relation Age of Onset    Eczema Neg Hx     Lupus Neg Hx      "Psoriasis Neg Hx     Melanoma Neg Hx        Review of patient's allergies indicates:  No Known Allergies    Current Outpatient Medications:     ascorbate calcium, vitamin C, 500 mg Tab, Vitamin C 500 mg tablet  Take 1 tablet every day by oral route., Disp: , Rfl:     calcium-vitamin D3 (OS-FERNANDA 500 + D3) 500 mg-5 mcg (200 unit) per tablet, Take 1 tablet by mouth 2 (two) times daily with meals., Disp: , Rfl:     famotidine (PEPCID) 40 MG tablet, Take 1 tablet (40 mg total) by mouth once daily., Disp: 30 tablet, Rfl: 11    magnesium 200 mg Tab, magnesium, Disp: , Rfl:     multivit-min-iron-FA-K.gin 18 mg iron-400 mcg-50 mg Tab, Take 1 tablet by mouth once daily. , Disp: , Rfl:     mupirocin (BACTROBAN) 2 % ointment, Apply topically 3 (three) times daily., Disp: 30 g, Rfl: 0    pantoprazole (PROTONIX) 40 MG tablet, Take 1 tablet (40 mg total) by mouth once daily., Disp: 90 tablet, Rfl: 1    rosuvastatin (CRESTOR) 5 MG tablet, Take 1 tablet (5 mg total) by mouth once daily., Disp: 90 tablet, Rfl: 1    sertraline (ZOLOFT) 50 MG tablet, Take 1 tablet (50 mg total) by mouth once daily., Disp: 90 tablet, Rfl: 0    Review of Systems   Constitutional:  Negative for chills, fever and unexpected weight change.   Respiratory:  Negative for cough and shortness of breath.    Cardiovascular:  Negative for chest pain, palpitations and leg swelling.   Gastrointestinal:  Positive for abdominal pain. Negative for diarrhea, nausea and vomiting.   Genitourinary:  Negative for difficulty urinating, hematuria and vaginal bleeding.   Musculoskeletal:  Negative for arthralgias.   Skin:  Negative for rash.   Neurological:  Negative for dizziness, weakness and headaches.   Psychiatric/Behavioral:  Positive for agitation. Negative for sleep disturbance. The patient is not nervous/anxious.         Objective:      Vitals:    05/31/23 0907   BP: 110/70   Pulse: 66   SpO2: 99%   Weight: 50.2 kg (110 lb 9.6 oz)   Height: 5' 3" (1.6 m)     Physical " Exam  Vitals and nursing note reviewed.   Constitutional:       General: She is not in acute distress.     Appearance: Normal appearance. She is well-developed.   Eyes:      Conjunctiva/sclera: Conjunctivae normal.      Pupils: Pupils are equal, round, and reactive to light.   Neck:      Vascular: No JVD.   Cardiovascular:      Rate and Rhythm: Normal rate and regular rhythm.      Heart sounds: No murmur heard.  Pulmonary:      Effort: Pulmonary effort is normal.      Breath sounds: Normal breath sounds.   Abdominal:      General: Bowel sounds are normal.      Palpations: Abdomen is soft.      Tenderness: There is abdominal tenderness in the epigastric area. There is no right CVA tenderness or left CVA tenderness.   Musculoskeletal:         General: No deformity. Normal range of motion.      Cervical back: Normal range of motion and neck supple.   Lymphadenopathy:      Cervical: No cervical adenopathy.   Skin:     General: Skin is warm and dry.      Findings: No rash.   Neurological:      Mental Status: She is alert and oriented to person, place, and time.      Gait: Gait normal.   Psychiatric:         Speech: Speech normal.         Behavior: Behavior normal.         Assessment:       1. Dyslipidemia    2. Major depressive disorder with single episode, in remission    3. Hyperlipidemia, unspecified hyperlipidemia type    4. Gastroesophageal reflux disease, unspecified whether esophagitis present    5. Abdominal pain, unspecified abdominal location    6. Nausea         Plan:       Dyslipidemia    Major depressive disorder with single episode, in remission  Comments:  continue zoloft  Orders:  -     sertraline (ZOLOFT) 50 MG tablet; Take 1 tablet (50 mg total) by mouth once daily.  Dispense: 90 tablet; Refill: 0    Hyperlipidemia, unspecified hyperlipidemia type  Comments:  crestor   Orders:  -     rosuvastatin (CRESTOR) 5 MG tablet; Take 1 tablet (5 mg total) by mouth once daily.  Dispense: 90 tablet; Refill:  1    Gastroesophageal reflux disease, unspecified whether esophagitis present  Comments:  continue protonix. pepcid at night    Abdominal pain, unspecified abdominal location  Comments:  ultrasound ordered  Orders:  -     US Abdomen Complete; Future; Expected date: 05/31/2023    Nausea  -     US Abdomen Complete; Future; Expected date: 05/31/2023    Other orders  -     famotidine (PEPCID) 40 MG tablet; Take 1 tablet (40 mg total) by mouth once daily.  Dispense: 30 tablet; Refill: 11      Follow up in about 6 months (around 11/30/2023), or if symptoms worsen or fail to improve, for medication management.        6/12/2023 Bonnie Arita       Pt p/w rigors and weakness. U/A appears positive but possibly contaminated iso chronic nephrostomy  - Will c/w CTX for now as pt w/o hx of resistant infections in past  - Repeat u/a  - f/u UCx but may not be accurate d/t contaminated sample  - ID following. recs appreciated  - f/u BCx  - Trend fever/WBC curve

## 2023-07-06 NOTE — PROGRESS NOTE ADULT - PROBLEM SELECTOR PLAN 4
Pt reportedly had synthroid stopped last week by pcp. Will need more collateral  - f/u TSH  - collateral about synthroid

## 2023-07-06 NOTE — PROGRESS NOTE ADULT - PROBLEM SELECTOR PLAN 3
Pt reports dysphagia to solids not liquids. Reports sensation of food stuck in lower esophagus/chest w/ regurgitation ongoing intermittently for a few weeks/months  - Per pt request will order liquid diet  - S&S evaluation   - Esophagram

## 2023-07-06 NOTE — PROGRESS NOTE ADULT - SUBJECTIVE AND OBJECTIVE BOX
HEMODIALYSIS NOTE ---Date of Service 07-06-23 @ 15:37  --------------------------------------------------------------------------------  Chief Complaint: ESRD/Ongoing hemodialysis requirement    24 hour events/subjective:  feeling weak and nauseous       PAST HISTORY  --------------------------------------------------------------------------------  No significant changes to PMH, PSH, FHx, SHx, unless otherwise noted    ALLERGIES & MEDICATIONS  --------------------------------------------------------------------------------  Allergies    No Known Allergies    Intolerances      Standing Inpatient Medications  amLODIPine   Tablet 5 milliGRAM(s) Oral daily  atorvastatin 10 milliGRAM(s) Oral at bedtime  calcium carbonate 1250 mG  + Vitamin D (OsCal 500 + D) 1 Tablet(s) Oral daily  cefTRIAXone   IVPB 1000 milliGRAM(s) IV Intermittent every 24 hours  chlorhexidine 2% Cloths 1 Application(s) Topical daily  cyanocobalamin 1000 MICROGram(s) Oral daily  heparin   Injectable 5000 Unit(s) SubCutaneous every 12 hours  pantoprazole    Tablet 40 milliGRAM(s) Oral every 12 hours  polyethylene glycol 3350 17 Gram(s) Oral daily    PRN Inpatient Medications  acetaminophen     Tablet .. 650 milliGRAM(s) Oral every 6 hours PRN  melatonin 3 milliGRAM(s) Oral at bedtime PRN  ondansetron Injectable 4 milliGRAM(s) IV Push every 8 hours PRN      VITALS/PHYSICAL EXAM  --------------------------------------------------------------------------------  T(C): 37.3 (07-06-23 @ 15:10), Max: 37.8 (07-05-23 @ 21:12)  HR: 101 (07-06-23 @ 15:10) (94 - 103)  BP: 148/68 (07-06-23 @ 15:10) (146/54 - 168/76)  RR: 16 (07-06-23 @ 15:10) (16 - 18)  SpO2: 94% (07-06-23 @ 05:33) (94% - 94%)  Wt(kg): --  Height (cm): 154.9 (07-04-23 @ 23:45)  Weight (kg): 85 (07-04-23 @ 23:45)  BMI (kg/m2): 35.4 (07-04-23 @ 23:45)  BSA (m2): 1.84 (07-04-23 @ 23:45)  flow 300      07-05-23 @ 07:01  -  07-06-23 @ 07:00  --------------------------------------------------------  IN: 125 mL / OUT: 510 mL / NET: -385 mL    07-06-23 @ 07:01  -  07-06-23 @ 15:37  --------------------------------------------------------  IN: 800 mL / OUT: 900 mL / NET: -100 mL      Physical Exam:  	Gen: NAD, well-appearing  	HEENT: PERRL, supple neck, clear oropharynx  	Pulm: CTA B/L  	CV: RRR, S1S2; no rub  	Abd: +BS, soft, nontender/nondistended  	: No suprapubic tenderness  	UE: Warm, FROM, no clubbing, intact strength; no edema; no asterixis  	LE: Warm, FROM, no clubbing, intact strength; no edema  	Vascular access: pc    LABS/STUDIES  --------------------------------------------------------------------------------              8.1    14.07 >-----------<  364      [07-06-23 @ 11:57]              25.4     137  |  95  |  23  ----------------------------<  110      [07-06-23 @ 11:57]  3.7   |  24  |  6.43        Ca     9.0     [07-06-23 @ 11:57]      Mg     1.90     [07-06-23 @ 11:57]      Phos  4.4     [07-06-23 @ 11:57]    TPro  6.0  /  Alb  3.2  /  TBili  0.3  /  DBili  x   /  AST  18  /  ALT  7   /  AlkPhos  62  [07-05-23 @ 06:25]          Iron 32, TIBC 210, %sat 15      [06-07-23 @ 07:04]  Ferritin 151      [06-07-23 @ 07:04]  PTH -- (Ca --)      [06-21-23 @ 05:25]   82  TSH 2.33      [07-06-23 @ 11:57]    HBsAb <3.0      [06-08-23 @ 20:50]  HBsAg Nonreact      [06-08-23 @ 20:50]  HBcAb Nonreact      [06-08-23 @ 20:50]  HCV 0.07, Nonreact      [06-08-23 @ 20:50]  HIV Nonreact      [06-07-23 @ 07:04]

## 2023-07-06 NOTE — PROGRESS NOTE ADULT - SUBJECTIVE AND OBJECTIVE BOX
Harshad Joshi, PGY1    BATSHEVA HOBBS  83y  Female    Complaints:  Subjective:     No acute o/n events. Pt denies subj fevers/chills, HA, dizziness, chest pain, palpitations, n/v, abd pain, dysuria, diarrhea      FAMILY HISTORY:  No pertinent family history in first degree relatives      83yVital Signs Last 24 Hrs  T(C): 37.3 (06 Jul 2023 05:33), Max: 37.8 (05 Jul 2023 21:12)  T(F): 99.2 (06 Jul 2023 05:33), Max: 100.1 (05 Jul 2023 21:12)  HR: 94 (06 Jul 2023 05:33) (93 - 103)  BP: 146/76 (06 Jul 2023 05:33) (141/64 - 146/76)  BP(mean): --  RR: 16 (06 Jul 2023 05:33) (16 - 17)  SpO2: 94% (06 Jul 2023 05:33) (94% - 95%)    Parameters below as of 06 Jul 2023 05:33  Patient On (Oxygen Delivery Method): room air          PHYSICAL EXAM:  GENERAL: NAD, well-groomed, well-developed  HEAD:  Atraumatic, Normocephalic  EYES: EOMI, PERRLA, conjunctiva and sclera clear  ENMT: No tonsillar erythema, exudates, or enlargement; Moist mucous membranes, Good dentition, No lesions  NECK: Supple, No JVD, Normal thyroid  NERVOUS SYSTEM:  Alert & Oriented X3, Good concentration; Motor Strength 5/5 B/L upper and lower extremities; DTRs 2+ intact and symmetric  CHEST/LUNG: Clear to percussion bilaterally; No rales, rhonchi, wheezing, or rubs  HEART: Regular rate and rhythm; No murmurs, rubs, or gallops  ABDOMEN: Soft, Nontender, Nondistended; Bowel sounds present  EXTREMITIES:  2+ Peripheral Pulses, No clubbing, cyanosis, or edema  LYMPH: No lymphadenopathy noted  SKIN: No rashes or lesions      Consultant(s) Notes Reviewed:  [x ] YES  [ ] NO  Care Discussed with Consultants/Other Providers [ x] YES  [ ] NO    LABS:                Female  RADIOLOGY & ADDITIONAL TESTS:    Imaging Personally Reviewed:  [ ] YES  [ ] NO    MedsMEDICATIONS  (STANDING):  amLODIPine   Tablet 5 milliGRAM(s) Oral daily  atorvastatin 10 milliGRAM(s) Oral at bedtime  calcium carbonate 1250 mG  + Vitamin D (OsCal 500 + D) 1 Tablet(s) Oral daily  cefTRIAXone   IVPB 1000 milliGRAM(s) IV Intermittent every 24 hours  chlorhexidine 2% Cloths 1 Application(s) Topical daily  cyanocobalamin 1000 MICROGram(s) Oral daily  heparin   Injectable 5000 Unit(s) SubCutaneous every 12 hours  pantoprazole    Tablet 40 milliGRAM(s) Oral every 12 hours  polyethylene glycol 3350 17 Gram(s) Oral daily    MEDICATIONS  (PRN):  acetaminophen     Tablet .. 650 milliGRAM(s) Oral every 6 hours PRN Temp greater or equal to 38C (100.4F), Mild Pain (1 - 3), Moderate Pain (4 - 6)  melatonin 3 milliGRAM(s) Oral at bedtime PRN Insomnia  ondansetron Injectable 4 milliGRAM(s) IV Push every 8 hours PRN Nausea and/or Vomiting      HEALTH ISSUES - PROBLEM Dx:  Acute UTI    Renal insufficiency    Dysphagia    Hypothyroid    HTN (hypertension)    Cough    Prophylactic measure    Medication management

## 2023-07-06 NOTE — CONSULT NOTE ADULT - ASSESSMENT
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 Reports new-onset generalized weakness, and rigors admitted to r/o infection    KINJAL vs ESRD on HD TTS  renal failure 2/2 obstructive uropathy 2/2 recently discovered pelvic mass s/p R nephrostomy tube 6/8/23 started on HD  Nephrologist Dr. Thorpe  outpatient unit: [facility]   last hd 7/4  HD today  consent in chart    anemia  monitor  transfuse to keep hb>8  hold of epo for now give active mass    weakness  work up per team   84 yo f with h/o htn, hypothyroid,  recently discovered pelvic mass causing obstructive uropathy, and renal failure, now with nephrostomy tube and dialysis t/th/sa. 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 Thorpe  outpatient unit: [facility]   last hd 7/4  HD today   UF as tolerated.   consent in chart    HTN  Suboptimal  UF as tolerated.     anemia  monitor  transfuse to keep hb>8  hold of epo for now give active mass    weakness  work up per team

## 2023-07-06 NOTE — PROGRESS NOTE ADULT - TIME BILLING
Reviewing past chart, lab data, images, updating PCP
Reviewing past chart, lab data, images, updating PCP

## 2023-07-07 ENCOUNTER — APPOINTMENT (OUTPATIENT)
Dept: GYNECOLOGIC ONCOLOGY | Facility: CLINIC | Age: 83
End: 2023-07-07
Payer: MEDICARE

## 2023-07-07 ENCOUNTER — APPOINTMENT (OUTPATIENT)
Dept: HEMATOLOGY ONCOLOGY | Facility: CLINIC | Age: 83
End: 2023-07-07

## 2023-07-07 DIAGNOSIS — D64.9 ANEMIA, UNSPECIFIED: ICD-10-CM

## 2023-07-07 DIAGNOSIS — B37.0 CANDIDAL STOMATITIS: ICD-10-CM

## 2023-07-07 DIAGNOSIS — N13.9 OBSTRUCTIVE AND REFLUX UROPATHY, UNSPECIFIED: ICD-10-CM

## 2023-07-07 LAB
ANION GAP SERPL CALC-SCNC: 14 MMOL/L — SIGNIFICANT CHANGE UP (ref 7–14)
BUN SERPL-MCNC: 17 MG/DL — SIGNIFICANT CHANGE UP (ref 7–23)
CALCIUM SERPL-MCNC: 9 MG/DL — SIGNIFICANT CHANGE UP (ref 8.4–10.5)
CHLORIDE SERPL-SCNC: 100 MMOL/L — SIGNIFICANT CHANGE UP (ref 98–107)
CO2 SERPL-SCNC: 25 MMOL/L — SIGNIFICANT CHANGE UP (ref 22–31)
CREAT SERPL-MCNC: 4.55 MG/DL — HIGH (ref 0.5–1.3)
CULTURE RESULTS: SIGNIFICANT CHANGE UP
EGFR: 9 ML/MIN/1.73M2 — LOW
GLUCOSE SERPL-MCNC: 95 MG/DL — SIGNIFICANT CHANGE UP (ref 70–99)
HBV CORE AB SER-ACNC: SIGNIFICANT CHANGE UP
HBV SURFACE AB SER-ACNC: <3 MIU/ML — LOW
HBV SURFACE AG SER-ACNC: SIGNIFICANT CHANGE UP
HCT VFR BLD CALC: 24.9 % — LOW (ref 34.5–45)
HCV AB S/CO SERPL IA: 0.05 S/CO — SIGNIFICANT CHANGE UP (ref 0–0.99)
HCV AB SERPL-IMP: SIGNIFICANT CHANGE UP
HGB BLD-MCNC: 7.8 G/DL — LOW (ref 11.5–15.5)
MAGNESIUM SERPL-MCNC: 2.1 MG/DL — SIGNIFICANT CHANGE UP (ref 1.6–2.6)
MCHC RBC-ENTMCNC: 29.4 PG — SIGNIFICANT CHANGE UP (ref 27–34)
MCHC RBC-ENTMCNC: 31.3 GM/DL — LOW (ref 32–36)
MCV RBC AUTO: 94 FL — SIGNIFICANT CHANGE UP (ref 80–100)
NRBC # BLD: 0 /100 WBCS — SIGNIFICANT CHANGE UP (ref 0–0)
NRBC # FLD: 0 K/UL — SIGNIFICANT CHANGE UP (ref 0–0)
PHOSPHATE SERPL-MCNC: 3.5 MG/DL — SIGNIFICANT CHANGE UP (ref 2.5–4.5)
PLATELET # BLD AUTO: 351 K/UL — SIGNIFICANT CHANGE UP (ref 150–400)
POTASSIUM SERPL-MCNC: 3.8 MMOL/L — SIGNIFICANT CHANGE UP (ref 3.5–5.3)
POTASSIUM SERPL-SCNC: 3.8 MMOL/L — SIGNIFICANT CHANGE UP (ref 3.5–5.3)
RBC # BLD: 2.65 M/UL — LOW (ref 3.8–5.2)
RBC # FLD: 13 % — SIGNIFICANT CHANGE UP (ref 10.3–14.5)
SODIUM SERPL-SCNC: 139 MMOL/L — SIGNIFICANT CHANGE UP (ref 135–145)
SPECIMEN SOURCE: SIGNIFICANT CHANGE UP
WBC # BLD: 12.55 K/UL — HIGH (ref 3.8–10.5)
WBC # FLD AUTO: 12.55 K/UL — HIGH (ref 3.8–10.5)

## 2023-07-07 PROCEDURE — 99222 1ST HOSP IP/OBS MODERATE 55: CPT

## 2023-07-07 PROCEDURE — 99222 1ST HOSP IP/OBS MODERATE 55: CPT | Mod: GC

## 2023-07-07 RX ORDER — SODIUM CHLORIDE 9 MG/ML
1000 INJECTION, SOLUTION INTRAVENOUS
Refills: 0 | Status: DISCONTINUED | OUTPATIENT
Start: 2023-07-07 | End: 2023-07-07

## 2023-07-07 RX ORDER — PANTOPRAZOLE SODIUM 20 MG/1
40 TABLET, DELAYED RELEASE ORAL DAILY
Refills: 0 | Status: DISCONTINUED | OUTPATIENT
Start: 2023-07-07 | End: 2023-07-08

## 2023-07-07 RX ORDER — NYSTATIN 500MM UNIT
500000 POWDER (EA) MISCELLANEOUS EVERY 6 HOURS
Refills: 0 | Status: DISCONTINUED | OUTPATIENT
Start: 2023-07-07 | End: 2023-07-09

## 2023-07-07 RX ADMIN — SODIUM CHLORIDE 100 MILLILITER(S): 9 INJECTION, SOLUTION INTRAVENOUS at 21:09

## 2023-07-07 RX ADMIN — ONDANSETRON 4 MILLIGRAM(S): 8 TABLET, FILM COATED ORAL at 21:55

## 2023-07-07 RX ADMIN — HEPARIN SODIUM 5000 UNIT(S): 5000 INJECTION INTRAVENOUS; SUBCUTANEOUS at 19:14

## 2023-07-07 RX ADMIN — PANTOPRAZOLE SODIUM 40 MILLIGRAM(S): 20 TABLET, DELAYED RELEASE ORAL at 19:14

## 2023-07-07 RX ADMIN — CEFTRIAXONE 100 MILLIGRAM(S): 500 INJECTION, POWDER, FOR SOLUTION INTRAMUSCULAR; INTRAVENOUS at 21:09

## 2023-07-07 RX ADMIN — SIMETHICONE 80 MILLIGRAM(S): 80 TABLET, CHEWABLE ORAL at 11:31

## 2023-07-07 RX ADMIN — Medication 500000 UNIT(S): at 23:40

## 2023-07-07 RX ADMIN — HEPARIN SODIUM 5000 UNIT(S): 5000 INJECTION INTRAVENOUS; SUBCUTANEOUS at 06:52

## 2023-07-07 RX ADMIN — PREGABALIN 1000 MICROGRAM(S): 225 CAPSULE ORAL at 11:31

## 2023-07-07 RX ADMIN — AMLODIPINE BESYLATE 5 MILLIGRAM(S): 2.5 TABLET ORAL at 06:52

## 2023-07-07 RX ADMIN — ATORVASTATIN CALCIUM 10 MILLIGRAM(S): 80 TABLET, FILM COATED ORAL at 21:10

## 2023-07-07 RX ADMIN — PANTOPRAZOLE SODIUM 40 MILLIGRAM(S): 20 TABLET, DELAYED RELEASE ORAL at 06:52

## 2023-07-07 NOTE — PROGRESS NOTE ADULT - ASSESSMENT
82 yo f with h/o htn, hypothyroid,  recently discovered pelvic mass causing obstructive uropathy, and renal failure, now with nephrostomy tube and dialysis t/th/sa. Reports new-onset generalized weakness, and rigors w/ U/a that appears possibly contaminated.  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 Reports new-onset generalized weakness, and rigors w/ U/a that appears possibly contaminated.     7/7: Pt continues to endorse intermittent sudden-onset nausea, well controlled with zofran, and sudden-onset weakness. Pt had scheduled outpatient oncology and gynecology regarding recently discovered pelvic mass, gyn-onc consulted to see inpatient. Vitals stable, WBC 12K continuing on ceftriaxone pending new UA for UTI. Esophagram negative, pt continues to endorse choking on crackers, yogurt. Pt continues to endorse mild rigors after HD.  82 yo f with h/o htn, hypothyroid,  recently discovered pelvic mass causing obstructive uropathy, and renal failure, now with nephrostomy tube and dialysis t/th/sa. Reports new-onset generalized weakness, and rigors w/ U/a that appears possibly contaminated.     7/7: Pt continues to endorse intermittent sudden-onset nausea, well controlled with zofran, and sudden-onset weakness. Pt had scheduled outpatient oncology and gynecology regarding recently discovered pelvic mass, gyn-onc consulted to see inpatient. Vitals stable, WBC 12K continuing on ceftriaxone pending new UA for UTI. Esophagram negative, pt continues to endorse choking on crackers, yogurt. Unclear etiology of dysphagia, as pt is cleared by speech/swallow, swallow study was normal. ENT/GI consulted.

## 2023-07-07 NOTE — CONSULT NOTE ADULT - SUBJECTIVE AND OBJECTIVE BOX
HPI: BATSHEVA HOBBS is a 83year old female with history of HTN, hypothyroid, recently admitted for obstructive uropathy and renal failure 2/2 obstructing pelvic mass, now with nephrostomy tube and dialysis t/th/sa, presented with new-onset generalized weakness, and rigors w/ U/a that appears possibly contaminated.     GI was consulted for gagging sensation with eating, and inability to tolerate PO.    Per patient and daughter's report, patient started to experience "gagging" sensation while eating solid food about 2 weeks ago after hospital discharge. She was tolerating regular diet and liquids right before that. Initially, she was able to take a few bites of solid food before the gagging sensation, and quickly progressed to inability to drink liquids in the past 5 days. Patient described it as "a lid is covering the throat" and she couldn't get the food/drink to pass the throat. She was able to chew and swallow (with the oral phase), and if she was able to get it pass the "gagging" sensation, the food/drink can pass down the esophagus without difficulty. Today, patient discovered that she was able to swallow liquid if she angled her straw to the right, but not when liquid passes down in the midline. Had 1 episode of non-bloody emesis a week ago when she was triggered by the gagging. Only spits up food particles immediately after ingesting, but not regurgitation hours after. (+) new dry cough, and no sputum production or post-nasal drip. (+) Nausea, responding to Zofran. Possible weight loss, but not measured. Denied fever, chills, halitosis, hematemesis, odynophagia, acid reflux, abdominal pain, gas/bloating, diarrhea, constipation, melena, hematochezia or family hx of GI-related cancers.    Patient denied changes in medications (synthroid was d/c'ed 1 week after symptom onset, not before), diet, chemo/radiation, allergies (only seasonal allergy), trauma, or URI symptoms. She had Shiley removal before hospital discharge and experienced pain and swelling in R neck at the same time of onset of "gagging" sensation, but neck pain and swelling resolved after Z-pack x 5 days. Denied change in voice.       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 650 milliGRAM(s) Oral every 6 hours PRN  amLODIPine   Tablet 5 milliGRAM(s) Oral daily  atorvastatin 10 milliGRAM(s) Oral at bedtime  calcium carbonate 1250 mG  + Vitamin D (OsCal 500 + D) 1 Tablet(s) Oral daily  cefTRIAXone   IVPB 1000 milliGRAM(s) IV Intermittent every 24 hours  chlorhexidine 2% Cloths 1 Application(s) Topical daily  cyanocobalamin 1000 MICROGram(s) Oral daily  heparin   Injectable 5000 Unit(s) SubCutaneous every 12 hours  lactated ringers 1000 milliLiter(s) IV Continuous <Continuous>  melatonin 3 milliGRAM(s) Oral at bedtime PRN  ondansetron Injectable 4 milliGRAM(s) IV Push every 8 hours PRN  pantoprazole    Tablet 40 milliGRAM(s) Oral every 12 hours  pantoprazole  Injectable 40 milliGRAM(s) IV Push daily  polyethylene glycol 3350 17 Gram(s) Oral daily  simethicone 80 milliGRAM(s) Chew daily      Social History:   Tobacco: denies  Alcohol: denies  Recreational drugs: denies    Family history:    No pertinent family history in first degree relatives in GI diseases      Denies family history of colon cancer/polyps, stomach cancer/polyps, pancreatic cancer/masses, liver cancer/disease, ovarian cancer and endometrial cancer.    PHYSICAL EXAM:   Vital Signs:  Vital Signs Last 24 Hrs  T(C): 36.7 (07 Jul 2023 12:44), Max: 37.7 (06 Jul 2023 21:30)  T(F): 98.1 (07 Jul 2023 12:44), Max: 99.9 (06 Jul 2023 21:30)  HR: 97 (07 Jul 2023 12:44) (92 - 101)  BP: 151/60 (07 Jul 2023 12:44) (128/50 - 151/60)  BP(mean): --  RR: 17 (07 Jul 2023 12:44) (16 - 18)  SpO2: 94% (07 Jul 2023 12:44) (94% - 95%)    Parameters below as of 07 Jul 2023 12:44  Patient On (Oxygen Delivery Method): room air      Daily       GENERAL: no acute distress  NEURO: alert  HEENT: NCAT, no conjunctival pallor appreciated; (+) white coating on tongue, unable to scrape off. No visible obstruction in the oropharynx  NECK: Soft, full ROM, no LAD, no thyroid enlargements, no focal masses   CHEST: no respiratory distress, no accessory muscle use; (+) dry cough during exam  CARDIAC: regular rate, +S1/S2  ABDOMEN: soft, nontender, no rebound or guarding  EXTREMITIES: warm, well perfused  SKIN: no lesions noted    LABS: reviewed                        7.8    12.55 )-----------( 351      ( 07 Jul 2023 06:18 )             24.9     07-07    139  |  100  |  17  ----------------------------<  95  3.8   |  25  |  4.55<H>    Ca    9.0      07 Jul 2023 06:18  Phos  3.5     07-07  Mg     2.10     07-07      Culture - Urine (collected 06 Jul 2023 04:13)  Source: Clean Catch Clean Catch (Midstream)  Final Report (07 Jul 2023 08:44):    <10,000 CFU/mL Normal Urogenital Lisy    Culture - Blood (collected 04 Jul 2023 21:44)  Source: .Blood Blood-Peripheral  Preliminary Report (07 Jul 2023 03:01):    No growth at 48 Hours    Culture - Blood (collected 04 Jul 2023 21:29)  Source: .Blood Blood-Venous  Preliminary Report (07 Jul 2023 03:01):    No growth at 48 Hours    Culture - Urine (collected 04 Jul 2023 19:41)  Source: Clean Catch Clean Catch (Midstream)  Final Report (05 Jul 2023 23:45):    >=3 organisms. Probable collection contamination.        Diagnostic Studies: see sunrise for full report

## 2023-07-07 NOTE — PROGRESS NOTE ADULT - ASSESSMENT
82 y/o F PMhx HTN, hypothyroidism, hydronephrosis c/b renal failure 2/2 obstructive uropathy 2/2 recently discovered pelvic mass s/p R nephrostomy tube 6/8 and HD via R tunneled permcath who presented w/ generalized weakness, rigors, nausea, poor PO intake.     leukocytosis, generalized malaise, r/o UTI  CT abd/pelvis- No bowel obstruction. Resolution of R-sided hydroureteronephrosis s/p R nephrostomy tube placement. Unchanged mild left hydroureteronephrosis. Redemonstration of mass emanating from the vaginal cuff   RVP negative  Recommendations  f/u urine culture  c/w ceftriaxone for now  f/u blood cultures- NGTD  would obtain CT chest given persistent difficulty keeping anything down (aside from water) which pt describes as     Dr. Aminta Atwood will be covering 7/8-7/9     Shree Newton M.D.  [facility], Division of Infectious Diseases  [phone]  After 5pm on weekdays and all day on weekends - please call [phone]  84 y/o F PMhx HTN, hypothyroidism, hydronephrosis c/b renal failure 2/2 obstructive uropathy 2/2 recently discovered pelvic mass s/p R nephrostomy tube 6/8 and HD via R tunneled permcath who presented w/ generalized weakness, rigors, nausea, poor PO intake.     leukocytosis, generalized malaise, r/o UTI  CT abd/pelvis- No bowel obstruction. Resolution of R-sided hydroureteronephrosis s/p R nephrostomy tube placement. Unchanged mild left hydroureteronephrosis. Redemonstration of mass emanating from the vaginal cuff   RVP negative  Recommendations  f/u urine culture  c/w ceftriaxone for now  f/u blood cultures- NGTD  would obtain CT chest given persistent difficulty keeping anything down (aside from water) which pt describes as a lid thats prevents anything from going down    Dr. Aminta Atwood will be covering 7/8-7/9     Shree Newton M.D.  [facility], Division of Infectious Diseases  [phone]  After 5pm on weekdays and all day on weekends - please call [phone]  82 y/o F PMhx HTN, hypothyroidism, hydronephrosis c/b renal failure 2/2 obstructive uropathy 2/2 recently discovered pelvic mass s/p R nephrostomy tube 6/8 and HD via R tunneled permcath who presented w/ generalized weakness, rigors, nausea, poor PO intake.     leukocytosis, generalized malaise, r/o UTI  CT abd/pelvis- No bowel obstruction. Resolution of R-sided hydroureteronephrosis s/p R nephrostomy tube placement. Unchanged mild left hydroureteronephrosis. Redemonstration of mass emanating from the vaginal cuff   RVP negative  Recommendations  f/u urine culture  c/w ceftriaxone for now  f/u blood cultures- NGTD  would obtain CT chest to rule out extrinsic compression given persistent difficulty keeping anything down (aside from water); also noted prior CT abd/pelvis was concerning for metastatic disease    Dr. Aminta Atwood will be covering 7/8-7/9     Shree Newton M.D.  [facility], Division of Infectious Diseases  [phone]  After 5pm on weekdays and all day on weekends - please call [phone]

## 2023-07-07 NOTE — PROGRESS NOTE ADULT - SUBJECTIVE AND OBJECTIVE BOX
[facility], Division of Infectious Diseases  JARED Marte Y. Patel, S. Shah, G. Aman  [phone]  ([phone] - weekdays after 5pm and weekends)    Name: BATSHEVA HOBBS  Age/Gender: 83y Female  MRN: [id number]    Interval History:  Notes reviewed.   No concerning overnight events.  Afebrile.     Allergies: No Known Allergies      Objective:  Vitals:   T(F): 97.8 (07-07-23 @ 05:40), Max: 99.9 (07-06-23 @ 21:30)  HR: 92 (07-07-23 @ 05:40) (92 - 101)  BP: 149/65 (07-07-23 @ 05:40) (128/50 - 168/76)  RR: 18 (07-07-23 @ 05:40) (16 - 18)  SpO2: 95% (07-07-23 @ 05:40) (95% - 95%)  Physical Examination:  General: no acute distress  HEENT: anicteric  Cardio: S1, S2, normal rate, no murmur  Resp: clear to auscultation bilaterally  Abd: soft, NT, ND  Ext: no LE edema  Skin: warm, dry  Psych: appropriate affect and mood for situation    Laboratory Studies:  CBC:                       7.8    12.55 )-----------( 351      ( 07 Jul 2023 06:18 )             24.9     WBC Trend:  12.55 07-07-23 @ 06:18  14.07 07-06-23 @ 11:57  11.94 07-05-23 @ 06:25  12.41 07-04-23 @ 17:15    CMP: 07-07    139  |  100  |  17  ----------------------------<  95  3.8   |  25  |  4.55<H>    Ca    9.0      07 Jul 2023 06:18  Phos  3.5     07-07  Mg     2.10     07-07            Urinalysis Basic - ( 07 Jul 2023 06:18 )    Color: x / Appearance: x / SG: x / pH: x  Gluc: 95 mg/dL / Ketone: x  / Bili: x / Urobili: x   Blood: x / Protein: x / Nitrite: x   Leuk Esterase: x / RBC: x / WBC x   Sq Epi: x / Non Sq Epi: x / Bacteria: x      Microbiology: reviewed     Culture - Urine (collected 07-06-23 @ 04:13)  Source: Clean Catch Clean Catch (Midstream)  Final Report (07-07-23 @ 08:44):    <10,000 CFU/mL Normal Urogenital Lisy    Culture - Blood (collected 07-04-23 @ 21:44)  Source: .Blood Blood-Peripheral  Preliminary Report (07-07-23 @ 03:01):    No growth at 48 Hours    Culture - Blood (collected 07-04-23 @ 21:29)  Source: .Blood Blood-Venous  Preliminary Report (07-07-23 @ 03:01):    No growth at 48 Hours    Culture - Urine (collected 07-04-23 @ 19:41)  Source: Clean Catch Clean Catch (Midstream)  Final Report (07-05-23 @ 23:45):    >=3 organisms. Probable collection contamination.        Radiology: reviewed     Medications:  acetaminophen     Tablet .. 650 milliGRAM(s) Oral every 6 hours PRN  amLODIPine   Tablet 5 milliGRAM(s) Oral daily  atorvastatin 10 milliGRAM(s) Oral at bedtime  calcium carbonate 1250 mG  + Vitamin D (OsCal 500 + D) 1 Tablet(s) Oral daily  cefTRIAXone   IVPB 1000 milliGRAM(s) IV Intermittent every 24 hours  chlorhexidine 2% Cloths 1 Application(s) Topical daily  cyanocobalamin 1000 MICROGram(s) Oral daily  heparin   Injectable 5000 Unit(s) SubCutaneous every 12 hours  melatonin 3 milliGRAM(s) Oral at bedtime PRN  ondansetron Injectable 4 milliGRAM(s) IV Push every 8 hours PRN  pantoprazole    Tablet 40 milliGRAM(s) Oral every 12 hours  polyethylene glycol 3350 17 Gram(s) Oral daily  simethicone 80 milliGRAM(s) Chew daily    Antimicrobials:  cefTRIAXone   IVPB 1000 milliGRAM(s) IV Intermittent every 24 hours   [facility], Division of Infectious Diseases  JARED Marte Y. Patel, S. Shah, G. Aman  [phone]  ([phone] - weekdays after 5pm and weekends)    Name: BATSHEVA HOBBS  Age/Gender: 83y Female  MRN: [id number]    Interval History:  Notes reviewed.   No concerning overnight events.  Afebrile.   continues having difficulty keeping food or liquids down besides water  daughter at bedside    Allergies: No Known Allergies      Objective:  Vitals:   T(F): 97.8 (07-07-23 @ 05:40), Max: 99.9 (07-06-23 @ 21:30)  HR: 92 (07-07-23 @ 05:40) (92 - 101)  BP: 149/65 (07-07-23 @ 05:40) (128/50 - 168/76)  RR: 18 (07-07-23 @ 05:40) (16 - 18)  SpO2: 95% (07-07-23 @ 05:40) (95% - 95%)  Physical Examination:  General: no acute distress  HEENT: anicteric  Cardio: S1, S2, normal rate  Resp: breathing comfortably on RA  Abd: soft, NT, ND  Back: R nephrostomy tube  Ext: no LE edema  Skin: warm, dry    Laboratory Studies:  CBC:                       7.8    12.55 )-----------( 351      ( 07 Jul 2023 06:18 )             24.9     WBC Trend:  12.55 07-07-23 @ 06:18  14.07 07-06-23 @ 11:57  11.94 07-05-23 @ 06:25  12.41 07-04-23 @ 17:15    CMP: 07-07    139  |  100  |  17  ----------------------------<  95  3.8   |  25  |  4.55<H>    Ca    9.0      07 Jul 2023 06:18  Phos  3.5     07-07  Mg     2.10     07-07            Urinalysis Basic - ( 07 Jul 2023 06:18 )    Color: x / Appearance: x / SG: x / pH: x  Gluc: 95 mg/dL / Ketone: x  / Bili: x / Urobili: x   Blood: x / Protein: x / Nitrite: x   Leuk Esterase: x / RBC: x / WBC x   Sq Epi: x / Non Sq Epi: x / Bacteria: x      Microbiology: reviewed     Culture - Urine (collected 07-06-23 @ 04:13)  Source: Clean Catch Clean Catch (Midstream)  Final Report (07-07-23 @ 08:44):    <10,000 CFU/mL Normal Urogenital Lisy    Culture - Blood (collected 07-04-23 @ 21:44)  Source: .Blood Blood-Peripheral  Preliminary Report (07-07-23 @ 03:01):    No growth at 48 Hours    Culture - Blood (collected 07-04-23 @ 21:29)  Source: .Blood Blood-Venous  Preliminary Report (07-07-23 @ 03:01):    No growth at 48 Hours    Culture - Urine (collected 07-04-23 @ 19:41)  Source: Clean Catch Clean Catch (Midstream)  Final Report (07-05-23 @ 23:45):    >=3 organisms. Probable collection contamination.        Radiology: reviewed     Medications:  acetaminophen     Tablet .. 650 milliGRAM(s) Oral every 6 hours PRN  amLODIPine   Tablet 5 milliGRAM(s) Oral daily  atorvastatin 10 milliGRAM(s) Oral at bedtime  calcium carbonate 1250 mG  + Vitamin D (OsCal 500 + D) 1 Tablet(s) Oral daily  cefTRIAXone   IVPB 1000 milliGRAM(s) IV Intermittent every 24 hours  chlorhexidine 2% Cloths 1 Application(s) Topical daily  cyanocobalamin 1000 MICROGram(s) Oral daily  heparin   Injectable 5000 Unit(s) SubCutaneous every 12 hours  melatonin 3 milliGRAM(s) Oral at bedtime PRN  ondansetron Injectable 4 milliGRAM(s) IV Push every 8 hours PRN  pantoprazole    Tablet 40 milliGRAM(s) Oral every 12 hours  polyethylene glycol 3350 17 Gram(s) Oral daily  simethicone 80 milliGRAM(s) Chew daily    Antimicrobials:  cefTRIAXone   IVPB 1000 milliGRAM(s) IV Intermittent every 24 hours

## 2023-07-07 NOTE — CONSULT NOTE ADULT - ASSESSMENT
A/P: 84yo  w/ h/o HTN, HLD, hypothryoidism & Cervical SSC/VAIN3 s/p MILVIA/BSO () presented to the ED with overall fatigue and lab results concerning for UTI. Gyn oncology consulted for findings of 4.6 cm pelvic mass, lymphadenopathy, & BL hydronephrosis on imaging as there is concern for recurrence of disease. On PET CT, there is a FDG avid lobular soft tissue mass at the level of the vaginal cuff, extending into the vagina on PET CT, unchanged in size when compared to recent CT abdomen and pelvis. 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 There are also FDG avid retroperitoneal and pelvic lymph nodes consistent with metastatic disease, mildly FDG avid right axillary and subcarinal lymph nodes that are nonspecific, and a new small non-FDG avid pericardial effusion. Unchanged left mild hydronephrosis and right sided resolved hydronephrosis. GYN exam deferred at this time as patient does not have presenting gyn complaints of vaginal bleeding. Previous exam during last admission was significant for friable tissue at the vaginal cuff. Primary team to identify source of infectious symptoms at time of admission. Plan for straight cath per ID notes.     -no acute gyn onc interventions indicated at this time.   -patient originally scheduled to see Dr. Pretty in office today, rescheduled to 23.  -imaging from previous admission as well as recent PET Scan reviewed with patient and explained to patient and her two daughters expressed understanding of the current plan for systemic therapy given active lymph nodes observed in the perinephric region.   -surgery is not indicated at this time as there is apparent spread of disease outside of the pelvis.   -Care plan to be coordinated with Medical Oncology and Radiation Oncology, preferably while inpatient, in case port placements are needed and can be placed while inpatient. Will defer to both teams for further recommendations.   -gyn onc team to continue to follow while inpatient.     Patient seen and counseled with Dr. Garcia, PGY-6, GYN ONC Fellow  Salome Banuelos, PGY3 A/P: 84yo  w/ h/o HTN, HLD, hypothryoidism & Cervical SSC/VAIN3 s/p MILVIA/BSO () presented to the ED with overall fatigue and lab results concerning for UTI. Gyn oncology consulted for findings of 4.6 cm pelvic mass, lymphadenopathy, & BL hydronephrosis on imaging as there is concern for recurrence of disease. On PET CT, there is a FDG avid lobular soft tissue mass at the level of the vaginal cuff, extending into the vagina on PET CT, unchanged in size when compared to recent CT abdomen and pelvis. 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 There are also FDG avid retroperitoneal and pelvic lymph nodes consistent with metastatic disease, mildly FDG avid right axillary and subcarinal lymph nodes that are nonspecific, and a new small non-FDG avid pericardial effusion. Unchanged left mild hydronephrosis and right sided resolved hydronephrosis. GYN exam deferred at this time as patient does not have presenting gyn complaints of vaginal bleeding. Previous exam during last admission was significant for friable tissue at the vaginal cuff. Primary team to identify source of infectious symptoms at time of admission. Plan for straight cath per ID notes.     -no acute gyn onc interventions indicated at this time.   -patient originally scheduled to see Dr. Pretty in office today, rescheduled to 23, and Med Onc, rescheduled to 7/10/23  -imaging from previous admission as well as recent PET Scan reviewed with patient and explained to patient and her two daughters expressed understanding of the current plan for systemic therapy given FDG avid right axillary and subcarinal lymph nodes.   -surgery is not indicated at this time as there is apparent spread of disease into vagina   -Care plan to be coordinated with Medical Oncology and Radiation Oncology. Will defer to Med Onc for further recommendations regarding options for initiation of treatment and port placement.     Patient seen and counseled with Dr. Garcia, PGY-6, GYN ONC Fellow  Salome Banuelos, PGY3

## 2023-07-07 NOTE — CONSULT NOTE ADULT - PROBLEM SELECTOR RECOMMENDATION 9
- Nystatin swish and swallow TID for 7 days  - Consider GI consult to EGD   - Case discussed with Dr. Blake

## 2023-07-07 NOTE — PROGRESS NOTE ADULT - ASSESSMENT
82 yo f with h/o htn, hypothyroid,  recently discovered pelvic mass causing obstructive uropathy, and renal failure, now with nephrostomy tube and dialysis t/th/sa. Reports new-onset generalized weakness, and rigors admitted to r/o infection    KINJAL vs ESRD on HD TTS  renal failure 2/2 obstructive uropathy 2/2 recently discovered pelvic mass s/p R nephrostomy tube 6/8/23 started on HD  Nephrologist Dr. Thorpe  outpatient unit: [facility]   currently still dialysis dependent   last hd 7/6, hd tmr  UF as tolerated.   consent in chart    HTN  acceptable  UF as tolerated.   monitor    anemia  monitor  transfuse to keep hb>8  hold of epo for now given active mass    weakness  work up per team

## 2023-07-07 NOTE — PROGRESS NOTE ADULT - SUBJECTIVE AND OBJECTIVE BOX
[facility]   MD NIKITA MARRERO MD KRISTINE SOLTANPOUR, AMARILIS GRAMAJO    TEL:  OFFICE: [phone]  From 5pm-7am Answering Service [phone]    -- RENAL FOLLOW UP NOTE ---Date of Service 07-07-23 @ 15:52    Patient is a 83y old  Female who presents with a chief complaint of uti (07 Jul 2023 14:45)      Patient seen and examined at bedside. No chest pain/sob    VITALS:  T(F): 98.1 (07-07-23 @ 12:44), Max: 99.9 (07-06-23 @ 21:30)  HR: 97 (07-07-23 @ 12:44)  BP: 151/60 (07-07-23 @ 12:44)  RR: 17 (07-07-23 @ 12:44)  SpO2: 94% (07-07-23 @ 12:44)  Wt(kg): --    07-06 @ 07:01  -  07-07 @ 07:00  --------------------------------------------------------  IN: 800 mL / OUT: 900 mL / NET: -100 mL          PHYSICAL EXAM:  General: NAD  Neck: No JVD  Respiratory: CTAB, no wheezes, rales or rhonchi  Cardiovascular: S1, S2, RRR  Gastrointestinal: BS+, soft, NT/ND  Extremities: No peripheral edema    Hospital Medications:   MEDICATIONS  (STANDING):  amLODIPine   Tablet 5 milliGRAM(s) Oral daily  atorvastatin 10 milliGRAM(s) Oral at bedtime  calcium carbonate 1250 mG  + Vitamin D (OsCal 500 + D) 1 Tablet(s) Oral daily  cefTRIAXone   IVPB 1000 milliGRAM(s) IV Intermittent every 24 hours  chlorhexidine 2% Cloths 1 Application(s) Topical daily  cyanocobalamin 1000 MICROGram(s) Oral daily  heparin   Injectable 5000 Unit(s) SubCutaneous every 12 hours  lactated ringers 1000 milliLiter(s) (100 mL/Hr) IV Continuous <Continuous>  pantoprazole    Tablet 40 milliGRAM(s) Oral every 12 hours  pantoprazole  Injectable 40 milliGRAM(s) IV Push daily  polyethylene glycol 3350 17 Gram(s) Oral daily  simethicone 80 milliGRAM(s) Chew daily      LABS:  07-07    139  |  100  |  17  ----------------------------<  95  3.8   |  25  |  4.55<H>    Ca    9.0      07 Jul 2023 06:18  Phos  3.5     07-07  Mg     2.10     07-07      Creatinine Trend: 4.55 <--, 6.43 <--, 4.29 <--, 2.69 <--    Phosphorus: 3.5 mg/dL (07-07 @ 06:18)                              7.8    12.55 )-----------( 351      ( 07 Jul 2023 06:18 )             24.9     Urine Studies:  Urinalysis - [07-07-23 @ 06:18]      Color  / Appearance  / SG  / pH       Gluc 95 / Ketone   / Bili  / Urobili        Blood  / Protein  / Leuk Est  / Nitrite       RBC  / WBC  / Hyaline  / Gran  / Sq Epi  / Non Sq Epi  / Bacteria       Iron 32, TIBC 210, %sat 15      [06-07-23 @ 07:04]  Ferritin 151      [06-07-23 @ 07:04]  PTH -- (Ca --)      [06-21-23 @ 05:25]   82  TSH 2.33      [07-06-23 @ 11:57]    HBsAb <3.0      [07-06-23 @ 11:57]  HBsAg Nonreact      [07-06-23 @ 11:57]  HBcAb Nonreact      [07-06-23 @ 11:57]  HCV 0.05, Nonreact      [07-06-23 @ 11:57]      RADIOLOGY & ADDITIONAL STUDIES:

## 2023-07-07 NOTE — PROGRESS NOTE ADULT - SUBJECTIVE AND OBJECTIVE BOX
Vin Church, PGY1    BATSHEVA HOBBS  83y  Female    Complaints/Subjective: No acute o/n events. Pt denies subj fevers/chills, HA, dizziness, chest pain, palpitations, n/v, abd pain, dysuria, diarrhea    FAMILY HISTORY:  No pertinent family history in first degree relatives      Vital Signs Last 24 Hrs  T(C): 36.6 (07 Jul 2023 05:40), Max: 37.7 (06 Jul 2023 21:30)  T(F): 97.8 (07 Jul 2023 05:40), Max: 99.9 (06 Jul 2023 21:30)  HR: 92 (07 Jul 2023 05:40) (92 - 101)  BP: 149/65 (07 Jul 2023 05:40) (128/50 - 168/76)  BP(mean): --  RR: 18 (07 Jul 2023 05:40) (16 - 18)  SpO2: 95% (07 Jul 2023 05:40) (95% - 95%)    Parameters below as of 07 Jul 2023 05:40  Patient On (Oxygen Delivery Method): room air        PHYSICAL EXAM:  GEN: Awake, alert, NAD.   HEENT:  Head atraumatic and normocephalic.  EOMI, PERRLA, conjunctiva and sclera non-icteric, no signs of nystagmus. Hearing intact, oral mucosa moist with good dentition, no tonsillar erythema, exudates, or enlargement. Neck supple, no JVD, normal thyroid.  CV: Regular rate and rhythm. No murmurs, rubs, or gallops.  RESP: Chest wall symmetric, clear to auscultation bilaterally. No rales, rhonchi, wheezing, or rubs.  AB: Soft, nontender, nondistended, bowel sounds present, no visible lesions or scars. No masses, hepatomegaly or splenomegaly.  MSK: Upper and lower extremities atraumatic in appearance without tenderness or deformity, no swelilng or erythema. Muscle strength 5/5 bilaterally. Pulses palpable.  NEURO: AOx4 with normal speech. Motor function and sensation intact. Memory is normal and thought process is intact.  PSYCH: Appropriate mood and affect.  SKIN: No rash or lesions.    Consultant(s) Notes Reviewed:  [x ] YES  [ ] NO  Care Discussed with Consultants/Other Providers [ x] YES  [ ] NO    LABS:      RADIOLOGY & ADDITIONAL TESTS:    Imaging Personally Reviewed:  [X] YES  [ ] NO    MEDICATIONS  (STANDING):  amLODIPine   Tablet 5 milliGRAM(s) Oral daily  atorvastatin 10 milliGRAM(s) Oral at bedtime  calcium carbonate 1250 mG  + Vitamin D (OsCal 500 + D) 1 Tablet(s) Oral daily  cefTRIAXone   IVPB 1000 milliGRAM(s) IV Intermittent every 24 hours  chlorhexidine 2% Cloths 1 Application(s) Topical daily  cyanocobalamin 1000 MICROGram(s) Oral daily  heparin   Injectable 5000 Unit(s) SubCutaneous every 12 hours  pantoprazole    Tablet 40 milliGRAM(s) Oral every 12 hours  polyethylene glycol 3350 17 Gram(s) Oral daily  simethicone 80 milliGRAM(s) Chew daily    MEDICATIONS  (PRN):  acetaminophen     Tablet .. 650 milliGRAM(s) Oral every 6 hours PRN Temp greater or equal to 38C (100.4F), Mild Pain (1 - 3), Moderate Pain (4 - 6)  melatonin 3 milliGRAM(s) Oral at bedtime PRN Insomnia  ondansetron Injectable 4 milliGRAM(s) IV Push every 8 hours PRN Nausea and/or Vomiting      HEALTH ISSUES - PROBLEM Dx:  Acute UTI    Renal insufficiency    Hypothyroid    HTN (hypertension)    Medication management    Cough    Prophylactic measure    Dysphagia             Vin Church, PGY1    BATSHEVA HOBBS  83y  Female    Complaints/Subjective: Pt reports no nausea overnight, last BM 5 days ago 2/2 to lack of po intake. Pt endorses mild intermittent R ab pain, no suprapubic pain. Pt denies subj fevers/chills, HA, dizziness, chest pain, palpitations, dysuria, diarrhea    FAMILY HISTORY:  No pertinent family history in first degree relatives    Vital Signs Last 24 Hrs  T(C): 36.6 (07 Jul 2023 05:40), Max: 37.7 (06 Jul 2023 21:30)  T(F): 97.8 (07 Jul 2023 05:40), Max: 99.9 (06 Jul 2023 21:30)  HR: 92 (07 Jul 2023 05:40) (92 - 101)  BP: 149/65 (07 Jul 2023 05:40) (128/50 - 168/76)  BP(mean): --  RR: 18 (07 Jul 2023 05:40) (16 - 18)  SpO2: 95% (07 Jul 2023 05:40) (95% - 95%)    Parameters below as of 07 Jul 2023 05:40  Patient On (Oxygen Delivery Method): room air    PHYSICAL EXAM:  GENERAL: NAD, well-groomed, well-developed  HEAD:  Atraumatic, Normocephalic  EYES: EOMI, PERRLA, conjunctiva and sclera clear  ENMT: No tonsillar erythema, exudates, or enlargement; Moist mucous membranes, Good dentition, No lesions  NECK: Supple, No JVD, Normal thyroid  NERVOUS SYSTEM:  Alert & Oriented X3, Good concentration; Motor Strength 5/5 B/L upper and lower extremities; DTRs 2+ intact and symmetric, 1+ edema bilaterally ankles  CHEST/LUNG: Clear to percussion bilaterally; No rales, rhonchi, wheezing, or rubs  HEART: Regular rate and rhythm; No murmurs, rubs, or gallops  ABDOMEN: Soft, Nontender, Nondistended; Bowel sounds present  EXTREMITIES:  2+ Peripheral Pulses, No clubbing, cyanosis, or edema  LYMPH: No lymphadenopathy noted  SKIN: No rashes or lesions    Consultant(s) Notes Reviewed:  [x ] YES  [ ] NO  Care Discussed with Consultants/Other Providers [ x] YES  [ ] NO    LABS:                        7.8    12.55 )-----------( 351      ( 07 Jul 2023 06:18 )             24.9       07-07    139  |  100  |  17  ----------------------------<  95  3.8   |  25  |  4.55<H>    Ca    9.0      07 Jul 2023 06:18  Phos  3.5     07-07  Mg     2.10     07-07         Urinalysis Basic - ( 07 Jul 2023 06:18 )    Color: x / Appearance: x / SG: x / pH: x  Gluc: 95 mg/dL / Ketone: x  / Bili: x / Urobili: x   Blood: x / Protein: x / Nitrite: x   Leuk Esterase: x / RBC: x / WBC x   Sq Epi: x / Non Sq Epi: x / Bacteria: x    RADIOLOGY & ADDITIONAL TESTS:    Imaging Personally Reviewed:  [X] YES  [ ] NO    MEDICATIONS  (STANDING):  amLODIPine   Tablet 5 milliGRAM(s) Oral daily  atorvastatin 10 milliGRAM(s) Oral at bedtime  calcium carbonate 1250 mG  + Vitamin D (OsCal 500 + D) 1 Tablet(s) Oral daily  cefTRIAXone   IVPB 1000 milliGRAM(s) IV Intermittent every 24 hours  chlorhexidine 2% Cloths 1 Application(s) Topical daily  cyanocobalamin 1000 MICROGram(s) Oral daily  heparin   Injectable 5000 Unit(s) SubCutaneous every 12 hours  pantoprazole    Tablet 40 milliGRAM(s) Oral every 12 hours  polyethylene glycol 3350 17 Gram(s) Oral daily  simethicone 80 milliGRAM(s) Chew daily    MEDICATIONS  (PRN):  acetaminophen     Tablet .. 650 milliGRAM(s) Oral every 6 hours PRN Temp greater or equal to 38C (100.4F), Mild Pain (1 - 3), Moderate Pain (4 - 6)  melatonin 3 milliGRAM(s) Oral at bedtime PRN Insomnia  ondansetron Injectable 4 milliGRAM(s) IV Push every 8 hours PRN Nausea and/or Vomiting      HEALTH ISSUES - PROBLEM Dx:  Acute UTI    Renal insufficiency    Hypothyroid    HTN (hypertension)    Medication management    Cough    Prophylactic measure    Dysphagia

## 2023-07-07 NOTE — CONSULT NOTE ADULT - SUBJECTIVE AND OBJECTIVE BOX
CC: Dysphagia    HPI: 84 yo f with h/o htn, hypothyroid,  recently discovered pelvic mass causing obstructive uropathy, and renal failure, now with nephrostomy tube and dialysis t/th/sa. ENT consulted for dysphagia. Patient seen and examined. No acute complaints, reports she feels fine. Admits trouble swallowing, minimal PO intake, drank one ensure for lunch. Denies voice change, fever, chills, chest pain and abdominal pain.     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      History of cholecystectomy      History of appendectomy      H/O: hysterectomy        Allergies    No Known Allergies    Intolerances      MEDICATIONS  (STANDING):  amLODIPine   Tablet 5 milliGRAM(s) Oral daily  atorvastatin 10 milliGRAM(s) Oral at bedtime  calcium carbonate 1250 mG  + Vitamin D (OsCal 500 + D) 1 Tablet(s) Oral daily  cefTRIAXone   IVPB 1000 milliGRAM(s) IV Intermittent every 24 hours  chlorhexidine 2% Cloths 1 Application(s) Topical daily  cyanocobalamin 1000 MICROGram(s) Oral daily  heparin   Injectable 5000 Unit(s) SubCutaneous every 12 hours  lactated ringers 1000 milliLiter(s) (100 mL/Hr) IV Continuous <Continuous>  pantoprazole    Tablet 40 milliGRAM(s) Oral every 12 hours  pantoprazole  Injectable 40 milliGRAM(s) IV Push daily  polyethylene glycol 3350 17 Gram(s) Oral daily  simethicone 80 milliGRAM(s) Chew daily    MEDICATIONS  (PRN):  acetaminophen     Tablet .. 650 milliGRAM(s) Oral every 6 hours PRN Temp greater or equal to 38C (100.4F), Mild Pain (1 - 3), Moderate Pain (4 - 6)  melatonin 3 milliGRAM(s) Oral at bedtime PRN Insomnia  ondansetron Injectable 4 milliGRAM(s) IV Push every 8 hours PRN Nausea and/or Vomiting      Social History: never smoker, retired.     Family history: No pertinent family history in first degree relatives    ROS:   ENT: all negative except as noted in HPI   CV: denies palpitations  Pulm: denies SOB, cough, hemoptysis  GI: denies change in appetite, indigestion, n/v  : denies pertinent urinary symptoms, urgency  Neuro: denies numbness/tingling, loss of sensation  Psych: denies anxiety  MS: denies muscle weakness, instability  Heme: denies easy bruising or bleeding  Endo: denies heat/cold intolerance, excessive sweating  Vascular: denies LE edema    Vital Signs Last 24 Hrs  T(C): 36.7 (07 Jul 2023 12:44), Max: 37.7 (06 Jul 2023 21:30)  T(F): 98.1 (07 Jul 2023 12:44), Max: 99.9 (06 Jul 2023 21:30)  HR: 97 (07 Jul 2023 12:44) (92 - 97)  BP: 151/60 (07 Jul 2023 12:44) (128/50 - 151/60)  BP(mean): --  RR: 17 (07 Jul 2023 12:44) (16 - 18)  SpO2: 94% (07 Jul 2023 12:44) (94% - 95%)    Parameters below as of 07 Jul 2023 12:44  Patient On (Oxygen Delivery Method): room air                              7.8    12.55 )-----------( 351      ( 07 Jul 2023 06:18 )             24.9    07-07    139  |  100  |  17  ----------------------------<  95  3.8   |  25  |  4.55<H>    Ca    9.0      07 Jul 2023 06:18  Phos  3.5     07-07  Mg     2.10     07-07         PHYSICAL EXAM:  Gen: NAD  Skin: No rashes, bruises, or lesions  Head: Normocephalic, Atraumatic  Face: no edema, erythema, or fluctuance. Parotid glands soft without mass  Eyes: no scleral injection  Ears:  No evidence of any fluid drainage. No mastoid tenderness, erythema, or ear bulging  Nose: Nares bilaterally patent, no discharge  Mouth: No stridor, no drooling, no trismus.  Mucosa moist, tongue/uvula midline, oropharynx clear  Neck: Flat, supple, no lymphadenopathy, trachea midline, no masses  Lymphatic: No lymphadenopathy  Resp: breathing easily, no stridor  CV: no peripheral edema/cyanosis  GI: nondistended   Peripheral vascular: no JVD or edema  Neuro: facial nerve intact, no facial droop      Fiberoptic Indirect laryngoscopy:  (Scope #2 used)  Flexible laryngoscopy was performed and revealed the following:    -- Nasopharynx had no mass or exudate.    -- Posterior pharyngeal wall clear, no edema, and no erythema    -- Base of tongue was symmetric and not enlarged, mild thrush noted    -- Vallecula was clear    -- Epiglottis, both aryepiglottic folds and both false vocal folds were normal    -- Arytenoids both without edema and erythema     -- True vocal folds were fully mobile and without lesions.     -- Post cricoid area clear, no edema and no erythema    -- Interarytenoid edema was absent    -- Airway patent

## 2023-07-07 NOTE — PROGRESS NOTE ADULT - PROBLEM SELECTOR PLAN 2
Pt w/ renal failure due to obstrtuctive uropathy iso pelvic mass now s/p nephrostomy and started on HD during last hospitalization on a Tu/Th/Sa schedule  - Nephro consult for HD  - c/t monitor urine output and nephrostomy site  - Monitor lytes Pt p/w rigors and weakness. U/A appears positive but possibly contaminated iso chronic nephrostomy. ID following, continuing with ceftriaxone course.  - Will c/w CTX for now as pt w/o hx of resistant infections in past, 7/5-  - Repeat u/a pending collection  - ID following, recs appreciated  - f/u BCx  - Trend fever/WBC curve Pt reports dysphagia to solids not liquids. Reports sensation of food stuck in lower esophagus/chest w/ regurgitation ongoing intermittently for a few weeks/months. Cine esophagogram unremarkable, cleared for full diet by speech. GI/ENT consulted for workup.  - Obtain regular esophagram.  - GI/ENT consulted, appreciate recs  - Myasthenia eval 7/8 AM  - Per pt request will order liquid diet, prefers strawberry-flavored, iced liquid

## 2023-07-07 NOTE — PROGRESS NOTE ADULT - PROBLEM SELECTOR PLAN 6
Also reporting intermittent dry cough over last 4 weeks; lower chest of abd + BL atelectasis   trial Inc Spir  - Check RVP; possible etiology of rigors/weaknes Pt reportedly had synthroid stopped last week by pcp. Will need more collateral, TSH on 7/5 2.33, T4 1.8. Pt without BM for last 5 days, fatigue. Arranged pcp visit to assess status being off synthroid.   - Monitor for symptoms

## 2023-07-07 NOTE — PROGRESS NOTE ADULT - PROBLEM SELECTOR PLAN 1
Pt p/w rigors and weakness. U/A appears positive but possibly contaminated iso chronic nephrostomy  - Will c/w CTX for now as pt w/o hx of resistant infections in past  - Repeat u/a  - f/u UCx but may not be accurate d/t contaminated sample  - ID following. recs appreciated  - f/u BCx  - Trend fever/WBC curve Pt w/ renal failure due to obstructive uropathy, recently discovered pelvic mass. Had arranged for outpatient oncology prior to this admission.  - Consulted Gyn-onc, appreciate recs  - c/t monitor urine output and nephrostomy site  - Monitor lytes Pt w/ renal failure due to obstructive uropathy, recently discovered pelvic mass. Had arranged for outpatient oncology prior to this admission.  - Consulted Gyn-onc: no acute gyn onc interventions at this time. Outpatient visit with Dr. Pretty rescheduled to 7/17. Care plan to be coordinated with med onc and rad onc, preferably while inpatient in case port placements are needed.  - c/t monitor urine output and nephrostomy site  - Monitor lytes

## 2023-07-07 NOTE — PROGRESS NOTE ADULT - PROBLEM SELECTOR PLAN 7
Impression: Vitreous degeneration, bilateral: H43.813.  Plan: stable will continue to monitor DVT: HSQ  Diet: Liquid pending S&S/esophagram  Dispo: Pending PT eval Pt w/ HTN  - c/w home norvasc w/ hold parameters

## 2023-07-07 NOTE — CONSULT NOTE ADULT - ASSESSMENT
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 ENT consulted for dysphagia. FOE is wnl, patent airway with mild thrush at the base of tongue.

## 2023-07-07 NOTE — PROGRESS NOTE ADULT - PROBLEM SELECTOR PLAN 3
Pt reports dysphagia to solids not liquids. Reports sensation of food stuck in lower esophagus/chest w/ regurgitation ongoing intermittently for a few weeks/months  - Per pt request will order liquid diet  - S&S evaluation   - Esophagram Pt w/ renal failure due to obstructive uropathy iso pelvic mass now s/p nephrostomy and started on HD during last hospitalization on a Tu/Th/Sa schedule. Nephrology consulted,   - Nephro consulted for HD: continue HD, UF as tolerated.  - c/t monitor urine output and nephrostomy site  - Monitor lytes Pt p/w rigors and weakness. U/A appears positive but possibly contaminated iso chronic nephrostomy. ID following, continuing with ceftriaxone course.  - Will c/w CTX for now as pt w/o hx of resistant infections in past, 7/5-  - Repeat u/a pending collection  - ID following, recs appreciated  - f/u BCx  - Trend fever/WBC curve

## 2023-07-07 NOTE — CONSULT NOTE ADULT - ASSESSMENT
83 year old female with history of HTN, hypothyroid, recently admitted for obstructive uropathy and renal failure 2/2 obstructing pelvic mass, now with nephrostomy tube and dialysis t/th/sa, presented with new-onset generalized weakness, and rigors w/ U/a that appears possibly contaminated.     GI was consulted for gagging sensation with eating, and inability to tolerate PO.    #Inability to tolerate PO  #Nausea    - No clear signs of oral or esophageal dysphagia, and inability to tolerate PO is mostly 2/2 gagging. Negative MBS  - (+) thrush, but no odynophagia (denied hx of inhaler use)   - Nausea is well managed with Zofran prn  - ? hx of R neck injury around the same time of symptomatic onset, without voice change and less likely due to recurrent laryngeal nerve damage.   - Extensive oncology hx, but denied known metastatic disease in the throat region    Recommendations  - Please obtain ENT evaluation given the nature of presentation  - Obtain esophogram  - PPI daily and Zofran prn for symptomatic support  - If no clear findings on ENT eval and esophogram, may consider EGD next week.     Note incomplete until finalized by attending signature/attestation.    Monica Desir  GI/Hepatology Fellow    MONDAY-FRIDAY 8AM-5PM:  Pager# [phone] ([facility]) or [phone] ([facility])    NON-URGENT CONSULTS:  Please email [email or web address]  AT NIGHT AND ON WEEKENDS:  Contact on-call GI fellow via answering service ([phone]) from 5pm-8am and on weekends/holidays

## 2023-07-07 NOTE — PROGRESS NOTE ADULT - ATTENDING COMMENTS
Pt seen and examined 7/6/23    Daughter @ bedside  Pt reports feeling suddenly weak in the middle of the afternoon yesterday, but no associated HA, fevers, chills, rigors, sweats, SOB, CP, dizziness, palpitations, N/V or abd pain.  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    Esophagram pending for gagging sensation during esophageal phase of food passage; no issues with pills or liquids  LE venous doppler also opending    Reordered U/A and urine culture via straight cath since 1st two U/As are surface contaminated    House renal consulted for resumption of HD inpt    Check TSH    Prophylactic DVT prophylaxis
Pt seen and examined 7/5/23    She reports intermittent gagging sensation with nausea with mostly solids.  No vomiting, no odynophagia.  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    Unclear that there is a new infectious source.  U/A is unreliable due to the collection from the nephrostomy.  Monitor blood cultures.  Cont ceftriaxone for now.  Would get speech/swallow to check for oropharyngeal dysphagia; if she passes the screen, then would get esophagram.  Gyn/onc consult since she was due to see him @ clinic on 7/7/23 for delineation of treatment plan going forward.    House renal consult for resumption of HD inpt    Check TSH    Prophylactic DVT prophylaxis
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    I have gone ahead and emailed house GI, to inquire whether or not the pt's gagging symptom could be atypical sign of GERD vs motility disorder    The urine culture from 7/6/23 is normal, will touch base with ID regarding shortened duration for ceftriaxone administration    Prophylactic DVT prophylaxis .

## 2023-07-07 NOTE — PROGRESS NOTE ADULT - PROBLEM SELECTOR PLAN 5
Pt w/ HTN  - c/w home norvasc w/ hold parameters Pt found to be anemic, Hb 9 on admission. Hb 7/7 7.8.   -Nephrology following, recs - monitor Hb, transfuse >8, hold off EPO given active mass

## 2023-07-07 NOTE — CONSULT NOTE ADULT - SUBJECTIVE AND OBJECTIVE BOX
83y G_P_ presents with     HPI:  84 yo f with h/o htn, hypothyroid,  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 Reports new-onset generalized weakness, and rigors. Denies change to nephrostomy  volume output, or fluid appearance. Renal function  in general has been improving and has begun voiding scant amounts of urine in last week. Denies pain on urination but UA +. 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      History of cholecystectomy      History of appendectomy      H/O: hysterectomy          REVIEW OF SYSTEMS      General:	    Skin/Breast:  	  Ophthalmologic:  	  ENMT:	    Respiratory and Thorax:  	  Cardiovascular:	    Gastrointestinal:	    Genitourinary:	    Musculoskeletal:	    Neurological:	    Psychiatric:	    Hematology/Lymphatics:	    Endocrine:	    Allergic/Immunologic:	    MEDICATIONS  (STANDING):  amLODIPine   Tablet 5 milliGRAM(s) Oral daily  atorvastatin 10 milliGRAM(s) Oral at bedtime  calcium carbonate 1250 mG  + Vitamin D (OsCal 500 + D) 1 Tablet(s) Oral daily  cefTRIAXone   IVPB 1000 milliGRAM(s) IV Intermittent every 24 hours  chlorhexidine 2% Cloths 1 Application(s) Topical daily  cyanocobalamin 1000 MICROGram(s) Oral daily  heparin   Injectable 5000 Unit(s) SubCutaneous every 12 hours  pantoprazole    Tablet 40 milliGRAM(s) Oral every 12 hours  polyethylene glycol 3350 17 Gram(s) Oral daily  simethicone 80 milliGRAM(s) Chew daily    MEDICATIONS  (PRN):  acetaminophen     Tablet .. 650 milliGRAM(s) Oral every 6 hours PRN Temp greater or equal to 38C (100.4F), Mild Pain (1 - 3), Moderate Pain (4 - 6)  melatonin 3 milliGRAM(s) Oral at bedtime PRN Insomnia  ondansetron Injectable 4 milliGRAM(s) IV Push every 8 hours PRN Nausea and/or Vomiting      Allergies    No Known Allergies    Intolerances        SOCIAL HISTORY:    FAMILY HISTORY:  No pertinent family history in first degree relatives        Vital Signs Last 24 Hrs  T(C): 36.6 (2023 05:40), Max: 37.7 (2023 21:30)  T(F): 97.8 (2023 05:40), Max: 99.9 (2023 21:30)  HR: 92 (2023 05:40) (92 - 101)  BP: 149/65 (2023 05:40) (128/50 - 168/76)  BP(mean): --  RR: 18 (2023 05:40) (16 - 18)  SpO2: 95% (2023 05:40) (95% - 95%)    Parameters below as of 2023 05:40  Patient On (Oxygen Delivery Method): room air        PHYSICAL EXAM:      Constitutional: alert and oriented x 3    Breasts: no tenderness, no nodules    Respiratory: clear    Cardiovascular: regular rate and rhythm    Gastrointestinal: soft, non tender, + bowel sounds. No hepatosplenomegaly, no palpable masses    Genitourinary: NEFG  Cervix: closed/ long, no CMT  Uterus: normal size, non tender  Adnexa: non tender, no palpable masses    Rectal:     Extremities:    Neurological:    Skin:    Lymph Nodes:        LABS:                        7.8    12.55 )-----------( 351      ( 2023 06:18 )             24.9     07-07    139  |  100  |  17  ----------------------------<  95  3.8   |  25  |  4.55<H>    Ca    9.0      2023 06:18  Phos  3.5     07-07  Mg     2.10     07-07        Urinalysis Basic - ( 2023 06:18 )    Color: x / Appearance: x / SG: x / pH: x  Gluc: 95 mg/dL / Ketone: x  / Bili: x / Urobili: x   Blood: x / Protein: x / Nitrite: x   Leuk Esterase: x / RBC: x / WBC x   Sq Epi: x / Non Sq Epi: x / Bacteria: x        RADIOLOGY & ADDITIONAL STUDIES:    < from: CT Abdomen and Pelvis No Cont (23 @ 18:00) >  ACC: [id number] EXAM:  CT ABDOMEN AND PELVIS   ORDERED BY: AUDRA BHAGAT     PROCEDURE DATE:  2023          INTERPRETATION:  CLINICAL INFORMATION: Vomiting, history of pelvic mass.   Evaluate for bowel obstruction    COMPARISON: CT abdomen pelvis 2023, CT chest 2023    CONTRAST/COMPLICATIONS:  IV Contrast: None  Oral Contrast: None  Complications: None reported    PROCEDURE:  CT of the Abdomen and Pelvis was performed.  Sagittal and coronal reformats were performed.    FINDINGS:  LOWER CHEST: Trace bilateral pleural effusions, decreased from prior.   Bibasilar atelectasis. 6 mm right middle lobe peripheral opacity not seen   on prior, may represent intrapulmonary lymph node versus atelectasis.   Left atrial enlargement. Aortic and coronary artery calcifications.    LIVER: Within normal limits.  BILE DUCTS: Normal caliber.  GALLBLADDER: Cholecystectomy.  SPLEEN: Within normal limits.  PANCREAS: Cystic lesion in the pancreatic body measuring 1 cm is again   seen (301-38). No pancreatic duct dilation.  ADRENALS: Within normal limits.  KIDNEYS/URETERS: Status post right nephrostomy tube placement improved   with resolution of right hydroureteronephrosis. Mild left-sided   hydroureteronephrosis to the level of the UVJ is unchanged. Left sided   perinephric edema is again seen. Left renal cyst.    BLADDER: Minimally distended.  REPRODUCTIVE ORGANS: Status post hysterectomy with soft tissue mass   arising from the right aspect of the cervical remnant measuring up to 4.6   cm.    BOWEL: No bowel obstruction. Appendix is not visualized. No evidence of   inflammation in the pericecal region. Colonic diverticulosis.  PERITONEUM: No ascites.  VESSELS: Atherosclerotic changes.  RETROPERITONEUM/LYMPH NODES: Stable left para-aortic and bilateral pelvic   lymphadenopathy, largest measuring 2.5 cm (301-95).).  ABDOMINAL WALL: Postsurgical changes.  BONES: Degenerative changes. Mild anterolisthesis of L4 on L5.    IMPRESSION:  No bowel obstruction.  Resolution of right-sided hydroureteronephrosis status post right   nephrostomy tube placement.  Unchanged mild left hydroureteronephrosis.   Redemonstration of mass emanating from the vaginal cuff with pelvic and   retroperitoneal lymphadenopathy unchanged    --- End of Report---          CURT PEREZ MD; Resident Radiologist  This document has been electronically signed.  REGINA SAWYER MD; Attending Radiologist  This document has been electronically signed. 2023  6:46PM    < end of copied text >  < from: NM PET/CT Onc FDG Skull to Thigh, Inital (23 @ 10:06) >  EXAM: [id number] - PETCT AZAM ONC FDG INIT  - ORDERED BY: GREGG BHAGAT      PROCEDURE DATE:  2023           INTERPRETATION:  CLINICAL INFORMATION: 83-year-old female with squamous   cell carcinoma of the cervix status post total abdominalhysterectomy and   bilateral salpingo-oophorectomy  recently found with new pelvic mass   with biopsy showed squamous cell carcinoma questioned new vaginal versus   recurrent cervical cancer.    TREATMENT STRATEGY EVALUATION: Initial  FASTING BLOOD SUGAR: 97 mg/dL  RADIOPHARMACEUTICAL:  15.6 mCi F-18, FDG, I.V.  UPTAKE PERIOD: 60 minutes  SCANNER: [employer]  ORAL CONTRAST: Patient drank OMNIPAQUE contrast during the uptake period.    TECHNIQUE: Following intravenous injection of radiopharmaceutical and   above uptake period, PET/CT was obtained from base of skull  to   mid-thigh. CT protocol was optimized for PET attenuation correction and   anatomic localization and was not designed to produce and cannot replace   state-of-the-artdiagnostic CT images with specific imaging protocols for   different body parts and indications. Images were reconstructed and   reviewed in axial, coronal and sagittal views and three-dimensional MIP.    The standardized uptake values (SUV) are normalized to patient body   weight and indicate the highest activity concentration (SUVmax) in a   given site. All image numbers refer to axial image number.    COMPARISON:  No prior FDG-PET/CT    OTHER STUDIES USED FOR CORRELATION: CT abdomen and pelvis2023 and   2023; CT chest 2023    FINDINGS:    HEAD/NECK: Physiologic FDG activity in visualized brain, head, and neck.    THORAX: Mildly FDG avid right axillary 0.8 cm lymph node, SUV 2.5 (image   77). Nonavid left supraclavicular lymph node is decreased in size from CT   chest 2023 measuring 0.9 x 0.6 cm, previously 1.5 x 0.9 cm (image   56). Nonavid enlarged subcarinal lymph node is unchanged in size,   measuring up to 1.5 cm in short axis (image 90). More superiorly within   the subcarinal region, there is a focus of increased FDG uptake without   discrete CT correlate is likely corresponding to subcarinal lymph node   with SUV 3.3 (image 83). Right-sided dialysis catheter with tip in   superior cavoatrial junction.    LUNGS: Linear focus of increased FDG avidity in the left lung base (SUV   3.9 image 99) likely representing reexpansion. Bibasilar atelectasis,   decreased from prior. Nonavid linear atelectasis versus scarring in the   right middle lobe. Previously described 5 mm nodules within the right   upper and right lower lobe are not well evaluated on current PET/CT due   to differences in technique.    PLEURA/PERICARDIUM: No abnormal FDG activity. Trace bilateral pleural   effusions, decreased since 2023. New small non-FDG avid pericardial   effusion.    HEPATOBILIARY/PANCREAS: Physiologic FDG activity.  For reference, normal   liver demonstrates SUV mean 3.5. Cholecystectomy. A 1 cm cystic lesion in   the pancreas is photopenic (image 135).    SPLEEN: Physiologic FDG activity. Normal in size.    ADRENAL GLANDS: No abnormal FDG activity. No nodule.    KIDNEYS/URINARY BLADDER: Physiologic excreted FDG activity. Interval   placement of right-sided nephrostomy with pigtail in the renal pelvis. No   hydronephrosis. Unchanged mild left hydronephrosis.    REPRODUCTIVE ORGANS: Hysterectomy. Unchanged appearance of lobular soft   tissue mass at the level of the vaginal cuff, extending into the vagina,   demonstrating markedly FDG uptake with SUV 18.9(image 207).    ABDOMINOPELVIC LYMPH NODES/RETROPERITONEUM: Multiple FDG avid enlarged   lymph nodes in the retroperitoneum and pelvis, unchanged in size when   compared to CT dated 2023. For reference:  Left common iliac lymph node measuring up to  2.1 x 1.9 cm, SUV 14.4   (image 176).  Right external iliac lymph node measuring up to 2.9 x 2.4 cm, SUV 15.1   (image 202).  Left obturator conglomerate of lymph nodes measuring up to 2.7 x 1.3 cm,   SUV 9.6 (image 197).    ESOPHAGUS/STOMACH/BOWEL/PERITONEUM/MESENTERY: No abnormal FDG activity.    VESSELS: Atherosclerotic changes. No aneurysm.    BONES/SOFT TISSUES: Minimally avid arthritic type changes at the right   greater than left glenohumeral joints and right greater than left hips.   Degenerative changes of the spine. Small fat-containing umbilical hernia.    IMPRESSION: Abnormal skull-to-thigh FDG-PET/CT scan.  1. FDG avid lobular soft tissue mass at the level of the vaginal cuff,   extending into the vagina, is unchanged in size when compared to recent   CT abdomen and pelvis and is consistent with patient's biopsy-proven   squamous cell carcinoma.  2. FDG avid retroperitoneal and pelvic lymph nodes consistent with   metastatic disease.  3. Mildly FDG avid right axillary and subcarinal lymph nodes are   nonspecific.  4. New small non-FDG avid pericardial effusion.  5. Interval placement of right-sided nephrostomy tube with resolved   hydronephrosis. Unchanged left mild hydronephrosis.    --- End of Report ---             CHANDA JORDAN MD; Resident Radiologist  This document has been electronically signed.  NINI MCMILLAN MD; Attending Nuclear Medicine   This document has been electronically signed. 2023 12:05PM    < end of copied text >   HPI:  84y/o  w/ h/o HTN, HLD, hypothyroidism & HSIL s/p MILVIA/BSO () with recently discovered pelvic mass concerning for recurrence of disease presenting with obstructive uropathy, and renal failure, now with nephrostomy tube and dialysis t//. Patient reports new-onset generalized weakness, and rigors over the last few days prior to admission. Pt also complains of persistent nausea & SOB with activity over the past week. Denies change to nephrostomy  volume output, or fluid appearance. Renal function  in general has been improving and has begun voiding scant amounts of urine in last week. Denies pain on urination. Pt denies recent fevers, chills, CP, weight loss, abdominal pain, back pain, dysuria, vaginal discharge. Gyn oncology team consulted for abnormal pelvic mass & lymphadenopathy noted on CTAP and outpatient PET CT.     Pt has not followed up with a gynecologist in >10 years. Denies any other significant episodes of vaginal bleeding. Last mammogram >10yrs ago. Last colonoscopy was in 2008 with finding of benign polyps.    On presentation to ED, pt w/ hypertension. Labs significant for Cr 16.77, Lipase 870, BNP 2386. FOBT positive.    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 Denies fibroids, cysts, endometriosis, STI's prior to TAHBSO.  PMH: HTN, HLD, hypothyroidism (Synthroid D/c'd last week by her PCP)  PSH: TAHBSO (), lsc cholecystectomy, Lsc appendectomy  Meds: Diovan, Pravastatin, Aleve, ASA   Allx: NKDA  Social History:  Denies smoking use, drug use, alcohol use.    Vital Signs Last 24 Hrs  T(C): 36.6 (2023 05:40), Max: 37.7 (2023 21:30)  T(F): 97.8 (2023 05:40), Max: 99.9 (2023 21:30)  HR: 92 (2023 05:40) (92 - 101)  BP: 149/65 (2023 05:40) (128/50 - 168/76)  RR: 18 (2023 05:40) (16 - 18)  SpO2: 95% (2023 05:40) (95% - 95%)    Parameters below as of 2023 05:40  Patient On (Oxygen Delivery Method): room air      MEDICATIONS  (STANDING):  amLODIPine   Tablet 5 milliGRAM(s) Oral daily  atorvastatin 10 milliGRAM(s) Oral at bedtime  calcium carbonate 1250 mG  + Vitamin D (OsCal 500 + D) 1 Tablet(s) Oral daily  cefTRIAXone   IVPB 1000 milliGRAM(s) IV Intermittent every 24 hours  chlorhexidine 2% Cloths 1 Application(s) Topical daily  cyanocobalamin 1000 MICROGram(s) Oral daily  heparin   Injectable 5000 Unit(s) SubCutaneous every 12 hours  pantoprazole    Tablet 40 milliGRAM(s) Oral every 12 hours  polyethylene glycol 3350 17 Gram(s) Oral daily  simethicone 80 milliGRAM(s) Chew daily    MEDICATIONS  (PRN):  acetaminophen     Tablet .. 650 milliGRAM(s) Oral every 6 hours PRN Temp greater or equal to 38C (100.4F), Mild Pain (1 - 3), Moderate Pain (4 - 6)  melatonin 3 milliGRAM(s) Oral at bedtime PRN Insomnia  ondansetron Injectable 4 milliGRAM(s) IV Push every 8 hours PRN Nausea and/or Vomiting        PHYSICAL EXAM:      Constitutional: alert and oriented x 3    Respiratory: clear to auscultation bilaterally    Cardiovascular: regular rate and rhythm    Gastrointestinal: soft, non tender, + bowel sounds. No hepatosplenomegaly, no palpable masses    Genitourinary: SVE/SSE deferred though no bleeding noted on pad. subcentimeter raised lesions consistent with prior site of vulvar disease.       Extremities: non erythrematous, no edema or calf pain noted bilaterally.     Skin: no noted areas of desquamation or erythema noted.         LABS:                        7.8    12.55 )-----------( 351      ( 2023 06:18 )             24.9     07-07    139  |  100  |  17  ----------------------------<  95  3.8   |  25  |  4.55<H>    Ca    9.0      2023 06:18  Phos  3.5     07-  Mg     2.10     07-07        Urinalysis Basic - ( 2023 06:18 )    Color: x / Appearance: x / SG: x / pH: x  Gluc: 95 mg/dL / Ketone: x  / Bili: x / Urobili: x   Blood: x / Protein: x / Nitrite: x   Leuk Esterase: x / RBC: x / WBC x   Sq Epi: x / Non Sq Epi: x / Bacteria: x        RADIOLOGY & ADDITIONAL STUDIES:    < from: CT Abdomen and Pelvis No Cont (23 @ 18:00) >  ACC: [id number] EXAM:  CT ABDOMEN AND PELVIS   ORDERED BY: AUDRA BHAGAT     PROCEDURE DATE:  2023          INTERPRETATION:  CLINICAL INFORMATION: Vomiting, history of pelvic mass.   Evaluate for bowel obstruction    COMPARISON: CT abdomen pelvis 2023, CT chest 2023    CONTRAST/COMPLICATIONS:  IV Contrast: None  Oral Contrast: None  Complications: None reported    PROCEDURE:  CT of the Abdomen and Pelvis was performed.  Sagittal and coronal reformats were performed.    FINDINGS:  LOWER CHEST: Trace bilateral pleural effusions, decreased from prior.   Bibasilar atelectasis. 6 mm right middle lobe peripheral opacity not seen   on prior, may represent intrapulmonary lymph node versus atelectasis.   Left atrial enlargement. Aortic and coronary artery calcifications.    LIVER: Within normal limits.  BILE DUCTS: Normal caliber.  GALLBLADDER: Cholecystectomy.  SPLEEN: Within normal limits.  PANCREAS: Cystic lesion in the pancreatic body measuring 1 cm is again   seen (301-38). No pancreatic duct dilation.  ADRENALS: Within normal limits.  KIDNEYS/URETERS: Status post right nephrostomy tube placement improved   with resolution of right hydroureteronephrosis. Mild left-sided   hydroureteronephrosis to the level of the UVJ is unchanged. Left sided   perinephric edema is again seen. Left renal cyst.    BLADDER: Minimally distended.  REPRODUCTIVE ORGANS: Status post hysterectomy with soft tissue mass   arising from the right aspect of the cervical remnant measuring up to 4.6   cm.    BOWEL: No bowel obstruction. Appendix is not visualized. No evidence of   inflammation in the pericecal region. Colonic diverticulosis.  PERITONEUM: No ascites.  VESSELS: Atherosclerotic changes.  RETROPERITONEUM/LYMPH NODES: Stable left para-aortic and bilateral pelvic   lymphadenopathy, largest measuring 2.5 cm (301-95).).  ABDOMINAL WALL: Postsurgical changes.  BONES: Degenerative changes. Mild anterolisthesis of L4 on L5.    IMPRESSION:  No bowel obstruction.  Resolution of right-sided hydroureteronephrosis status post right   nephrostomy tube placement.  Unchanged mild left hydroureteronephrosis.   Redemonstration of mass emanating from the vaginal cuff with pelvic and   retroperitoneal lymphadenopathy unchanged    --- End of Report---          CURT PEREZ MD; Resident Radiologist  This document has been electronically signed.  REGINA SAWYER MD; Attending Radiologist  This document has been electronically signed. 2023  6:46PM    < end of copied text >    < from: NM PET/CT Onc FDG Skull to Thigh, Inital (23 @ 10:06) >  EXAM: [id number] - PETCT SKUL-THI ONC FDG INIT  - ORDERED BY: GREGG BHAGAT      PROCEDURE DATE:  2023           INTERPRETATION:  CLINICAL INFORMATION: 83-year-old female with squamous   cell carcinoma of the cervix status post total abdominal hysterectomy and   bilateral salpingo-oophorectomy 2008 recently found with new pelvic mass   with biopsy showed squamous cell carcinoma questioned new vaginal versus   recurrent cervical cancer.    TREATMENT STRATEGY EVALUATION: Initial  FASTING BLOOD SUGAR: 97 mg/dL  RADIOPHARMACEUTICAL:  15.6 mCi F-18, FDG, I.V.  UPTAKE PERIOD: 60 minutes  SCANNER: [employer]  ORAL CONTRAST: Patient drank OMNIPAQUE contrast during the uptake period.    TECHNIQUE: Following intravenous injection of radiopharmaceutical and   above uptake period, PET/CT was obtained from base of skull  to   mid-thigh. CT protocol was optimized for PET attenuation correction and   anatomic localization and was not designed to produce and cannot replace   state-of-the-art diagnostic CT images with specific imaging protocols for   different body parts and indications. Images were reconstructed and   reviewed in axial, coronal and sagittal views and three-dimensional MIP.    The standardized uptake values (SUV) are normalized to patient body   weight and indicate the highest activity concentration (SUVmax) in a   given site. All image numbers refer to axial image number.    COMPARISON:  No prior FDG-PET/CT    OTHER STUDIES USED FOR CORRELATION: CT abdomen and pelvis2023 and   2023; CT chest 2023    FINDINGS:    HEAD/NECK: Physiologic FDG activity in visualized brain, head, and neck.    THORAX: Mildly FDG avid right axillary 0.8 cm lymph node, SUV 2.5 (image   77). Nonavid left supraclavicular lymph node is decreased in size from CT   chest 2023 measuring 0.9 x 0.6 cm, previously 1.5 x 0.9 cm (image   56). Nonavid enlarged subcarinal lymph node is unchanged in size,   measuring up to 1.5 cm in short axis (image 90). More superiorly within   the subcarinal region, there is a focus of increased FDG uptake without   discrete CT correlate is likely corresponding to subcarinal lymph node   with SUV 3.3 (image 83). Right-sided dialysis catheter with tip in   superior cavoatrial junction.    LUNGS: Linear focus of increased FDG avidity in the left lung base (SUV   3.9 image 99) likely representing reexpansion. Bibasilar atelectasis,   decreased from prior. Nonavid linear atelectasis versus scarring in the   right middle lobe. Previously described 5 mm nodules within the right   upper and right lower lobe are not well evaluated on current PET/CT due   to differences in technique.    PLEURA/PERICARDIUM: No abnormal FDG activity. Trace bilateral pleural   effusions, decreased since 2023. New small non-FDG avid pericardial   effusion.    HEPATOBILIARY/PANCREAS: Physiologic FDG activity.  For reference, normal   liver demonstrates SUV mean 3.5. Cholecystectomy. A 1 cm cystic lesion in   the pancreas is photopenic (image 135).    SPLEEN: Physiologic FDG activity. Normal in size.    ADRENAL GLANDS: No abnormal FDG activity. No nodule.    KIDNEYS/URINARY BLADDER: Physiologic excreted FDG activity. Interval   placement of right-sided nephrostomy with pigtail in the renal pelvis. No   hydronephrosis. Unchanged mild left hydronephrosis.    REPRODUCTIVE ORGANS: Hysterectomy. Unchanged appearance of lobular soft   tissue mass at the level of the vaginal cuff, extending into the vagina,   demonstrating markedly FDG uptake with SUV 18.9(image 207).    ABDOMINOPELVIC LYMPH NODES/RETROPERITONEUM: Multiple FDG avid enlarged   lymph nodes in the retroperitoneum and pelvis, unchanged in size when   compared to CT dated 2023. For reference:  Left common iliac lymph node measuring up to  2.1 x 1.9 cm, SUV 14.4   (image 176).  Right external iliac lymph node measuring up to 2.9 x 2.4 cm, SUV 15.1   (image 202).  Left obturator conglomerate of lymph nodes measuring up to 2.7 x 1.3 cm,   SUV 9.6 (image 197).    ESOPHAGUS/STOMACH/BOWEL/PERITONEUM/MESENTERY: No abnormal FDG activity.    VESSELS: Atherosclerotic changes. No aneurysm.    BONES/SOFT TISSUES: Minimally avid arthritic type changes at the right   greater than left glenohumeral joints and right greater than left hips.   Degenerative changes of the spine. Small fat-containing umbilical hernia.    IMPRESSION: Abnormal skull-to-thigh FDG-PET/CT scan.  1. FDG avid lobular soft tissue mass at the level of the vaginal cuff,   extending into the vagina, is unchanged in size when compared to recent   CT abdomen and pelvis and is consistent with patient's biopsy-proven   squamous cell carcinoma.  2. FDG avid retroperitoneal and pelvic lymph nodes consistent with   metastatic disease.  3. Mildly FDG avid right axillary and subcarinal lymph nodes are   nonspecific.  4. New small non-FDG avid pericardial effusion.  5. Interval placement of right-sided nephrostomy tube with resolved   hydronephrosis. Unchanged left mild hydronephrosis.    --- End of Report ---             CHANDA JORDAN MD; Resident Radiologist  This document has been electronically signed.  NINI MCMILLAN MD; Attending Nuclear Medicine   This document has been electronically signed. 2023 12:05PM    < end of copied text >

## 2023-07-07 NOTE — PROGRESS NOTE ADULT - PROBLEM SELECTOR PLAN 4
Pt reportedly had synthroid stopped last week by pcp. Will need more collateral  - f/u TSH  - collateral about synthroid Pt reports dysphagia to solids not liquids. Reports sensation of food stuck in lower esophagus/chest w/ regurgitation ongoing intermittently for a few weeks/months. Esophagram unremarkable.  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 Nephrology consulted.  - Give 1000 mL LR 100mL/hr 7/7  - Nephro consulted for HD: continue HD, UF as tolerated.  - c/t monitor urine output and nephrostomy site  - Monitor lytes

## 2023-07-07 NOTE — CONSULT NOTE ADULT - NSCONSULTADDITIONALINFOA_GEN_ALL_CORE
Gyn Onc Fellow Addendum:    Pt seen and examined at bedside. Agree with above.  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 Of note, patient with obstructive uropathy and renal failure, currently with nephrostomy tube and on dialysis. Gyn Onc consulted to assist in coordination of care.     VS reviewed  Labs, imaging reviewed     Results of recent PET-CT 7/2 reviewed with patient and family. Discussed that patient is not a surgical candidate based on extent of disease and vaginal involvement of tumor.   Patient previously seen by Dr. Rahman (Rad Onc) outpatient with consideration of chemo/RT however per addendum by Dr. Rahman after review of PET results, would consider systemic therapy as initial approach if possible given adenopathy above level of renals.  Patient was scheduled to see Dr. Pretty (Gyn Onc) and Dr. Martinez (Med Onc) outpatient today; both appointments have been rescheduled.   Patient's family with many questions regarding initiation and timing of chemotherapy, chemotherapy regimen, possibility of port placement while inpatient, and possibility of coordinating chemotherapy treatment where family is in [place]. Standard of care therapy reviewed however discussed that given patient's comorbidites and recent PET findings, a more specific discussion regarding treatment planning should be had with Med Onc.   Appreciate care per primary team, Gyn Onc remains available as needed.       Susie Garcia MD

## 2023-07-07 NOTE — CONSULT NOTE ADULT - ATTENDING COMMENTS
Patient seen/examined. Family at bedside. Requested to evaluate patient regarding approximate 2 week history of "gagging and difficulty swallowing". Symptom onset temporally related to removal of Shiley catheter from right side of neck. Subsequently after catheter removal had swelling at site requiring antibiotics. Initially reported solid food dysphagia and patient indicates that after 4 bites experienced sense of bolus hangup localize to back of throat and sternal notch region associated with gagging. Symptoms progressed such that even after one bite of food and then the even ingestion of liquids provoked similar symptoms. Describes sense of feeling a mechanical impedance to passage of swallowed contents such that she describes feeling as if "a lid has closed". No odynophagia. Patient somewhat vague as to whether the gagging precedes the sense of dysphagia and bolus hangup versus the other way around. PE/labs as noted. Comfortable/NAD/nontoxic-appearing. No cervical or supraclavicular adenopathy. No residual swelling of neck. "White coat" of tongue noted but no obvious Candida in pharynx.  IMP:dysphagia-gagging. Question as to oral candidiasis but not definite. Evaluate for upper esophageal web, ring, Zenker's or other pathologic process. Possibly oropharyngeal dysphagia that may warrant assessment regarding neurologic or neuromuscular status.  REC   -ENT consult.:  -Barium esophagram with barium tablet.  -Consider appeared treatment for candidiasis with Diflucan and observe symptomatic treatment response.  -Antireflux precautions.  -Pantoprazole 40 mg q.d. 30 minutes a.c.  -Ondansetron p.r.n. for nausea.  -Pending clinical course and response to symptomatically measures may warrant upper endoscopy for further evaluation.
Will coordinate care with medical oncology and radiation oncology  Plan for systemic chemotherapy with medical oncology  No acute gyn oncology intervention at this time

## 2023-07-08 ENCOUNTER — TRANSCRIPTION ENCOUNTER (OUTPATIENT)
Age: 83
End: 2023-07-08

## 2023-07-08 LAB
ANION GAP SERPL CALC-SCNC: 15 MMOL/L — HIGH (ref 7–14)
BUN SERPL-MCNC: 31 MG/DL — HIGH (ref 7–23)
CALCIUM SERPL-MCNC: 9.1 MG/DL — SIGNIFICANT CHANGE UP (ref 8.4–10.5)
CHLORIDE SERPL-SCNC: 98 MMOL/L — SIGNIFICANT CHANGE UP (ref 98–107)
CO2 SERPL-SCNC: 23 MMOL/L — SIGNIFICANT CHANGE UP (ref 22–31)
CREAT SERPL-MCNC: 6.16 MG/DL — HIGH (ref 0.5–1.3)
CULTURE RESULTS: SIGNIFICANT CHANGE UP
EGFR: 6 ML/MIN/1.73M2 — LOW
GLUCOSE SERPL-MCNC: 88 MG/DL — SIGNIFICANT CHANGE UP (ref 70–99)
MAGNESIUM SERPL-MCNC: 2.2 MG/DL — SIGNIFICANT CHANGE UP (ref 1.6–2.6)
PHOSPHATE SERPL-MCNC: 3.7 MG/DL — SIGNIFICANT CHANGE UP (ref 2.5–4.5)
POTASSIUM SERPL-MCNC: 4 MMOL/L — SIGNIFICANT CHANGE UP (ref 3.5–5.3)
POTASSIUM SERPL-SCNC: 4 MMOL/L — SIGNIFICANT CHANGE UP (ref 3.5–5.3)
SODIUM SERPL-SCNC: 136 MMOL/L — SIGNIFICANT CHANGE UP (ref 135–145)
SPECIMEN SOURCE: SIGNIFICANT CHANGE UP

## 2023-07-08 PROCEDURE — 93010 ELECTROCARDIOGRAM REPORT: CPT

## 2023-07-08 RX ORDER — ALTEPLASE 100 MG
2 KIT INTRAVENOUS ONCE
Refills: 0 | Status: COMPLETED | OUTPATIENT
Start: 2023-07-08 | End: 2023-07-08

## 2023-07-08 RX ORDER — HEPARIN SODIUM 5000 [USP'U]/ML
1000 INJECTION INTRAVENOUS; SUBCUTANEOUS ONCE
Refills: 0 | Status: COMPLETED | OUTPATIENT
Start: 2023-07-08 | End: 2023-07-08

## 2023-07-08 RX ORDER — HEPARIN SODIUM 5000 [USP'U]/ML
500 INJECTION INTRAVENOUS; SUBCUTANEOUS
Refills: 0 | Status: COMPLETED | OUTPATIENT
Start: 2023-07-08 | End: 2023-07-08

## 2023-07-08 RX ORDER — METOCLOPRAMIDE HCL 10 MG
2.5 TABLET ORAL EVERY 6 HOURS
Refills: 0 | Status: DISCONTINUED | OUTPATIENT
Start: 2023-07-08 | End: 2023-07-09

## 2023-07-08 RX ADMIN — HEPARIN SODIUM 500 UNIT(S): 5000 INJECTION INTRAVENOUS; SUBCUTANEOUS at 21:48

## 2023-07-08 RX ADMIN — HEPARIN SODIUM 1000 UNIT(S): 5000 INJECTION INTRAVENOUS; SUBCUTANEOUS at 21:48

## 2023-07-08 RX ADMIN — CHLORHEXIDINE GLUCONATE 1 APPLICATION(S): 213 SOLUTION TOPICAL at 12:24

## 2023-07-08 RX ADMIN — Medication 500000 UNIT(S): at 09:15

## 2023-07-08 RX ADMIN — HEPARIN SODIUM 5000 UNIT(S): 5000 INJECTION INTRAVENOUS; SUBCUTANEOUS at 05:56

## 2023-07-08 RX ADMIN — PANTOPRAZOLE SODIUM 40 MILLIGRAM(S): 20 TABLET, DELAYED RELEASE ORAL at 17:39

## 2023-07-08 RX ADMIN — Medication 500000 UNIT(S): at 17:38

## 2023-07-08 RX ADMIN — SIMETHICONE 80 MILLIGRAM(S): 80 TABLET, CHEWABLE ORAL at 12:24

## 2023-07-08 RX ADMIN — POLYETHYLENE GLYCOL 3350 17 GRAM(S): 17 POWDER, FOR SOLUTION ORAL at 12:22

## 2023-07-08 RX ADMIN — HEPARIN SODIUM 500 UNIT(S): 5000 INJECTION INTRAVENOUS; SUBCUTANEOUS at 22:26

## 2023-07-08 RX ADMIN — PREGABALIN 1000 MICROGRAM(S): 225 CAPSULE ORAL at 12:22

## 2023-07-08 RX ADMIN — ALTEPLASE 2 MILLIGRAM(S): KIT at 21:25

## 2023-07-08 RX ADMIN — Medication 500000 UNIT(S): at 14:35

## 2023-07-08 RX ADMIN — Medication 1 TABLET(S): at 12:23

## 2023-07-08 RX ADMIN — PANTOPRAZOLE SODIUM 40 MILLIGRAM(S): 20 TABLET, DELAYED RELEASE ORAL at 05:56

## 2023-07-08 NOTE — PROGRESS NOTE ADULT - SUBJECTIVE AND OBJECTIVE BOX
Vin Church, PGY1    BATSHEVA HOBBS  83y  Female    Complaints/Subjective: Pt reports no nausea over last 24 hours, feels better. Improving po intake, best in last 5 days. Has not had BM in ~6 days but feels like her bowels have been moving with more po intake. Pt denies subj fevers/chills, suprapubic pain, HA, dizziness, chest pain, palpitations, diarrhea.    FAMILY HISTORY:  No pertinent family history in first degree relatives    Vital Signs Last 24 Hrs  T(C): 36.7 (08 Jul 2023 05:38), Max: 36.9 (07 Jul 2023 20:47)  T(F): 98.1 (08 Jul 2023 05:38), Max: 98.4 (07 Jul 2023 20:47)  HR: 86 (08 Jul 2023 05:38) (86 - 97)  BP: 137/59 (08 Jul 2023 05:38) (134/60 - 151/60)  BP(mean): --  RR: 16 (08 Jul 2023 05:38) (16 - 17)  SpO2: 95% (08 Jul 2023 05:38) (94% - 96%)    Parameters below as of 08 Jul 2023 05:38  Patient On (Oxygen Delivery Method): room air        Parameters below as of 07 Jul 2023 05:40  Patient On (Oxygen Delivery Method): room air    PHYSICAL EXAM:  GENERAL: NAD, well-groomed, well-developed  HEENT:  Atraumatic, Normocephalic, +oral thrush, neck supple, no JVD, normal thyroid  NERVOUS SYSTEM:  Alert & Oriented X3, Good concentration; Motor Strength 5/5 B/L upper and lower extremities; DTRs 2+ intact and symmetric, 1+ edema bilaterally ankles  CHEST/LUNG: Clear to percussion bilaterally; No rales, rhonchi, wheezing, or rubs  HEART: Regular rate and rhythm; No murmurs, rubs, or gallops  ABDOMEN: Soft, Nontender, Nondistended; Bowel sounds present  EXTREMITIES:  2+ Peripheral Pulses, No clubbing, cyanosis, or edema  LYMPH: No lymphadenopathy noted  SKIN: No rashes or lesions    Consultant(s) Notes Reviewed:  [x ] YES  [ ] NO  Care Discussed with Consultants/Other Providers [ x] YES  [ ] NO    LABS:                        7.8    12.55 )-----------( 351      ( 07 Jul 2023 06:18 )             24.9       07-08    136  |  98  |  31<H>  ----------------------------<  88  4.0   |  23  |  6.16<H>    Ca    9.1      08 Jul 2023 05:50  Phos  3.7     07-08  Mg     2.20     07-08         Urinalysis Basic - ( 08 Jul 2023 05:50 )    Color: x / Appearance: x / SG: x / pH: x  Gluc: 88 mg/dL / Ketone: x  / Bili: x / Urobili: x   Blood: x / Protein: x / Nitrite: x   Leuk Esterase: x / RBC: x / WBC x   Sq Epi: x / Non Sq Epi: x / Bacteria: x    RADIOLOGY & ADDITIONAL TESTS:    Imaging Personally Reviewed:  [X] YES  [ ] NO    MEDICATIONS  (STANDING):  amLODIPine   Tablet 5 milliGRAM(s) Oral daily  atorvastatin 10 milliGRAM(s) Oral at bedtime  calcium carbonate 1250 mG  + Vitamin D (OsCal 500 + D) 1 Tablet(s) Oral daily  cefTRIAXone   IVPB 1000 milliGRAM(s) IV Intermittent every 24 hours  chlorhexidine 2% Cloths 1 Application(s) Topical daily  cyanocobalamin 1000 MICROGram(s) Oral daily  heparin   Injectable 5000 Unit(s) SubCutaneous every 12 hours  pantoprazole    Tablet 40 milliGRAM(s) Oral every 12 hours  polyethylene glycol 3350 17 Gram(s) Oral daily  simethicone 80 milliGRAM(s) Chew daily    MEDICATIONS  (PRN):  acetaminophen     Tablet .. 650 milliGRAM(s) Oral every 6 hours PRN Temp greater or equal to 38C (100.4F), Mild Pain (1 - 3), Moderate Pain (4 - 6)  melatonin 3 milliGRAM(s) Oral at bedtime PRN Insomnia  ondansetron Injectable 4 milliGRAM(s) IV Push every 8 hours PRN Nausea and/or Vomiting      HEALTH ISSUES - PROBLEM Dx:  Acute UTI    Renal insufficiency    Hypothyroid    HTN (hypertension)    Medication management    Cough    Prophylactic measure    Dysphagia

## 2023-07-08 NOTE — DISCHARGE NOTE PROVIDER - NSDCFUADDAPPT_GEN_ALL_CORE_FT
Follow-up with your outpatient medical oncology team on 7/10.    Appointments to schedule:  -Please follow-up with your primary care physician by 7/17/23.    Appointments to schedule:  -Please follow-up with your primary care physician, Dr. Saha, within 1-2 weeks of discharge.  -Please follow-up with your nephrologist, Dr. Thorpe, within 1-2 weeks    Appointments to schedule:  -Please follow-up with your primary care physician, Dr. Saha, within 1-2 weeks of discharge.  -Please follow-up with your nephrologist, Dr. Thorpe, within 1-2 weeks  -Please follow up with GI clinic for further evaluation of your dysphagia.

## 2023-07-08 NOTE — DISCHARGE NOTE PROVIDER - NSDCMRMEDTOKEN_GEN_ALL_CORE_FT
amLODIPine 5 mg oral tablet: 1 tab(s) orally once a day  Caltrate 600 + D oral tablet: 1 tab(s) orally once a day  cyanocobalamin 1000 mcg oral tablet: 1 tab(s) orally once a day  pantoprazole 40 mg oral delayed release tablet: 1 tab(s) orally 2 times a day  pantoprazole 40 mg oral delayed release tablet: 1 tab(s) orally 2 times a day Please take 1 pill twice a day until 7/7. Your last dose will be on 7/7  pravastatin 20 mg oral tablet: 1 tab(s) orally once a day   amLODIPine 5 mg oral tablet: 1 tab(s) orally once a day  Caltrate 600 + D oral tablet: 1 tab(s) orally once a day  cyanocobalamin 1000 mcg oral tablet: 1 tab(s) orally once a day  nystatin 100,000 units/mL oral suspension: 5 milliliter(s) orally 3 times a day Swish and swallow 3x per day for 7 days.  pantoprazole 40 mg oral delayed release tablet: 1 tab(s) orally 2 times a day Please take 1 pill twice a day until 7/7. Your last dose will be on 7/7  pravastatin 20 mg oral tablet: 1 tab(s) orally once a day  senna leaf extract oral tablet: 1 tab(s) orally once

## 2023-07-08 NOTE — DISCHARGE NOTE PROVIDER - NSDCCPCAREPLAN_GEN_ALL_CORE_FT
PRINCIPAL DISCHARGE DIAGNOSIS  Diagnosis: Acute UTI  Assessment and Plan of Treatment: In the ED, you were found to have an urinary tract infection (UTI). The UTI was treated with a course of IV antibiotics without further symptoms.     PRINCIPAL DISCHARGE DIAGNOSIS  Diagnosis: Acute UTI  Assessment and Plan of Treatment: In the ED, you were found to have an urinary tract infection (UTI). Given the recently discovered pelvic mass and your history of starting hemodialysis recently, we ordered a CT scan to further investigate for structural risk factors. The image showed mild dilation of parts of your urinary system. The UTI was treated empirically with a course of IV antibiotics without further symptoms.     PRINCIPAL DISCHARGE DIAGNOSIS  Diagnosis: Acute UTI  Assessment and Plan of Treatment: In the ED, you were found to have an urinary tract infection (UTI). Given the recently discovered pelvic mass and your history of starting hemodialysis recently, we ordered a CT scan to further investigate for structural risk factors. The image showed mild dilation of parts of your urinary system. The UTI was treated empirically with a course of IV antibiotics without further symptoms.      SECONDARY DISCHARGE DIAGNOSES  Diagnosis: Dysphagia  Assessment and Plan of Treatment: On admission, you were found to have trouble swallowing with very low food intake. An swallow test using x-ray did not show any abnormalities. The ENT team performed a laryngoscopy and did not find any abnormalities. Your swallowing and appetite improved over the course of your admission. We have not yet identified a clear cause of the trouble swallowing.     PRINCIPAL DISCHARGE DIAGNOSIS  Diagnosis: Generalized weakness  Assessment and Plan of Treatment: In the ED, you were found to exhibit generalized weakness likely due to poor food intake over an extended period of time. Over the course of your admission, we wanted to rule out infectious or other structural causes of your weakness. In the ED, you were found to have a urinary tract infection. Given the recently discovered pelvic mass and your history of starting hemodialysis recently, we ordered a CT scan to further investigate for structural risk factors. The image showed mild dilation of parts of your urinary system. The UTI was treated empirially with a course of IV antibiotics without further systems. Other tests for infection including bacterial cultures and respiratory virus tests were negative.   If you develop sudden-onset weakness, please return to the ED.      SECONDARY DISCHARGE DIAGNOSES  Diagnosis: Dysphagia  Assessment and Plan of Treatment: On admission, you were found to have trouble swallowing with very low food intake. An swallow test using x-ray did not show any abnormalities. The ENT team performed a laryngoscopy and did not find any abnormalities. An x-ray of your abdomen did not show any obstruction or abnormalities. Your swallowing and appetite improved over the course of your admission. We have not yet identified a clear cause of the trouble swallowing.   If you continue to have trouble swallowing with poor food intake or have shortness of breath, please return to the ED.   If you have acute changes to your vision along with issues swallowing, please return to the ED.     PRINCIPAL DISCHARGE DIAGNOSIS  Diagnosis: Generalized weakness  Assessment and Plan of Treatment: In the ED, you were found to exhibit generalized weakness likely due to poor food intake over an extended period of time. Over the course of your admission, we wanted to rule out infectious or other structural causes of your weakness. In the ED, you were found to have a urinary tract infection. Given the recently discovered pelvic mass and your history of starting hemodialysis recently, we ordered a CT scan to further investigate for structural risk factors. The image showed mild dilation of parts of your urinary system, but is stable from previous imaging.. The UTI was treated empirially with a course of IV antibiotics but there was low suspicion that you actually had a urinary infection because the urine sample data looked like it was contaminated. Other tests for infection including bacterial cultures and respiratory virus tests were negative.   If you develop sudden-onset weakness, please return to the ED.      SECONDARY DISCHARGE DIAGNOSES  Diagnosis: Dysphagia  Assessment and Plan of Treatment: On admission, you were found to have trouble swallowing with very low food intake. An swallow test using x-ray did not show any abnormalities. The ENT team performed a laryngoscopy and did not find any abnormalities. An x-ray of your abdomen did not show any obstruction or abnormalities, just air without any dilation of your bowel. Your swallowing and appetite improved over the course of your admission. You were found to have thrush in your mouth, which may have contributed to your difficulty swallowing. You were given a medication mouth wash to rinse and swallow. Please continue to take the wash 3 times per day for the course of 1 week. Although this may have contributed to your swallowing difficulty We have not yet identified a clear cause of the trouble swallowing.    If you continue to have trouble swallowing with poor food intake or have shortness of breath, please return to the ED. If you notice changes in your voice or double vision please seek medical care.   If you have acute changes to your vision along with issues swallowing, please return to the ED.

## 2023-07-08 NOTE — PROGRESS NOTE ADULT - PROBLEM SELECTOR PLAN 6
Pt reportedly had synthroid stopped last week by pcp. Will need more collateral, TSH on 7/5 2.33, T4 1.8. Pt without BM for last 5 days, fatigue. Arranged pcp visit to assess status being off synthroid.   - Monitor for symptoms

## 2023-07-08 NOTE — DISCHARGE NOTE PROVIDER - PROVIDER TOKENS
PROVIDER:[TOKEN:[[id number]],FOLLOWUP:[1 week],ESTABLISHEDPATIENT:[T]],PROVIDER:[TOKEN:[[id number]],FOLLOWUP:[1 week],ESTABLISHEDPATIENT:[T]]

## 2023-07-08 NOTE — PROGRESS NOTE ADULT - PROBLEM SELECTOR PLAN 5
Pt found to be anemic, Hb 9 on admission. Hb 7/7 7.8.   -Nephrology following, recs - monitor Hb, transfuse >8, hold off EPO given active mass

## 2023-07-08 NOTE — PROGRESS NOTE ADULT - ASSESSMENT
82 yo f with h/o htn, hypothyroid,  recently discovered pelvic mass causing obstructive uropathy, and renal failure, now with nephrostomy tube and dialysis t/th/sa. Reports new-onset generalized weakness, and rigors w/ U/a that appears possibly contaminated.     7/7: Pt continues to endorse intermittent sudden-onset nausea, well controlled with zofran, and sudden-onset weakness. Pt had scheduled outpatient oncology and gynecology regarding recently discovered pelvic mass, gyn-onc consulted to see inpatient. Vitals stable, WBC 12K continuing on ceftriaxone pending new UA for UTI. Esophagram negative, pt continues to endorse choking on crackers, yogurt. Unclear etiology of dysphagia, as pt is cleared by speech/swallow, swallow study was normal. ENT/GI consulted.     7/8: Laryngoscopy per ENT unremarkable. Pt improving po intake, no nausea over last 24 hours. Discontinued ceftriaxone. Dysphagia appears to be gradually improving. Will trial solid food intake tomorrow and possible dc.

## 2023-07-08 NOTE — DISCHARGE NOTE PROVIDER - NSFOLLOWUPCLINICS_GEN_ALL_ED_FT
[facility]  Gastroenterology  [address] [place] [address]  Phone: [phone]  Fax:   Follow Up Time: 2 weeks

## 2023-07-08 NOTE — DISCHARGE NOTE PROVIDER - HOSPITAL COURSE
The patient was admitted on 7/4 for new-onset generalized weakness and rigors. Patient had a positive urine analysis and WBC 12K. Infectious Disease was consulted, suggesting continuation of ceftriaxone. Given recently discovered pelvic mass and concern for obstructive uropathy, CT Abdomen/Pelvis was obtained on 7/4 showing mild L hydroureteronephrosis, redemonstration of pelvic mass. Patient endorsed extremely poor oral intake, with sensation of food stuck in lower esophagus/check that has been ongoing intermittently for last several weeks. Patient was evaluated by SLP, showing that patient had adequate swallow but gagged on puree.  GI was consulted for gagging, recommending ENT evaluation. Esophagram was unremarkable. Laryngoscopy performed by ENT on 7/7 showed no structural abnormalities. Pt continued on T/R/S HD schedule during admission. Pt weakness and po intake began to gradually improve during admission. By discharge, pt was able to tolerate solid foods. The patient was admitted on 7/4 for new-onset generalized weakness and rigors. Patient had a positive urine analysis and WBC 12K. Infectious Disease was consulted, suggesting continuation of ceftriaxone. Given recently discovered pelvic mass and concern for obstructive uropathy, CT Abdomen/Pelvis was obtained on 7/4 showing mild L hydroureteronephrosis, redemonstration of pelvic mass. Patient endorsed extremely poor oral intake, with sensation of food stuck in lower esophagus/check that has been ongoing intermittently for last several weeks. Patient was evaluated by SLP, showing that patient had adequate swallow but gagged on puree.  GI was consulted for gagging, recommending ENT evaluation. Esophagram was unremarkable. Laryngoscopy performed by ENT on 7/7 showed no structural abnormalities. Pt continued on T/R/S HD schedule during admission. Pt weakness and po intake began to gradually improve during admission. The patient was admitted on 7/4 for new-onset generalized weakness and rigors. Patient had a positive urine analysis and WBC 12K. Infectious Disease and Nephrology were consulted, suggesting treatment with ceftriaxone. Given recently discovered pelvic mass, history of ESRD and concern for obstructive uropathy, CT Abdomen/Pelvis was obtained on 7/4 showing mild L hydroureteronephrosis, redemonstration of pelvic mass. Patient also endorsed extremely poor oral intake, with sensation of food stuck in lower esophagus/check that has been ongoing intermittently for last several weeks. Patient was evaluated by SLP, showing that patient had adequate swallow but gagged on puree.  GI was consulted for gagging, recommending ENT evaluation. Esophagram was unremarkable. Laryngoscopy performed by ENT on 7/7 showed no structural abnormalities. Pt continued on T/R/S HD schedule during admission. Pt weakness and po intake began to gradually improve during admission. On 7/9, pt was able to better tolerate po foods without weakness or nausea, and was discharged. The patient was admitted on 7/4 for new-onset generalized weakness and rigors. Patient had a positive urine analysis and WBC 12K. No other clear infectious etiology - RVP negative, urine/blood cultures and nasal swab negative. Given recently discovered pelvic mass (outpatient PET on 7/2), history of ESRD and concern for obstructive uropathy, CT Abdomen/Pelvis was obtained on 7/4 showing mild L hydroureteronephrosis, redemonstration of pelvic mass. Infectious Disease and Nephrology were consulted, suggesting treatment with ceftriaxone. Duplex LE negative. Patient also endorsed extremely poor oral intake, with sensation of food stuck in lower esophagus/check that has been ongoing intermittently for last several weeks. Patient was evaluated by SLP, showing that patient had adequate swallow but gagged on puree.  X-ray abdomen no evidence of bowel obstruction. GI was consulted for gagging, recommending ENT evaluation. Esophagram was unremarkable. Laryngoscopy performed by ENT on 7/7 showed no structural abnormalities. Pt continued on T/R/S HD schedule during admission. Pt weakness and po intake began to gradually improve during admission. On 7/9, pt was able to better tolerate po foods without weakness or nausea, and was discharged. The patient was admitted on 7/4 for new-onset generalized weakness and rigors. Patient had a positive urine analysis and WBC 12K. No other clear infectious etiology - RVP negative, urine/blood cultures and nasal swab negative. Given recently discovered pelvic mass (outpatient PET on 7/2), history of ESRD and concern for obstructive uropathy, CT Abdomen/Pelvis was obtained on 7/4 showing mild L hydroureteronephrosis, redemonstration of pelvic mass. Infectious Disease and Nephrology were consulted, suggesting treatment with ceftriaxone. Duplex LE negative for clots. Patient also endorsed extremely poor oral intake, with sensation of food stuck in lower part of mouth/upper espohagus that has been ongoing intermittently for last several weeks. Patient was evaluated by SLP and had a MBS, showing that patient had adequate swallow but gagged on puree.  X-ray abdomen no evidence of bowel obstruction. GI was consulted for gagging, recommending ENT evaluation. Cinesophagram was unremarkable. Laryngoscopy performed by ENT on 7/7 showed no structural abnormalities. Pt continued on T/R/S HD schedule during admission. Pt weakness and po intake began to gradually improve during admission. On 7/9, pt was able to better tolerate po foods without weakness or nausea, and was discharged.

## 2023-07-08 NOTE — DISCHARGE NOTE PROVIDER - NSDCCPTREATMENT_GEN_ALL_CORE_FT
English
PRINCIPAL PROCEDURE  Procedure: CT abdomen pelvis wo/w con  Findings and Treatment: FINDINGS:  LOWER CHEST: Trace bilateral pleural effusions, decreased from prior.   Bibasilar atelectasis. 6 mm right middle lobe peripheral opacity not seen   on prior, may represent intrapulmonary lymph node versus atelectasis.   Left atrial enlargement. Aortic and coronary artery calcifications.  LIVER: Within normal limits.  BILE DUCTS: Normal caliber.  GALLBLADDER: Cholecystectomy.  SPLEEN: Within normal limits.  PANCREAS: Cystic lesion in the pancreatic body measuring 1 cm is again   seen (301-38). No pancreatic duct dilation.  ADRENALS: Within normal limits.  KIDNEYS/URETERS: Status post right nephrostomy tube placement improved   with resolution of right hydroureteronephrosis. Mild left-sided   hydroureteronephrosis to the level of the UVJ is unchanged. Left sided   perinephric edema is again seen. Left renal cyst.  BLADDER: Minimally distended.  REPRODUCTIVE ORGANS: Status post hysterectomy with soft tissue mass   arising from the right aspect of the cervical remnant measuring up to 4.6   cm.  BOWEL: No bowel obstruction. Appendix is not visualized. No evidence of   inflammation in the pericecal region. Colonic diverticulosis.  PERITONEUM: No ascites.  VESSELS: Atherosclerotic changes.  RETROPERITONEUM/LYMPH NODES: Stable left para-aortic and bilateral pelvic   lymphadenopathy, largest measuring 2.5 cm (301-95).).  ABDOMINAL WALL: Postsurgical changes.  BONES: Degenerative changes. Mild anterolisthesis of L4 on L5.  IMPRESSION:  No bowel obstruction.  Resolution of right-sided hydroureteronephrosis status post right   nephrostomy tube placement.  Unchanged mild left hydroureteronephrosis.   Redemonstration of mass emanating from the vaginal cuff with pelvic and   retroperitoneal lymphadenopathy unchanged  --- End of Report---        SECONDARY PROCEDURE  Procedure: US venous duplex scan extremity lower right  Findings and Treatment: INTERPRETATION:  CLINICAL INFORMATION: Lower extremity swelling.  COMPARISON: None available.  TECHNIQUE: Duplex sonography of the BILATERAL LOWER extremity veins with   color and spectral Doppler, with and without compression.  FINDINGS:  RIGHT:  Normal compressibility of the RIGHT common femoral, femoral and popliteal   veins.  Doppler examination shows normal spontaneous and phasic flow.  No RIGHT calf vein thrombosis is detected.  LEFT:  Normal compressibility of the LEFT common femoral, femoral and popliteal   veins.  Doppler examination shows normal spontaneous and phasic flow.  No LEFT calf vein thrombosis is detected.  IMPRESSION:  No evidence of deep venous thrombosis in either lower extremity.      Procedure: Barium swallow  Findings and Treatment: INTERPRETATION:  CLINICAL INFORMATION: Dysphagia.  TECHNIQUE: A cine esophagogram was performed under fluoroscopy in   conjunction with speech pathology.  Time: 0.8 minutes  FINDINGS AND  IMPRESSION:  There is no evidence of penetration or aspiration with puree, solids and   thin fluids.  For further information and recommendations, please refer to the speech   pathologist final report which is available for review in the electronic   medical record.  --- End of Report ---      Procedure: PET scan brain  Findings and Treatment: FINDINGS:  HEAD/NECK: Physiologic FDG activity in visualized brain, head, and neck.  THORAX: Mildly FDG avid right axillary 0.8 cm lymph node, SUV 2.5 (image   77). Nonavid left supraclavicular lymph node is decreased in size from CT   chest 6/7/2023 measuring 0.9 x 0.6 cm, previously 1.5 x 0.9 cm (image   56). Nonavid enlarged subcarinal lymph node is unchanged in size,   measuring up to 1.5 cm in short axis (image 90). More superiorly within   the subcarinal region, there is a focus of increased FDG uptake without   discrete CT correlate is likely corresponding to subcarinal lymph node   with SUV 3.3 (image 83). Right-sided dialysis catheter with tip in   superior cavoatrial junction.  LUNGS: Linear focus of increased FDG avidity in the left lung base (SUV   3.9 image 99) likely representing reexpansion. Bibasilar atelectasis,   decreased from prior. Nonavid linear atelectasis versus scarring in the   right middle lobe. Previously described 5 mm nodules within the right   upper and right lower lobe are not well evaluated on current PET/CT due   to differences in technique.  PLEURA/PERICARDIUM: No abnormal FDG activity. Trace bilateral pleural   effusions, decreased since 6/7/2023. New small non-FDG avid pericardial   effusion.  HEPATOBILIARY/PANCREAS: Physiologic FDG activity.  For reference, normal   liver demonstrates SUV mean 3.5. Cholecystectomy. A 1 cm cystic lesion in   the pancreas is photopenic (image 135).  SPLEEN: Physiologic FDG activity. Normal in size.  ADRENAL GLANDS: No abnormal FDG activity. No nodule.  KIDNEYS/URINARY BLADDER: Physiologic excreted FDG activity. Interval   placement of right-sided nephrostomy with pigtail in the renal pelvis. No   hydronephrosis. Unchanged mild left hydronephrosis.  REPRODUCTIVE ORGANS: Hysterectomy. Unchanged appearance of lobular soft   tissue mass at the level of the vaginal cuff, extending into the vagina,   demonstrating markedly FDG uptake with SUV 18.9(image 207).  Continued below      Procedure: PET scan brain  Findings and Treatment: Continued from above  ABDOMINOPELVIC LYMPH NODES/RETROPERITONEUM: Multiple FDG avid enlarged   lymph nodes in the retroperitoneum and pelvis, unchanged in size when   compared to CT dated 6/6/2023. For reference:  Left common iliac lymph node measuring up to  2.1 x 1.9 cm, SUV 14.4   (image 176).  Right external iliac lymph node measuring up to 2.9 x 2.4 cm, SUV 15.1   (image 202).  Left obturator conglomerate of lymph nodes measuring up to 2.7 x 1.3 cm,   SUV 9.6 (image 197).  ESOPHAGUS/STOMACH/BOWEL/PERITONEUM/MESENTERY: No abnormal FDG activity.  VESSELS: Atherosclerotic changes. No aneurysm.  BONES/SOFT TISSUES: Minimally avid arthritic type changes at the right   greater than left glenohumeral joints and right greater than left hips.   Degenerative changes of the spine. Small fat-containing umbilical hernia.  IMPRESSION: Abnormal skull-to-thigh FDG-PET/CT scan.  1. FDG avid lobular soft tissue mass at the level of the vaginal cuff,   extending into the vagina, is unchanged in size when compared to recent   CT abdomen and pelvis and is consistent with patient's biopsy-proven   squamous cell carcinoma.  2. FDG avid retroperitoneal and pelvic lymph nodes consistent with   metastatic disease.  3. Mildly FDG avid right axillary and subcarinal lymph nodes are   nonspecific.  4. New small non-FDG avid pericardial effusion.  5. Interval placement of right-sided nephrostomy tube with resolved   hydronephrosis. Unchanged left mild hydronephrosis.

## 2023-07-08 NOTE — PROGRESS NOTE ADULT - ASSESSMENT
82 yo f with h/o htn, hypothyroid,  recently discovered pelvic mass causing obstructive uropathy, and renal failure, now with nephrostomy tube and dialysis t/th/sa. Reports new-onset generalized weakness, and rigors admitted to r/o infection    KINJAL vs ESRD on HD TTS  renal failure 2/2 obstructive uropathy 2/2 recently discovered pelvic mass s/p R nephrostomy tube 6/8/23 started on HD  Nephrologist Dr. Thorpe  outpatient unit: [facility]   currently still dialysis dependent   last hd 7/6,   Seen on dialysis tolerating well.   UF as tolerated.   consent in chart    HTN  acceptable  UF as tolerated.   monitor    anemia  monitor  transfuse to keep hb>8  hold of epo for now given active mass    weakness  work up per team

## 2023-07-08 NOTE — PROGRESS NOTE ADULT - PROBLEM SELECTOR PLAN 4
Pt w/ renal failure due to obstructive uropathy iso pelvic mass now s/p nephrostomy and started on HD during last hospitalization on a Tu/Th/Sa schedule. Nephrology consulted.  - Nephro consulted for HD: continue HD, UF as tolerated.  - c/t monitor urine output and nephrostomy site  - Monitor lytes

## 2023-07-08 NOTE — PROGRESS NOTE ADULT - PROBLEM SELECTOR PLAN 2
Pt reports dysphagia to solids not liquids. Reports sensation of food stuck in lower esophagus/chest w/ regurgitation ongoing intermittently for a few weeks/months. Cine esophagogram unremarkable, cleared for full diet by speech. ENT laryngoscopy unremarkable, recommended GI workup with endoscopy. Improving po intake and dysphagia.  - GI/ENT consulted, appreciate recs  - Trial solid-food diet tomorrow, possible dc if can tolerate  - f/u regular esophagram  - Myasthenia eval 7/8 AM

## 2023-07-08 NOTE — PROGRESS NOTE ADULT - SUBJECTIVE AND OBJECTIVE BOX
[facility]   MD NIKITA MARRERO MD KRISTINE SOLTANPOUR, AMARILIS GRAMAJO    TEL:  OFFICE: [phone]  From 5pm-7am Answering Service [phone]    -- RENAL FOLLOW UP NOTE ---Date of Service 07-08-23 @ 20:18    Patient is a 83y old  Female who presents with a chief complaint of uti (08 Jul 2023 17:49)      Patient seen and examined at bedside. No chest pain/sob    VITALS:  T(F): 98.5 (07-08-23 @ 14:03), Max: 98.5 (07-08-23 @ 14:03)  HR: 96 (07-08-23 @ 14:03)  BP: 146/59 (07-08-23 @ 14:03)  RR: 17 (07-08-23 @ 14:03)  SpO2: 95% (07-08-23 @ 14:03)  Wt(kg): --    07-07 @ 07:01  -  07-08 @ 07:00  --------------------------------------------------------  IN: 300 mL / OUT: 250 mL / NET: 50 mL    07-08 @ 07:01  -  07-08 @ 20:18  --------------------------------------------------------  IN: 100 mL / OUT: 300 mL / NET: -200 mL          PHYSICAL EXAM:  General: NAD  Neck: No JVD  Respiratory: CTAB, no wheezes, rales or rhonchi  Cardiovascular: S1, S2, RRR  Gastrointestinal: BS+, soft, NT/ND  Extremities: No peripheral edema    Hospital Medications:   MEDICATIONS  (STANDING):  amLODIPine   Tablet 5 milliGRAM(s) Oral daily  atorvastatin 10 milliGRAM(s) Oral at bedtime  calcium carbonate 1250 mG  + Vitamin D (OsCal 500 + D) 1 Tablet(s) Oral daily  chlorhexidine 2% Cloths 1 Application(s) Topical daily  cyanocobalamin 1000 MICROGram(s) Oral daily  heparin   Injectable 5000 Unit(s) SubCutaneous every 12 hours  heparin   Injectable. 1000 Unit(s) Dialysis. once  heparin   Injectable. 500 Unit(s) Dialysis. every 1 hour  nystatin    Suspension 066025 Unit(s) Oral every 6 hours  pantoprazole    Tablet 40 milliGRAM(s) Oral every 12 hours  polyethylene glycol 3350 17 Gram(s) Oral daily  simethicone 80 milliGRAM(s) Chew daily      LABS:  07-08    136  |  98  |  31<H>  ----------------------------<  88  4.0   |  23  |  6.16<H>    Ca    9.1      08 Jul 2023 05:50  Phos  3.7     07-08  Mg     2.20     07-08      Creatinine Trend: 6.16 <--, 4.55 <--, 6.43 <--, 4.29 <--, 2.69 <--    Phosphorus: 3.7 mg/dL (07-08 @ 05:50)                              7.8    12.55 )-----------( 351      ( 07 Jul 2023 06:18 )             24.9     Urine Studies:  Urinalysis - [07-08-23 @ 05:50]      Color  / Appearance  / SG  / pH       Gluc 88 / Ketone   / Bili  / Urobili        Blood  / Protein  / Leuk Est  / Nitrite       RBC  / WBC  / Hyaline  / Gran  / Sq Epi  / Non Sq Epi  / Bacteria       Iron 32, TIBC 210, %sat 15      [06-07-23 @ 07:04]  Ferritin 151      [06-07-23 @ 07:04]  PTH -- (Ca --)      [06-21-23 @ 05:25]   82  TSH 2.33      [07-06-23 @ 11:57]    HBsAb <3.0      [07-06-23 @ 11:57]  HBsAg Nonreact      [07-06-23 @ 11:57]  HBcAb Nonreact      [07-06-23 @ 11:57]  HCV 0.05, Nonreact      [07-06-23 @ 11:57]      RADIOLOGY & ADDITIONAL STUDIES:

## 2023-07-08 NOTE — DISCHARGE NOTE PROVIDER - CARE PROVIDER_API CALL
Davy Saha  Internal Medicine  [address] [place] [id number]  Phone: [phone]  Fax: [phone]  Established Patient  Follow Up Time: 1 week    Milton Thorpe  Internal Medicine  [address] [place] [address]  Phone: [phone]  Fax: [phone]  Established Patient  Follow Up Time: 1 week

## 2023-07-08 NOTE — PROGRESS NOTE ADULT - PROBLEM SELECTOR PLAN 3
Pt p/w rigors and weakness. U/A appears positive but possibly contaminated iso chronic nephrostomy. ID following, continuing with ceftriaxone course. s/p CTX 7/5-7/8, cultures negative.  - ID following, recs appreciated  - f/u BCx  - Trend fever/WBC curve

## 2023-07-08 NOTE — DISCHARGE NOTE PROVIDER - NSDCFUSCHEDAPPT_GEN_ALL_CORE_FT
[facility]  D[facility]  Practic  Scheduled Appointment: 07/10/2023    [facility]  GYNON[facility]  Scheduled Appointment: 07/17/2023

## 2023-07-09 ENCOUNTER — TRANSCRIPTION ENCOUNTER (OUTPATIENT)
Age: 83
End: 2023-07-09

## 2023-07-09 VITALS
SYSTOLIC BLOOD PRESSURE: 153 MMHG | OXYGEN SATURATION: 95 % | HEART RATE: 98 BPM | RESPIRATION RATE: 17 BRPM | TEMPERATURE: 98 F | DIASTOLIC BLOOD PRESSURE: 64 MMHG

## 2023-07-09 LAB
ANION GAP SERPL CALC-SCNC: 10 MMOL/L — SIGNIFICANT CHANGE UP (ref 7–14)
BUN SERPL-MCNC: 17 MG/DL — SIGNIFICANT CHANGE UP (ref 7–23)
CALCIUM SERPL-MCNC: 8.7 MG/DL — SIGNIFICANT CHANGE UP (ref 8.4–10.5)
CHLORIDE SERPL-SCNC: 102 MMOL/L — SIGNIFICANT CHANGE UP (ref 98–107)
CO2 SERPL-SCNC: 26 MMOL/L — SIGNIFICANT CHANGE UP (ref 22–31)
CREAT SERPL-MCNC: 3.98 MG/DL — HIGH (ref 0.5–1.3)
EGFR: 11 ML/MIN/1.73M2 — LOW
GLUCOSE SERPL-MCNC: 87 MG/DL — SIGNIFICANT CHANGE UP (ref 70–99)
HCT VFR BLD CALC: 24.8 % — LOW (ref 34.5–45)
HGB BLD-MCNC: 7.7 G/DL — LOW (ref 11.5–15.5)
MAGNESIUM SERPL-MCNC: 2.1 MG/DL — SIGNIFICANT CHANGE UP (ref 1.6–2.6)
MCHC RBC-ENTMCNC: 28.8 PG — SIGNIFICANT CHANGE UP (ref 27–34)
MCHC RBC-ENTMCNC: 31 GM/DL — LOW (ref 32–36)
MCV RBC AUTO: 92.9 FL — SIGNIFICANT CHANGE UP (ref 80–100)
MRSA PCR RESULT.: SIGNIFICANT CHANGE UP
NRBC # BLD: 0 /100 WBCS — SIGNIFICANT CHANGE UP (ref 0–0)
NRBC # FLD: 0 K/UL — SIGNIFICANT CHANGE UP (ref 0–0)
PHOSPHATE SERPL-MCNC: 2.4 MG/DL — LOW (ref 2.5–4.5)
PLATELET # BLD AUTO: 352 K/UL — SIGNIFICANT CHANGE UP (ref 150–400)
POTASSIUM SERPL-MCNC: 3.4 MMOL/L — LOW (ref 3.5–5.3)
POTASSIUM SERPL-SCNC: 3.4 MMOL/L — LOW (ref 3.5–5.3)
RBC # BLD: 2.67 M/UL — LOW (ref 3.8–5.2)
RBC # FLD: 13 % — SIGNIFICANT CHANGE UP (ref 10.3–14.5)
S AUREUS DNA NOSE QL NAA+PROBE: SIGNIFICANT CHANGE UP
SODIUM SERPL-SCNC: 138 MMOL/L — SIGNIFICANT CHANGE UP (ref 135–145)
WBC # BLD: 9.56 K/UL — SIGNIFICANT CHANGE UP (ref 3.8–10.5)
WBC # FLD AUTO: 9.56 K/UL — SIGNIFICANT CHANGE UP (ref 3.8–10.5)

## 2023-07-09 RX ORDER — SENNA PLUS 8.6 MG/1
1 TABLET ORAL
Qty: 0 | Refills: 0 | DISCHARGE
Start: 2023-07-09

## 2023-07-09 RX ORDER — SODIUM,POTASSIUM PHOSPHATES 278-250MG
1 POWDER IN PACKET (EA) ORAL ONCE
Refills: 0 | Status: COMPLETED | OUTPATIENT
Start: 2023-07-09 | End: 2023-07-09

## 2023-07-09 RX ORDER — SENNA PLUS 8.6 MG/1
1 TABLET ORAL ONCE
Refills: 0 | Status: COMPLETED | OUTPATIENT
Start: 2023-07-09 | End: 2023-07-09

## 2023-07-09 RX ORDER — POTASSIUM PHOSPHATE, MONOBASIC POTASSIUM PHOSPHATE, DIBASIC 236; 224 MG/ML; MG/ML
30 INJECTION, SOLUTION INTRAVENOUS ONCE
Refills: 0 | Status: DISCONTINUED | OUTPATIENT
Start: 2023-07-09 | End: 2023-07-09

## 2023-07-09 RX ORDER — PANTOPRAZOLE SODIUM 20 MG/1
1 TABLET, DELAYED RELEASE ORAL
Qty: 30 | Refills: 0
Start: 2023-07-09 | End: 2023-07-23

## 2023-07-09 RX ORDER — NYSTATIN 500MM UNIT
5 POWDER (EA) MISCELLANEOUS
Qty: 105 | Refills: 0
Start: 2023-07-09 | End: 2023-07-15

## 2023-07-09 RX ADMIN — CHLORHEXIDINE GLUCONATE 1 APPLICATION(S): 213 SOLUTION TOPICAL at 12:33

## 2023-07-09 RX ADMIN — Medication 500000 UNIT(S): at 17:28

## 2023-07-09 RX ADMIN — Medication 500000 UNIT(S): at 06:46

## 2023-07-09 RX ADMIN — PREGABALIN 1000 MICROGRAM(S): 225 CAPSULE ORAL at 12:29

## 2023-07-09 RX ADMIN — AMLODIPINE BESYLATE 5 MILLIGRAM(S): 2.5 TABLET ORAL at 06:47

## 2023-07-09 RX ADMIN — PANTOPRAZOLE SODIUM 40 MILLIGRAM(S): 20 TABLET, DELAYED RELEASE ORAL at 06:47

## 2023-07-09 RX ADMIN — Medication 1 PACKET(S): at 15:33

## 2023-07-09 RX ADMIN — HEPARIN SODIUM 5000 UNIT(S): 5000 INJECTION INTRAVENOUS; SUBCUTANEOUS at 06:47

## 2023-07-09 RX ADMIN — Medication 500000 UNIT(S): at 12:30

## 2023-07-09 RX ADMIN — SENNA PLUS 1 TABLET(S): 8.6 TABLET ORAL at 14:56

## 2023-07-09 RX ADMIN — SIMETHICONE 80 MILLIGRAM(S): 80 TABLET, CHEWABLE ORAL at 12:29

## 2023-07-09 NOTE — PROGRESS NOTE ADULT - SUBJECTIVE AND OBJECTIVE BOX
Vin Church, PGY1    BATSHEVA HOBBS  83y  Female    Complaints/Subjective: Reports mild back pain related to bowels moving, increasing po intake and on miralax. Pt denies subj fevers/chills, suprapubic pain, HA, dizziness, chest pain, palpitations, diarrhea, nausea.    FAMILY HISTORY:  No pertinent family history in first degree relatives    Vital Signs Last 24 Hrs  T(C): 36.4 (09 Jul 2023 06:40), Max: 37.1 (08 Jul 2023 19:55)  T(F): 97.6 (09 Jul 2023 06:40), Max: 98.8 (08 Jul 2023 19:55)  HR: 95 (09 Jul 2023 06:40) (94 - 98)  BP: 142/65 (09 Jul 2023 06:40) (142/65 - 164/76)  BP(mean): --  RR: 17 (09 Jul 2023 06:40) (16 - 17)  SpO2: 97% (09 Jul 2023 06:40) (94% - 97%)    Parameters below as of 09 Jul 2023 06:40  Patient On (Oxygen Delivery Method): room air    Parameters below as of 07 Jul 2023 05:40  Patient On (Oxygen Delivery Method): room air    PHYSICAL EXAM:  GENERAL: NAD, well-groomed, well-developed  HEENT:  Atraumatic, Normocephalic, +oral thrush, neck supple, no JVD, normal thyroid  NERVOUS SYSTEM:  Alert & Oriented X3, Good concentration; Motor Strength 5/5 B/L upper and lower extremities; DTRs 2+ intact and symmetric, 1+ edema bilaterally ankles  CHEST/LUNG: Clear to percussion bilaterally; No rales, rhonchi, wheezing, or rubs  HEART: Regular rate and rhythm; No murmurs, rubs, or gallops  ABDOMEN: Soft, Nontender, Nondistended; Bowel sounds present  EXTREMITIES:  2+ Peripheral Pulses, No clubbing, cyanosis, or edema  LYMPH: No lymphadenopathy noted  SKIN: No rashes or lesions    Consultant(s) Notes Reviewed:  [x ] YES  [ ] NO  Care Discussed with Consultants/Other Providers [ x] YES  [ ] NO    LABS:                           7.7    9.56  )-----------( 352      ( 09 Jul 2023 05:55 )             24.8       07-09    138  |  102  |  17  ----------------------------<  87  3.4<L>   |  26  |  3.98<H>    Ca    8.7      09 Jul 2023 05:55  Phos  2.4     07-09  Mg     2.10     07-09      Urinalysis Basic - ( 09 Jul 2023 05:55 )    Color: x / Appearance: x / SG: x / pH: x  Gluc: 87 mg/dL / Ketone: x  / Bili: x / Urobili: x   Blood: x / Protein: x / Nitrite: x   Leuk Esterase: x / RBC: x / WBC x   Sq Epi: x / Non Sq Epi: x / Bacteria: x    RADIOLOGY & ADDITIONAL TESTS:    Imaging Personally Reviewed:  [X] YES  [ ] NO    MEDICATIONS  (STANDING):  amLODIPine   Tablet 5 milliGRAM(s) Oral daily  atorvastatin 10 milliGRAM(s) Oral at bedtime  calcium carbonate 1250 mG  + Vitamin D (OsCal 500 + D) 1 Tablet(s) Oral daily  cefTRIAXone   IVPB 1000 milliGRAM(s) IV Intermittent every 24 hours  chlorhexidine 2% Cloths 1 Application(s) Topical daily  cyanocobalamin 1000 MICROGram(s) Oral daily  heparin   Injectable 5000 Unit(s) SubCutaneous every 12 hours  pantoprazole    Tablet 40 milliGRAM(s) Oral every 12 hours  polyethylene glycol 3350 17 Gram(s) Oral daily  simethicone 80 milliGRAM(s) Chew daily    MEDICATIONS  (PRN):  acetaminophen     Tablet .. 650 milliGRAM(s) Oral every 6 hours PRN Temp greater or equal to 38C (100.4F), Mild Pain (1 - 3), Moderate Pain (4 - 6)  melatonin 3 milliGRAM(s) Oral at bedtime PRN Insomnia  ondansetron Injectable 4 milliGRAM(s) IV Push every 8 hours PRN Nausea and/or Vomiting      HEALTH ISSUES - PROBLEM Dx:  Acute UTI    Renal insufficiency    Hypothyroid    HTN (hypertension)    Medication management    Cough    Prophylactic measure    Dysphagia

## 2023-07-09 NOTE — PROGRESS NOTE ADULT - PROBLEM SELECTOR PLAN 2
Pt reports dysphagia to solids not liquids. Reports sensation of food stuck in lower esophagus/chest w/ regurgitation ongoing intermittently for a few weeks/months. Cine esophagogram unremarkable, cleared for full diet by speech. ENT laryngoscopy unremarkable, recommended GI workup with endoscopy. Improving po intake and dysphagia.  - GI/ENT consulted, appreciate recs  - Trial solid-food diet tomorrow, possible dc if can tolerate  - f/u regular esophagram  - Myasthenia eval 7/8 AM Pt reports dysphagia to solids not liquids. Reports sensation of food stuck in lower esophagus/chest w/ regurgitation ongoing intermittently for a few weeks/months. Cine esophagogram unremarkable, cleared for full diet by speech. ENT laryngoscopy unremarkable, recommended GI workup with endoscopy. Improving po intake and dysphagia.  - GI/ENT consulted, appreciate recs  - Trial solid-food diet tomorrow, possible dc if can tolerate  - f/u regular esophagram  - f/u Myasthenia eval 7/8 AM

## 2023-07-09 NOTE — PROGRESS NOTE ADULT - SUBJECTIVE AND OBJECTIVE BOX
[facility]   MD NIKITA MARRERO MD KRISTINE SOLTANPOUR, DO ANGELA WONG, [place]    TEL:  OFFICE: [phone]  From 5pm-7am Answering Service [phone]    -- RENAL FOLLOW UP NOTE ---Date of Service 07-09-23 @ 13:08    Patient is a 83y old  Female who presents with a chief complaint of generalized weakness (09 Jul 2023 11:01)      Patient seen and examined at bedside. No chest pain/sob    VITALS:  T(F): 97.6 (07-09-23 @ 06:40), Max: 98.8 (07-08-23 @ 19:55)  HR: 95 (07-09-23 @ 06:40)  BP: 142/65 (07-09-23 @ 06:40)  RR: 17 (07-09-23 @ 06:40)  SpO2: 97% (07-09-23 @ 06:40)  Wt(kg): --    07-08 @ 07:01  -  07-09 @ 07:00  --------------------------------------------------------  IN: 900 mL / OUT: 2100 mL / NET: -1200 mL          PHYSICAL EXAM:  General: NAD  Neck: No JVD  Respiratory: CTAB, no wheezes, rales or rhonchi  Cardiovascular: S1, S2, RRR  Gastrointestinal: BS+, soft, NT/ND  Extremities: No peripheral edema    Hospital Medications:   MEDICATIONS  (STANDING):  amLODIPine   Tablet 5 milliGRAM(s) Oral daily  atorvastatin 10 milliGRAM(s) Oral at bedtime  calcium carbonate 1250 mG  + Vitamin D (OsCal 500 + D) 1 Tablet(s) Oral daily  chlorhexidine 2% Cloths 1 Application(s) Topical daily  cyanocobalamin 1000 MICROGram(s) Oral daily  heparin   Injectable 5000 Unit(s) SubCutaneous every 12 hours  nystatin    Suspension 324241 Unit(s) Oral every 6 hours  pantoprazole    Tablet 40 milliGRAM(s) Oral every 12 hours  senna 1 Tablet(s) Oral once  simethicone 80 milliGRAM(s) Chew daily      LABS:  07-09    138  |  102  |  17  ----------------------------<  87  3.4<L>   |  26  |  3.98<H>    Ca    8.7      09 Jul 2023 05:55  Phos  2.4     07-09  Mg     2.10     07-09      Creatinine Trend: 3.98 <--, 6.16 <--, 4.55 <--, 6.43 <--, 4.29 <--, 2.69 <--    Phosphorus: 2.4 mg/dL (07-09 @ 05:55)                              7.7    9.56  )-----------( 352      ( 09 Jul 2023 05:55 )             24.8     Urine Studies:  Urinalysis - [07-09-23 @ 05:55]      Color  / Appearance  / SG  / pH       Gluc 87 / Ketone   / Bili  / Urobili        Blood  / Protein  / Leuk Est  / Nitrite       RBC  / WBC  / Hyaline  / Gran  / Sq Epi  / Non Sq Epi  / Bacteria       Iron 32, TIBC 210, %sat 15      [06-07-23 @ 07:04]  Ferritin 151      [06-07-23 @ 07:04]  PTH -- (Ca --)      [06-21-23 @ 05:25]   82  TSH 2.33      [07-06-23 @ 11:57]    HBsAb <3.0      [07-06-23 @ 11:57]  HBsAg Nonreact      [07-06-23 @ 11:57]  HBcAb Nonreact      [07-06-23 @ 11:57]  HCV 0.05, Nonreact      [07-06-23 @ 11:57]      RADIOLOGY & ADDITIONAL STUDIES:

## 2023-07-09 NOTE — DISCHARGE NOTE NURSING/CASE MANAGEMENT/SOCIAL WORK - NSDCPEFALRISK_GEN_ALL_CORE
For information on Fall & Injury Prevention, visit: [email or web address] OR  [email or web address] OR  https://www.cdc.gov/steadi/patient.html

## 2023-07-09 NOTE — DISCHARGE NOTE NURSING/CASE MANAGEMENT/SOCIAL WORK - PATIENT PORTAL LINK FT
You can access the FollowMyHealth Patient Portal offered by [facility] by registering at the following website: [email or web address]. By joining [facility]’s FollowMyHealth portal, you will also be able to view your health information using other applications (apps) compatible with our system.

## 2023-07-09 NOTE — PROGRESS NOTE ADULT - PROBLEM SELECTOR PLAN 9
DVT: HSQ  Diet: Liquid pending S&S/esophagram  Dispo: Pending PT eval

## 2023-07-09 NOTE — PROGRESS NOTE ADULT - PROBLEM SELECTOR PROBLEM 3
Patient seen and examined at bedside.    --Anticoagulation--  enoxaparin Injectable 50 milliGRAM(s) SubCutaneous every 12 hours    T(C): 36.6 (12-03-22 @ 09:10), Max: 36.7 (12-02-22 @ 12:54)  HR: 100 (12-03-22 @ 09:10) (87 - 114)  BP: 167/118 (12-03-22 @ 09:10) (127/97 - 172/88)  RR: 18 (12-03-22 @ 09:10) (18 - 20)  SpO2: 98% (12-03-22 @ 09:10) (96% - 98%)  Wt(kg): --    Exam: AO0-2, EOS; more awake than on admission, L gaze preference, minimal verbal output but responds to questions, L droop, FC w/prompting, BUE spontaneous,  hand (L>R), BLE minimal spontaneous movement.   Acute UTI

## 2023-07-09 NOTE — DISCHARGE NOTE NURSING/CASE MANAGEMENT/SOCIAL WORK - NSDCFUADDAPPT_GEN_ALL_CORE_FT
Appointments to schedule:  -Please follow-up with your primary care physician, Dr. Saha, within 1-2 weeks of discharge.  -Please follow-up with your nephrologist, Dr. Thorpe, within 1-2 weeks  -Please follow up with GI clinic for further evaluation of your dysphagia.

## 2023-07-09 NOTE — PROGRESS NOTE ADULT - PROVIDER SPECIALTY LIST ADULT
Nephrology
Infectious Disease
Infectious Disease
Nephrology
Internal Medicine

## 2023-07-09 NOTE — PROGRESS NOTE ADULT - ASSESSMENT
82 yo f with h/o htn, hypothyroid,  recently discovered pelvic mass causing obstructive uropathy, and renal failure, now with nephrostomy tube and dialysis t/th/sa. Reports new-onset generalized weakness, and rigors w/ U/a that appears possibly contaminated.     7/7: Pt continues to endorse intermittent sudden-onset nausea, well controlled with zofran, and sudden-onset weakness. Pt had scheduled outpatient oncology and gynecology regarding recently discovered pelvic mass, gyn-onc consulted to see inpatient. Vitals stable, WBC 12K continuing on ceftriaxone pending new UA for UTI. Esophagram negative, pt continues to endorse choking on crackers, yogurt. Unclear etiology of dysphagia, as pt is cleared by speech/swallow, swallow study was normal. ENT/GI consulted.     7/8: Laryngoscopy per ENT unremarkable. Pt improving po intake, no nausea over last 24 hours. Discontinued ceftriaxone. Dysphagia appears to be gradually improving. Will trial solid food intake tomorrow and possible dc. 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 Reports new-onset generalized weakness, and rigors w/ U/a that appears possibly contaminated.     7/7: Pt continues to endorse intermittent sudden-onset nausea, well controlled with zofran, and sudden-onset weakness. Pt had scheduled outpatient oncology and gynecology regarding recently discovered pelvic mass, gyn-onc consulted to see inpatient. Vitals stable, WBC 12K continuing on ceftriaxone pending new UA for UTI. Esophagram negative, pt continues to endorse choking on crackers, yogurt. Unclear etiology of dysphagia, as pt is cleared by speech/swallow, swallow study was normal. ENT/GI consulted.     7/8: Laryngoscopy per ENT unremarkable. Pt improving po intake, no nausea over last 24 hours. Discontinued ceftriaxone. Dysphagia appears to be gradually improving. Will trial solid food intake tomorrow and possible dc.    7/9: No acute events overnight. Improving po intake, dc today if she tolerates lunch.

## 2023-07-09 NOTE — PROGRESS NOTE ADULT - PROBLEM SELECTOR PLAN 1
Pt w/ renal failure due to obstructive uropathy, recently discovered pelvic mass. Had arranged for outpatient oncology prior to this admission.  - Outpatient follow-up on 7/10  - Consulted Gyn-onc: no acute gyn onc interventions at this time. Outpatient visit with Dr. Pretty rescheduled to 7/17. Care plan to be coordinated with med onc and rad onc, preferably while inpatient in case port placements are needed.  - c/t monitor urine output and nephrostomy site  - Monitor lytes

## 2023-07-09 NOTE — PROGRESS NOTE ADULT - ASSESSMENT
82 yo f with h/o htn, hypothyroid,  recently discovered pelvic mass causing obstructive uropathy, and renal failure, now with nephrostomy tube and dialysis t/th/sa. Reports new-onset generalized weakness, and rigors admitted to r/o infection    KINJAL vs ESRD on HD TTS  renal failure 2/2 obstructive uropathy 2/2 recently discovered pelvic mass s/p R nephrostomy tube 6/8/23 started on HD  Nephrologist Dr. Thorpe  outpatient unit: [facility]   currently still dialysis dependent   last hd 7/8  UF as tolerated.   consent in chart    HTN  Fluctuating  UF as tolerated.   monitor    anemia  monitor  transfuse to keep hb>8  hold of epo for now given active mass    Hypophosphatemia  Supplement as needed.  Liberalize her diet      Hypokalmemia  Supplement as needed.   liberalize her diet.     weakness  work up per team

## 2023-07-10 ENCOUNTER — RESULT REVIEW (OUTPATIENT)
Age: 83
End: 2023-07-10

## 2023-07-10 ENCOUNTER — OUTPATIENT (OUTPATIENT)
Dept: OUTPATIENT SERVICES | Facility: HOSPITAL | Age: 83
LOS: 1 days | End: 2023-07-10
Payer: MEDICARE

## 2023-07-10 ENCOUNTER — APPOINTMENT (OUTPATIENT)
Dept: HEMATOLOGY ONCOLOGY | Facility: CLINIC | Age: 83
End: 2023-07-10
Payer: MEDICARE

## 2023-07-10 ENCOUNTER — NON-APPOINTMENT (OUTPATIENT)
Age: 83
End: 2023-07-10

## 2023-07-10 VITALS
OXYGEN SATURATION: 97 % | DIASTOLIC BLOOD PRESSURE: 70 MMHG | HEIGHT: 61.42 IN | SYSTOLIC BLOOD PRESSURE: 131 MMHG | HEART RATE: 96 BPM | WEIGHT: 181.99 LBS | BODY MASS INDEX: 33.92 KG/M2 | RESPIRATION RATE: 17 BRPM

## 2023-07-10 DIAGNOSIS — C53.9 MALIGNANT NEOPLASM OF CERVIX UTERI, UNSPECIFIED: ICD-10-CM

## 2023-07-10 DIAGNOSIS — Z90.710 ACQUIRED ABSENCE OF BOTH CERVIX AND UTERUS: Chronic | ICD-10-CM

## 2023-07-10 DIAGNOSIS — Z90.49 ACQUIRED ABSENCE OF OTHER SPECIFIED PARTS OF DIGESTIVE TRACT: Chronic | ICD-10-CM

## 2023-07-10 PROBLEM — N95.0 POSTMENOPAUSAL BLEEDING: Status: ACTIVE | Noted: 2023-07-05

## 2023-07-10 PROBLEM — N13.30 HYDROURETERONEPHROSIS: Status: ACTIVE | Noted: 2023-06-30

## 2023-07-10 LAB
ACRM BINDING ANTIBODY: <0.03 NMOL/L — SIGNIFICANT CHANGE UP (ref 0–0.24)
ALBUMIN SERPL ELPH-MCNC: 3.7 G/DL
ALP BLD-CCNC: 75 U/L
ALT SERPL-CCNC: 13 U/L
ANION GAP SERPL CALC-SCNC: 18 MMOL/L
AST SERPL-CCNC: 32 U/L
BASOPHILS # BLD AUTO: 0.11 K/UL — SIGNIFICANT CHANGE UP (ref 0–0.2)
BASOPHILS NFR BLD AUTO: 0.8 % — SIGNIFICANT CHANGE UP (ref 0–2)
BILIRUB SERPL-MCNC: 0.3 MG/DL
BUN SERPL-MCNC: 27 MG/DL
CALCIUM SERPL-MCNC: 9.4 MG/DL
CANCER AG125 SERPL-ACNC: 42 U/ML
CEA SERPL-MCNC: 4.8 NG/ML
CHLORIDE SERPL-SCNC: 95 MMOL/L
CO2 SERPL-SCNC: 26 MMOL/L
CREAT SERPL-MCNC: 5.92 MG/DL
CULTURE RESULTS: SIGNIFICANT CHANGE UP
CULTURE RESULTS: SIGNIFICANT CHANGE UP
EGFR: 7 ML/MIN/1.73M2
EOSINOPHIL # BLD AUTO: 0.15 K/UL — SIGNIFICANT CHANGE UP (ref 0–0.5)
EOSINOPHIL NFR BLD AUTO: 1.1 % — SIGNIFICANT CHANGE UP (ref 0–6)
GLUCOSE SERPL-MCNC: 95 MG/DL
HAV IGM SER QL: NONREACTIVE
HBV CORE IGG+IGM SER QL: NONREACTIVE
HBV CORE IGM SER QL: NONREACTIVE
HBV SURFACE AB SER QL: NONREACTIVE
HBV SURFACE AG SER QL: NONREACTIVE
HBV SURFACE AG SER QL: NONREACTIVE
HCT VFR BLD CALC: 28.6 % — LOW (ref 34.5–45)
HCV AB SER QL: NONREACTIVE
HCV S/CO RATIO: 0.05 S/CO
HGB BLD-MCNC: 9.3 G/DL — LOW (ref 11.5–15.5)
IMM GRANULOCYTES NFR BLD AUTO: 4.2 % — HIGH (ref 0–0.9)
INR PPP: 1.19 RATIO
LYMPHOCYTES # BLD AUTO: 1.81 K/UL — SIGNIFICANT CHANGE UP (ref 1–3.3)
LYMPHOCYTES # BLD AUTO: 13.3 % — SIGNIFICANT CHANGE UP (ref 13–44)
MAGNESIUM SERPL-MCNC: 2.1 MG/DL
MCHC RBC-ENTMCNC: 29.6 PG — SIGNIFICANT CHANGE UP (ref 27–34)
MCHC RBC-ENTMCNC: 32.5 G/DL — SIGNIFICANT CHANGE UP (ref 32–36)
MCV RBC AUTO: 91.1 FL — SIGNIFICANT CHANGE UP (ref 80–100)
MONOCYTES # BLD AUTO: 1.33 K/UL — HIGH (ref 0–0.9)
MONOCYTES NFR BLD AUTO: 9.7 % — SIGNIFICANT CHANGE UP (ref 2–14)
NEUTROPHILS # BLD AUTO: 9.68 K/UL — HIGH (ref 1.8–7.4)
NEUTROPHILS NFR BLD AUTO: 70.9 % — SIGNIFICANT CHANGE UP (ref 43–77)
NRBC # BLD: 0 /100 WBCS — SIGNIFICANT CHANGE UP (ref 0–0)
PLATELET # BLD AUTO: 456 K/UL — HIGH (ref 150–400)
POTASSIUM SERPL-SCNC: 4.1 MMOL/L
PROT SERPL-MCNC: 6.5 G/DL
PT BLD: 13.8 SEC
RBC # BLD: 3.14 M/UL — LOW (ref 3.8–5.2)
RBC # FLD: 13 % — SIGNIFICANT CHANGE UP (ref 10.3–14.5)
SODIUM SERPL-SCNC: 138 MMOL/L
SPECIMEN SOURCE: SIGNIFICANT CHANGE UP
SPECIMEN SOURCE: SIGNIFICANT CHANGE UP
WBC # BLD: 13.66 K/UL — HIGH (ref 3.8–10.5)
WBC # FLD AUTO: 13.66 K/UL — HIGH (ref 3.8–10.5)

## 2023-07-10 PROCEDURE — 86901 BLOOD TYPING SEROLOGIC RH(D): CPT

## 2023-07-10 PROCEDURE — 86900 BLOOD TYPING SEROLOGIC ABO: CPT

## 2023-07-10 PROCEDURE — 86850 RBC ANTIBODY SCREEN: CPT

## 2023-07-10 PROCEDURE — 99205 OFFICE O/P NEW HI 60 MIN: CPT

## 2023-07-10 NOTE — REASON FOR VISIT
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

## 2023-07-10 NOTE — REVIEW OF SYSTEMS
[Fatigue] : fatigue [Recent Change In Weight] : ~T recent weight change [Negative] : Allergic/Immunologic [Cough] : cough [Fever] : no fever [Chills] : no chills [Night Sweats] : no night sweats [Shortness Of Breath] : no shortness of breath [Wheezing] : no wheezing [SOB on Exertion] : no shortness of breath during exertion [FreeTextEntry2] : decreased appetite [FreeTextEntry6] : dry cough [FreeTextEntry8] : R nephrostomy tube

## 2023-07-10 NOTE — GOALS
[Patient] : patient [Staff: _____] : staff - [unfilled] [Healthcare proxy document is in chart] : Healthcare proxy document is in chart [Time Spent: ___ minutes] : I, personally, spent [unfilled] minutes on advance care planning services with the patient.  This time is separate and distinct from any other care management services provided on this date [FreeTextEntry1] : Veronica Vizcarra [FreeTextEntry2] : dtr [FreeTextEntry3] : [phone] [FreeTextEntry6] : Viviane Villalta [FreeTextEntry5] : dtr [FreeTextEntry4] : [phone] [FreeTextEntry7] : Pt verbalized she is eager to start tx. Plan is for neoadjuvant treatment Carboplatin and Taxol every 3 weeks beginning 7/19/12 for 3 cycles, with interval scans to assess treatment response. Chemotherapy will be followed by palliative pelvic RT.

## 2023-07-10 NOTE — HISTORY OF PRESENT ILLNESS
[de-identified] : 83 year old female with recurrent squamous cell carcinoma vagina/cervical presents today with her daughter for consideration for neoadjuvant chemotherapy. 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 \par on 6/6/23 patient went to [facility] ED for nausea and diarrhea, including with melena, for ~1 week. Found to be in acute renal failure 2/2 an obstructing soft tissue mass inseparable from the cervical remnant on CT scan. Patient is s/p MILVIA, BSO in 2008 when she was initially diagnosed with cervical cancer. Patient was discussed at GYN ONC tumor board on 6/14/23 and recommended chemotherapy with palliative pelvic RT. \par \par Patient was seen by Dr Rahman and PET scan was performed on 7/2/23 revealing FDG-avid soft tissue mass at vaginal cuff extending into the vagina,  FDG avid retroperitoneal and pelvic lymph nodes consistent with metastatic disease.\par \par Had recent admission on 7/4/23 at [facility] for weakness, found to have UTI. Was discharged on 7/9/23 after course of IV abx. R nephrostomy tube in place due to hydronephrosis.\par \par PATH 6/7/23\par 1-Vaginal mass biopsy\par Final Diagnosis\par 1.   Vaginal mass biopsy\par - Superficial fragments of squamous cell carcinoma\par \par CT A/P 7/4/23\par LOWER CHEST: Trace bilateral pleural effusions, decreased from prior. \par Bibasilar atelectasis. 6 mm right middle lobe peripheral opacity not seen \par on prior, may represent intrapulmonary lymph node versus atelectasis. \par Left atrial enlargement. Aortic and coronary artery calcifications.\par LIVER: Within normal limits.\par BILE DUCTS: Normal caliber.\par GALLBLADDER: Cholecystectomy.\par SPLEEN: Within normal limits.\par PANCREAS: Cystic lesion in the pancreatic body measuring 1 cm is again \par seen (301-38). No pancreatic duct dilation.\par ADRENALS: Within normal limits.\par KIDNEYS/URETERS: Status post right nephrostomy tube placement improved \par with resolution of right hydroureteronephrosis. Mild left-sided \par hydroureteronephrosis to the level of the UVJ is unchanged. Left sided \par perinephric edema is again seen. Left renal cyst.\par BLADDER: Minimally distended.\par REPRODUCTIVE ORGANS: Status post hysterectomy with soft tissue mass \par arising from the right aspect of the cervical remnant measuring up to 4.6 \par cm.\par BOWEL: No bowel obstruction. Appendix is not visualized. No evidence of \par inflammation in the pericecal region. Colonic diverticulosis.\par PERITONEUM: No ascites.\par VESSELS: Atherosclerotic changes.\par RETROPERITONEUM/LYMPH NODES: Stable left para-aortic and bilateral pelvic \par lymphadenopathy, largest measuring 2.5 cm (301-95).).\par ABDOMINAL WALL: Postsurgical changes.\par BONES: Degenerative changes. Mild anterolisthesis of L4 on L5.\par IMPRESSION:\par No bowel obstruction.\par Resolution of right-sided hydroureteronephrosis status post right \par nephrostomy tube placement.\par Unchanged mild left hydroureteronephrosis.\par  Redemonstration of mass emanating from the vaginal cuff with pelvic and \par retroperitoneal lymphadenopathy unchanged\par --- End of Report---\par \par \par PET 7/2/23\par HEAD/NECK: Physiologic FDG activity in visualized brain, head, and neck.\par \par THORAX: Mildly FDG avid right axillary 0.8 cm lymph node, SUV 2.5 (image \par 77). Nonavid left supraclavicular lymph node is decreased in size from CT \par chest 6/7/2023 measuring 0.9 x 0.6 cm, previously 1.5 x 0.9 cm (image \par 56). Nonavid enlarged subcarinal lymph node is unchanged in size, \par measuring up to 1.5 cm in short axis (image 90). More superiorly within \par the subcarinal region, there is a focus of increased FDG uptake without \par discrete CT correlate is likely corresponding to subcarinal lymph node \par with SUV 3.3 (image 83). Right-sided dialysis catheter with tip in \par superior cavoatrial junction.\par \par LUNGS: Linear focus of increased FDG avidity in the left lung base (SUV \par 3.9 image 99) likely representing reexpansion. Bibasilar atelectasis, \par decreased from prior. Nonavid linear atelectasis versus scarring in the \par right middle lobe. Previously described 5 mm nodules within the right \par upper and right lower lobe are not well evaluated on current PET/CT due \par to differences in technique.\par \par PLEURA/PERICARDIUM: No abnormal FDG activity. Trace bilateral pleural \par effusions, decreased since 6/7/2023. New small non-FDG avid pericardial \par effusion.\par \par HEPATOBILIARY/PANCREAS: Physiologic FDG activity.  For reference, normal \par liver demonstrates SUV mean 3.5. Cholecystectomy. A 1 cm cystic lesion in \par the pancreas is photopenic (image 135).\par \par SPLEEN: Physiologic FDG activity. Normal in size.\par \par ADRENAL GLANDS: No abnormal FDG activity. No nodule.\par \par KIDNEYS/URINARY BLADDER: Physiologic excreted FDG activity. Interval \par placement of right-sided nephrostomy with pigtail in the renal pelvis. No \par hydronephrosis. Unchanged mild left hydronephrosis.\par \par REPRODUCTIVE ORGANS: Hysterectomy. Unchanged appearance of lobular soft \par tissue mass at the level of the vaginal cuff, extending into the vagina, \par demonstrating markedly FDG uptake with SUV 18.9 (image 207).\par \par ABDOMINOPELVIC LYMPH NODES/RETROPERITONEUM: Multiple FDG avid enlarged \par lymph nodes in the retroperitoneum and pelvis, unchanged in size when \par compared to CT dated 6/6/2023. For reference:\par Left common iliac lymph node measuring up to  2.1 x 1.9 cm, SUV 14.4 \par (image 176).\par Right external iliac lymph node measuring up to 2.9 x 2.4 cm, SUV 15.1 \par (image 202).\par Left obturator conglomerate of lymph nodes measuring up to 2.7 x 1.3 cm, \par SUV 9.6 (image 197).\par \par ESOPHAGUS/STOMACH/BOWEL/PERITONEUM/MESENTERY: No abnormal FDG activity.\par \par VESSELS: Atherosclerotic changes. No aneurysm.\par \par BONES/SOFT TISSUES: Minimally avid arthritic type changes at the right \par greater than left glenohumeral joints and right greater than left hips. \par Degenerative changes of the spine. Small fat-containing umbilical hernia.\par \par IMPRESSION: Abnormal skull-to-thigh FDG-PET/CT scan.\par 1. FDG avid lobular soft tissue mass at the level of the vaginal cuff, \par extending into the vagina, is unchanged in size when compared to recent \par CT abdomen and pelvis and is consistent with patient's biopsy-proven \par squamous cell carcinoma.\par 2. FDG avid retroperitoneal and pelvic lymph nodes consistent with \par metastatic disease.\par 3. Mildly FDG avid right axillary and subcarinal lymph nodes are \par nonspecific.\par 4. New small non-FDG avid pericardial effusion.\par 5. Interval placement of right-sided nephrostomy tube with resolved \par hydronephrosis. Unchanged left mild hydronephrosis.\par \par Today, patient is feeling well overall. She states that she usually feels well for 2-3 days at a time and then starts to feel very fatigued. Has decreased appetite, is eating very small amounts of food each day. Has nephro shakes each day as supplementation. She reports losing 18 lbs in the last month. Reports frequent constipation, but relates constipation to not eating much throughout the day. Reports dry cough x 5 weeks.\par \par Jaqueline's daughters live in [place] and they are interested in transferring care to [place] eventually. \par \par Medications: pravastatin 20mg, caltrate, amlodipine 5mg, \par \par Allergies: NKDA\par \par Med Hx: ESRD (Tues/Thurs/Sat HD), HTN, cervical cancer 2008\par [facility] in [place]\par \par FamHx: granddaughter Hodgkins Lymphoma \par \par Social: Lives with grandson

## 2023-07-10 NOTE — HISTORY OF PRESENT ILLNESS
[de-identified] : 83 year old female with recurrent squamous cell carcinoma vagina/cervical presents today with her daughter for consideration for neoadjuvant chemotherapy. 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 \par on 6/6/23 patient went to [facility] ED for nausea and diarrhea, including with melena, for ~1 week. Found to be in acute renal failure 2/2 an obstructing soft tissue mass inseparable from the cervical remnant on CT scan. Patient is s/p MILVIA, BSO in 2008 when she was initially diagnosed with cervical cancer. Patient was discussed at GYN ONC tumor board on 6/14/23 and recommended chemotherapy with palliative pelvic RT. \par \par Patient was seen by Dr Rahman and PET scan was performed on 7/2/23 revealing FDG-avid soft tissue mass at vaginal cuff extending into the vagina,  FDG avid retroperitoneal and pelvic lymph nodes consistent with metastatic disease.\par \par Had recent admission on 7/4/23 at [facility] for weakness, found to have UTI. Was discharged on 7/9/23 after course of IV abx. R nephrostomy tube in place due to hydronephrosis.\par \par PATH 6/7/23\par 1-Vaginal mass biopsy\par Final Diagnosis\par 1.   Vaginal mass biopsy\par - Superficial fragments of squamous cell carcinoma\par \par CT A/P 7/4/23\par LOWER CHEST: Trace bilateral pleural effusions, decreased from prior. \par Bibasilar atelectasis. 6 mm right middle lobe peripheral opacity not seen \par on prior, may represent intrapulmonary lymph node versus atelectasis. \par Left atrial enlargement. Aortic and coronary artery calcifications.\par LIVER: Within normal limits.\par BILE DUCTS: Normal caliber.\par GALLBLADDER: Cholecystectomy.\par SPLEEN: Within normal limits.\par PANCREAS: Cystic lesion in the pancreatic body measuring 1 cm is again \par seen (301-38). No pancreatic duct dilation.\par ADRENALS: Within normal limits.\par KIDNEYS/URETERS: Status post right nephrostomy tube placement improved \par with resolution of right hydroureteronephrosis. Mild left-sided \par hydroureteronephrosis to the level of the UVJ is unchanged. Left sided \par perinephric edema is again seen. Left renal cyst.\par BLADDER: Minimally distended.\par REPRODUCTIVE ORGANS: Status post hysterectomy with soft tissue mass \par arising from the right aspect of the cervical remnant measuring up to 4.6 \par cm.\par BOWEL: No bowel obstruction. Appendix is not visualized. No evidence of \par inflammation in the pericecal region. Colonic diverticulosis.\par PERITONEUM: No ascites.\par VESSELS: Atherosclerotic changes.\par RETROPERITONEUM/LYMPH NODES: Stable left para-aortic and bilateral pelvic \par lymphadenopathy, largest measuring 2.5 cm (301-95).).\par ABDOMINAL WALL: Postsurgical changes.\par BONES: Degenerative changes. Mild anterolisthesis of L4 on L5.\par IMPRESSION:\par No bowel obstruction.\par Resolution of right-sided hydroureteronephrosis status post right \par nephrostomy tube placement.\par Unchanged mild left hydroureteronephrosis.\par  Redemonstration of mass emanating from the vaginal cuff with pelvic and \par retroperitoneal lymphadenopathy unchanged\par --- End of Report---\par \par \par PET 7/2/23\par HEAD/NECK: Physiologic FDG activity in visualized brain, head, and neck.\par \par THORAX: Mildly FDG avid right axillary 0.8 cm lymph node, SUV 2.5 (image \par 77). Nonavid left supraclavicular lymph node is decreased in size from CT \par chest 6/7/2023 measuring 0.9 x 0.6 cm, previously 1.5 x 0.9 cm (image \par 56). Nonavid enlarged subcarinal lymph node is unchanged in size, \par measuring up to 1.5 cm in short axis (image 90). More superiorly within \par the subcarinal region, there is a focus of increased FDG uptake without \par discrete CT correlate is likely corresponding to subcarinal lymph node \par with SUV 3.3 (image 83). Right-sided dialysis catheter with tip in \par superior cavoatrial junction.\par \par LUNGS: Linear focus of increased FDG avidity in the left lung base (SUV \par 3.9 image 99) likely representing reexpansion. Bibasilar atelectasis, \par decreased from prior. Nonavid linear atelectasis versus scarring in the \par right middle lobe. Previously described 5 mm nodules within the right \par upper and right lower lobe are not well evaluated on current PET/CT due \par to differences in technique.\par \par PLEURA/PERICARDIUM: No abnormal FDG activity. Trace bilateral pleural \par effusions, decreased since 6/7/2023. New small non-FDG avid pericardial \par effusion.\par \par HEPATOBILIARY/PANCREAS: Physiologic FDG activity.  For reference, normal \par liver demonstrates SUV mean 3.5. Cholecystectomy. A 1 cm cystic lesion in \par the pancreas is photopenic (image 135).\par \par SPLEEN: Physiologic FDG activity. Normal in size.\par \par ADRENAL GLANDS: No abnormal FDG activity. No nodule.\par \par KIDNEYS/URINARY BLADDER: Physiologic excreted FDG activity. Interval \par placement of right-sided nephrostomy with pigtail in the renal pelvis. No \par hydronephrosis. Unchanged mild left hydronephrosis.\par \par REPRODUCTIVE ORGANS: Hysterectomy. Unchanged appearance of lobular soft \par tissue mass at the level of the vaginal cuff, extending into the vagina, \par demonstrating markedly FDG uptake with SUV 18.9 (image 207).\par \par ABDOMINOPELVIC LYMPH NODES/RETROPERITONEUM: Multiple FDG avid enlarged \par lymph nodes in the retroperitoneum and pelvis, unchanged in size when \par compared to CT dated 6/6/2023. For reference:\par Left common iliac lymph node measuring up to  2.1 x 1.9 cm, SUV 14.4 \par (image 176).\par Right external iliac lymph node measuring up to 2.9 x 2.4 cm, SUV 15.1 \par (image 202).\par Left obturator conglomerate of lymph nodes measuring up to 2.7 x 1.3 cm, \par SUV 9.6 (image 197).\par \par ESOPHAGUS/STOMACH/BOWEL/PERITONEUM/MESENTERY: No abnormal FDG activity.\par \par VESSELS: Atherosclerotic changes. No aneurysm.\par \par BONES/SOFT TISSUES: Minimally avid arthritic type changes at the right \par greater than left glenohumeral joints and right greater than left hips. \par Degenerative changes of the spine. Small fat-containing umbilical hernia.\par \par IMPRESSION: Abnormal skull-to-thigh FDG-PET/CT scan.\par 1. FDG avid lobular soft tissue mass at the level of the vaginal cuff, \par extending into the vagina, is unchanged in size when compared to recent \par CT abdomen and pelvis and is consistent with patient's biopsy-proven \par squamous cell carcinoma.\par 2. FDG avid retroperitoneal and pelvic lymph nodes consistent with \par metastatic disease.\par 3. Mildly FDG avid right axillary and subcarinal lymph nodes are \par nonspecific.\par 4. New small non-FDG avid pericardial effusion.\par 5. Interval placement of right-sided nephrostomy tube with resolved \par hydronephrosis. Unchanged left mild hydronephrosis.\par \par Today, patient is feeling well overall. She states that she usually feels well for 2-3 days at a time and then starts to feel very fatigued. Has decreased appetite, is eating very small amounts of food each day. Has nephro shakes each day as supplementation. She reports losing 18 lbs in the last month. Reports frequent constipation, but relates constipation to not eating much throughout the day. Reports dry cough x 5 weeks.\par \par Jaqueline's daughters live in [place] and they are interested in transferring care to [place] eventually. \par \par Medications: pravastatin 20mg, caltrate, amlodipine 5mg, \par \par Allergies: NKDA\par \par Med Hx: ESRD (Tues/Thurs/Sat HD), HTN, cervical cancer 2008\par [facility] in [place]\par \par FamHx: granddaughter Hodgkins Lymphoma \par \par Social: Lives with grandson

## 2023-07-10 NOTE — GOALS
[Patient] : patient [Staff: _____] : staff - [unfilled] [Healthcare proxy document is in chart] : Healthcare proxy document is in chart [Time Spent: ___ minutes] : I, personally, spent [unfilled] minutes on advance care planning services with the patient.  This time is separate and distinct from any other care management services provided on this date [FreeTextEntry1] : Veronica Vizcarra [FreeTextEntry2] : dtr [FreeTextEntry3] : [phone] [FreeTextEntry6] : Viviane Villalta [FreeTextEntry5] : dtr [FreeTextEntry4] : [phone] [FreeTextEntry7] : Pt verbalized she is eager to start tx. Plan is for neoadjuvant treatment Carboplatin and Taxol every 3 weeks beginning 7/19/12 for 3 cycles, with interval scans to assess treatment response. Chemotherapy will be followed by palliative pelvic RT.

## 2023-07-11 LAB — APTT BLD: 28 SEC

## 2023-07-11 RX ORDER — LEVOTHYROXINE SODIUM 100 UG/1
100 TABLET ORAL DAILY
Refills: 0 | Status: DISCONTINUED | COMMUNITY
Start: 2023-06-30 | End: 2023-07-11

## 2023-07-17 ENCOUNTER — APPOINTMENT (OUTPATIENT)
Dept: GYNECOLOGIC ONCOLOGY | Facility: CLINIC | Age: 83
End: 2023-07-17
Payer: MEDICARE

## 2023-07-17 VITALS
HEART RATE: 111 BPM | BODY MASS INDEX: 33.61 KG/M2 | DIASTOLIC BLOOD PRESSURE: 67 MMHG | HEIGHT: 61 IN | SYSTOLIC BLOOD PRESSURE: 144 MMHG | WEIGHT: 178 LBS

## 2023-07-17 DIAGNOSIS — I10 ESSENTIAL (PRIMARY) HYPERTENSION: ICD-10-CM

## 2023-07-17 DIAGNOSIS — K42.9 UMBILICAL HERNIA W/OUT OBSTRUCTION OR GANGRENE: ICD-10-CM

## 2023-07-17 DIAGNOSIS — N95.0 POSTMENOPAUSAL BLEEDING: ICD-10-CM

## 2023-07-17 DIAGNOSIS — N88.8 OTHER SPECIFIED NONINFLAMMATORY DISORDERS OF CERVIX UTERI: ICD-10-CM

## 2023-07-17 DIAGNOSIS — C57.9 MALIGNANT NEOPLASM OF FEMALE GENITAL ORGAN, UNSPECIFIED: ICD-10-CM

## 2023-07-17 DIAGNOSIS — N17.9 ACUTE KIDNEY FAILURE, UNSPECIFIED: ICD-10-CM

## 2023-07-17 DIAGNOSIS — C52 MALIGNANT NEOPLASM OF VAGINA: ICD-10-CM

## 2023-07-17 DIAGNOSIS — N13.30 UNSPECIFIED HYDRONEPHROSIS: ICD-10-CM

## 2023-07-17 DIAGNOSIS — E78.5 HYPERLIPIDEMIA, UNSPECIFIED: ICD-10-CM

## 2023-07-17 DIAGNOSIS — R59.1 GENERALIZED ENLARGED LYMPH NODES: ICD-10-CM

## 2023-07-17 PROCEDURE — 99214 OFFICE O/P EST MOD 30 MIN: CPT

## 2023-07-17 RX ORDER — PROCHLORPERAZINE MALEATE 10 MG/1
10 TABLET ORAL EVERY 6 HOURS
Qty: 20 | Refills: 6 | Status: DISCONTINUED | COMMUNITY
Start: 2023-07-10 | End: 2023-07-17

## 2023-07-17 RX ORDER — DEXAMETHASONE 4 MG/1
4 TABLET ORAL
Qty: 5 | Refills: 0 | Status: DISCONTINUED | COMMUNITY
Start: 2023-07-10 | End: 2023-07-17

## 2023-07-17 RX ORDER — LIDOCAINE AND PRILOCAINE 25; 25 MG/G; MG/G
2.5-2.5 CREAM TOPICAL
Qty: 1 | Refills: 1 | Status: DISCONTINUED | COMMUNITY
Start: 2023-07-10 | End: 2023-07-17

## 2023-07-17 RX ORDER — CYANOCOBALAMIN (VITAMIN B-12) 1000 MCG
1000 TABLET ORAL DAILY
Refills: 0 | Status: DISCONTINUED | COMMUNITY
Start: 2023-06-30 | End: 2023-07-17

## 2023-07-17 RX ORDER — PANTOPRAZOLE SODIUM 40 MG/1
40 TABLET, DELAYED RELEASE ORAL
Refills: 0 | Status: DISCONTINUED | COMMUNITY
Start: 2023-06-30 | End: 2023-07-17

## 2023-07-17 RX ORDER — NYSTATIN 100000 [USP'U]/ML
100000 SUSPENSION ORAL
Refills: 0 | Status: DISCONTINUED | COMMUNITY
Start: 2023-07-11 | End: 2023-07-17

## 2023-07-17 RX ORDER — SENNOSIDES 8.6 MG TABLETS 8.6 MG/1
8.6 TABLET ORAL
Refills: 0 | Status: DISCONTINUED | COMMUNITY
Start: 2023-07-11 | End: 2023-07-17

## 2023-07-17 RX ORDER — AMLODIPINE BESYLATE 5 MG/1
5 TABLET ORAL DAILY
Refills: 0 | Status: DISCONTINUED | COMMUNITY
Start: 2023-06-30 | End: 2023-07-17

## 2023-07-18 LAB
ACRM BINDING ANTIBODY: 0 NMOL/L — SIGNIFICANT CHANGE UP
INTERPRETATION MG: SIGNIFICANT CHANGE UP
P/Q TYPE ANTIBODY: 0 NMOL/L — SIGNIFICANT CHANGE UP

## 2023-07-19 ENCOUNTER — APPOINTMENT (OUTPATIENT)
Dept: INFUSION THERAPY | Facility: HOSPITAL | Age: 83
End: 2023-07-19

## 2023-07-19 ENCOUNTER — APPOINTMENT (OUTPATIENT)
Dept: HEMATOLOGY ONCOLOGY | Facility: CLINIC | Age: 83
End: 2023-07-19
Payer: MEDICARE

## 2023-07-19 ENCOUNTER — RESULT REVIEW (OUTPATIENT)
Age: 83
End: 2023-07-19

## 2023-07-19 ENCOUNTER — NON-APPOINTMENT (OUTPATIENT)
Age: 83
End: 2023-07-19

## 2023-07-19 ENCOUNTER — APPOINTMENT (OUTPATIENT)
Dept: HEMATOLOGY ONCOLOGY | Facility: CLINIC | Age: 83
End: 2023-07-19

## 2023-07-19 LAB
ANION GAP SERPL CALC-SCNC: 15 MMOL/L — SIGNIFICANT CHANGE UP (ref 5–17)
BASOPHILS # BLD AUTO: 0.06 K/UL — SIGNIFICANT CHANGE UP (ref 0–0.2)
BASOPHILS NFR BLD AUTO: 0.5 % — SIGNIFICANT CHANGE UP (ref 0–2)
BUN SERPL-MCNC: 42 MG/DL — HIGH (ref 7–23)
CALCIUM SERPL-MCNC: 9.2 MG/DL — SIGNIFICANT CHANGE UP (ref 8.4–10.5)
CHLORIDE SERPL-SCNC: 95 MMOL/L — LOW (ref 96–108)
CO2 SERPL-SCNC: 24 MMOL/L — SIGNIFICANT CHANGE UP (ref 22–31)
CREAT SERPL-MCNC: 8.76 MG/DL — HIGH (ref 0.5–1.3)
EGFR: 4 ML/MIN/1.73M2 — LOW
EOSINOPHIL # BLD AUTO: 0 K/UL — SIGNIFICANT CHANGE UP (ref 0–0.5)
EOSINOPHIL NFR BLD AUTO: 0 % — SIGNIFICANT CHANGE UP (ref 0–6)
GLUCOSE SERPL-MCNC: 201 MG/DL — HIGH (ref 70–99)
HCT VFR BLD CALC: 28.5 % — LOW (ref 34.5–45)
HGB BLD-MCNC: 9.2 G/DL — LOW (ref 11.5–15.5)
IMM GRANULOCYTES NFR BLD AUTO: 2.8 % — HIGH (ref 0–0.9)
LYMPHOCYTES # BLD AUTO: 1.29 K/UL — SIGNIFICANT CHANGE UP (ref 1–3.3)
LYMPHOCYTES # BLD AUTO: 9.9 % — LOW (ref 13–44)
MCHC RBC-ENTMCNC: 28.6 PG — SIGNIFICANT CHANGE UP (ref 27–34)
MCHC RBC-ENTMCNC: 32.3 G/DL — SIGNIFICANT CHANGE UP (ref 32–36)
MCV RBC AUTO: 88.5 FL — SIGNIFICANT CHANGE UP (ref 80–100)
MONOCYTES # BLD AUTO: 0.08 K/UL — SIGNIFICANT CHANGE UP (ref 0–0.9)
MONOCYTES NFR BLD AUTO: 0.6 % — LOW (ref 2–14)
NEUTROPHILS # BLD AUTO: 11.19 K/UL — HIGH (ref 1.8–7.4)
NEUTROPHILS NFR BLD AUTO: 86.2 % — HIGH (ref 43–77)
NRBC # BLD: 0 /100 WBCS — SIGNIFICANT CHANGE UP (ref 0–0)
PLATELET # BLD AUTO: 525 K/UL — HIGH (ref 150–400)
POTASSIUM SERPL-MCNC: 3.8 MMOL/L — SIGNIFICANT CHANGE UP (ref 3.5–5.3)
POTASSIUM SERPL-SCNC: 3.8 MMOL/L — SIGNIFICANT CHANGE UP (ref 3.5–5.3)
RBC # BLD: 3.22 M/UL — LOW (ref 3.8–5.2)
RBC # FLD: 13.4 % — SIGNIFICANT CHANGE UP (ref 10.3–14.5)
SODIUM SERPL-SCNC: 135 MMOL/L — SIGNIFICANT CHANGE UP (ref 135–145)
WBC # BLD: 12.99 K/UL — HIGH (ref 3.8–10.5)
WBC # FLD AUTO: 12.99 K/UL — HIGH (ref 3.8–10.5)

## 2023-07-19 PROCEDURE — 99213 OFFICE O/P EST LOW 20 MIN: CPT

## 2023-07-19 NOTE — HISTORY OF PRESENT ILLNESS
[de-identified] : 83 year old female with recurrent squamous cell carcinoma vagina/cervix presents today with her daughter for consideration for neoadjuvant chemotherapy. 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 \par on 6/6/23 patient went to [facility] ED for nausea and diarrhea, including with melena, for ~1 week. Found to be in acute renal failure 2/2 an obstructing soft tissue mass inseparable from the cervical remnant on CT scan. Patient is s/p MILVIA, BSO in 2008 when she was initially diagnosed with cervical cancer. Patient was discussed at [facility]YN [facility]C tumor board on 6/14/23 and recommended chemotherapy with palliative pelvic RT. \par \par Patient was seen by Dr Rahman and PET scan was performed on 7/2/23 revealing FDG-avid soft tissue mass at vaginal cuff extending into the vagina,  FDG avid retroperitoneal and pelvic lymph nodes consistent with metastatic disease.\par \par Had recent admission on 7/4/23 at [facility] for weakness, found to have UTI. Was discharged on 7/9/23 after course of IV abx. R nephrostomy tube in place due to hydronephrosis.\par \par PATH 6/7/23\par 1-Vaginal mass biopsy\par Final Diagnosis\par 1.   Vaginal mass biopsy\par - Superficial fragments of squamous cell carcinoma\par \par CT A/P 7/4/23\par LOWER CHEST: Trace bilateral pleural effusions, decreased from prior. \par Bibasilar atelectasis. 6 mm right middle lobe peripheral opacity not seen \par on prior, may represent intrapulmonary lymph node versus atelectasis. \par Left atrial enlargement. Aortic and coronary artery calcifications.\par LIVER: Within normal limits.\par BILE DUCTS: Normal caliber.\par GALLBLADDER: Cholecystectomy.\par SPLEEN: Within normal limits.\par PANCREAS: Cystic lesion in the pancreatic body measuring 1 cm is again \par seen (301-38). No pancreatic duct dilation.\par ADRENALS: Within normal limits.\par KIDNEYS/URETERS: Status post right nephrostomy tube placement improved \par with resolution of right hydroureteronephrosis. Mild left-sided \par hydroureteronephrosis to the level of the UVJ is unchanged. Left sided \par perinephric edema is again seen. Left renal cyst.\par BLADDER: Minimally distended.\par REPRODUCTIVE ORGANS: Status post hysterectomy with soft tissue mass \par arising from the right aspect of the cervical remnant measuring up to 4.6 \par cm.\par BOWEL: No bowel obstruction. Appendix is not visualized. No evidence of \par inflammation in the pericecal region. Colonic diverticulosis.\par PERITONEUM: No ascites.\par VESSELS: Atherosclerotic changes.\par RETROPERITONEUM/LYMPH NODES: Stable left para-aortic and bilateral pelvic \par lymphadenopathy, largest measuring 2.5 cm (301-95).).\par ABDOMINAL WALL: Postsurgical changes.\par BONES: Degenerative changes. Mild anterolisthesis of L4 on L5.\par IMPRESSION:\par No bowel obstruction.\par Resolution of right-sided hydroureteronephrosis status post right \par nephrostomy tube placement.\par Unchanged mild left hydroureteronephrosis.\par  Redemonstration of mass emanating from the vaginal cuff with pelvic and \par retroperitoneal lymphadenopathy unchanged\par --- End of Report---\par \par \par PET 7/2/23\par HEAD/NECK: Physiologic FDG activity in visualized brain, head, and neck.\par \par THORAX: Mildly FDG avid right axillary 0.8 cm lymph node, SUV 2.5 (image \par 77). Nonavid left supraclavicular lymph node is decreased in size from CT \par chest 6/7/2023 measuring 0.9 x 0.6 cm, previously 1.5 x 0.9 cm (image \par 56). Nonavid enlarged subcarinal lymph node is unchanged in size, \par measuring up to 1.5 cm in short axis (image 90). More superiorly within \par the subcarinal region, there is a focus of increased FDG uptake without \par discrete CT correlate is likely corresponding to subcarinal lymph node \par with SUV 3.3 (image 83). Right-sided dialysis catheter with tip in \par superior cavoatrial junction.\par \par LUNGS: Linear focus of increased FDG avidity in the left lung base (SUV \par 3.9 image 99) likely representing reexpansion. Bibasilar atelectasis, \par decreased from prior. Nonavid linear atelectasis versus scarring in the \par right middle lobe. Previously described 5 mm nodules within the right \par upper and right lower lobe are not well evaluated on current PET/CT due \par to differences in technique.\par \par PLEURA/PERICARDIUM: No abnormal FDG activity. Trace bilateral pleural \par effusions, decreased since 6/7/2023. New small non-FDG avid pericardial \par effusion.\par \par HEPATOBILIARY/PANCREAS: Physiologic FDG activity.  For reference, normal \par liver demonstrates SUV mean 3.5. Cholecystectomy. A 1 cm cystic lesion in \par the pancreas is photopenic (image 135).\par \par SPLEEN: Physiologic FDG activity. Normal in size.\par \par ADRENAL GLANDS: No abnormal FDG activity. No nodule.\par \par KIDNEYS/URINARY BLADDER: Physiologic excreted FDG activity. Interval \par placement of right-sided nephrostomy with pigtail in the renal pelvis. No \par hydronephrosis. Unchanged mild left hydronephrosis.\par \par REPRODUCTIVE ORGANS: Hysterectomy. Unchanged appearance of lobular soft \par tissue mass at the level of the vaginal cuff, extending into the vagina, \par demonstrating markedly FDG uptake with SUV 18.9 (image 207).\par \par ABDOMINOPELVIC LYMPH NODES/RETROPERITONEUM: Multiple FDG avid enlarged \par lymph nodes in the retroperitoneum and pelvis, unchanged in size when \par compared to CT dated 6/6/2023. For reference:\par Left common iliac lymph node measuring up to  2.1 x 1.9 cm, SUV 14.4 \par (image 176).\par Right external iliac lymph node measuring up to 2.9 x 2.4 cm, SUV 15.1 \par (image 202).\par Left obturator conglomerate of lymph nodes measuring up to 2.7 x 1.3 cm, \par SUV 9.6 (image 197).\par \par ESOPHAGUS/STOMACH/BOWEL/PERITONEUM/MESENTERY: No abnormal FDG activity.\par \par VESSELS: Atherosclerotic changes. No aneurysm.\par \par BONES/SOFT TISSUES: Minimally avid arthritic type changes at the right \par greater than left glenohumeral joints and right greater than left hips. \par Degenerative changes of the spine. Small fat-containing umbilical hernia.\par \par IMPRESSION: Abnormal skull-to-thigh FDG-PET/CT scan.\par 1. FDG avid lobular soft tissue mass at the level of the vaginal cuff, \par extending into the vagina, is unchanged in size when compared to recent \par CT abdomen and pelvis and is consistent with patient's biopsy-proven \par squamous cell carcinoma.\par 2. FDG avid retroperitoneal and pelvic lymph nodes consistent with \par metastatic disease.\par 3. Mildly FDG avid right axillary and subcarinal lymph nodes are \par nonspecific.\par 4. New small non-FDG avid pericardial effusion.\par 5. Interval placement of right-sided nephrostomy tube with resolved \par hydronephrosis. Unchanged left mild hydronephrosis.\par \par Today, patient is feeling well overall. She states that she usually feels well for 2-3 days at a time and then starts to feel very fatigued. Has decreased appetite, is eating very small amounts of food each day. Has nephro shakes each day as supplementation. She reports losing 18 lbs in the last month. Reports frequent constipation, but relates constipation to not eating much throughout the day. Reports dry cough x 5 weeks.\par \par Jaqueline's daughters live in [place] and they are interested in transferring care to [place] eventually. \par \par Medications: pravastatin 20mg, caltrate, amlodipine 5mg, \par \par Allergies: NKDA\par \par Med Hx: ESRD (Tues/Thurs/Sat HD), HTN, cervical cancer 2008\par [facility] in [place]\par \par FamHx: granddaughter Hodgkins Lymphoma \par \par Social: Lives with grandson  [FreeTextEntry1] : neoadjuvant carbo/taxol started 7/19/23 [de-identified] : Patient presents today for cycle 1 neoadjuvant carbo/taxol. In the interim, was seen by Dr Pretty who recommended neoadjuvant chemotherapy. Patient was scheduled for hemodialysis yesterday but was unable to get dialyzed due to malfunctioning catheter. Patient scheduled to have catheter replaced and receive dialysis tomorrow. Patient reports feeling well. Her appetite has improved and is now eating larger meals. Having regular bowel movements. Has baseline occasional neuropathy symptoms on b/l soles of feet. Denies fever, chills, mouth sores, CP, palpitations, cough, ALLEN, n/v/d/c, abdominal pain, LE edema, vaginal discharge or bleeding, urinary symptoms, excessive bruising, dizziness, headaches, arthralgias, myalgias.

## 2023-07-19 NOTE — REVIEW OF SYSTEMS
[Negative] : Respiratory [Fever] : no fever [Chills] : no chills [Night Sweats] : no night sweats [Shortness Of Breath] : no shortness of breath [Wheezing] : no wheezing [SOB on Exertion] : no shortness of breath during exertion [FreeTextEntry8] : R nephrostomy tube

## 2023-07-19 NOTE — PHYSICAL EXAM
[Restricted in physically strenuous activity but ambulatory and able to carry out work of a light or sedentary nature] : Status 1- Restricted in physically strenuous activity but ambulatory and able to carry out work of a light or sedentary nature, e.g., light house work, office work [Normal] : affect appropriate [de-identified] : HD catheter R chest wall [de-identified] : R nephrostomy tube

## 2023-07-19 NOTE — REASON FOR VISIT
[Follow-Up Visit] : a follow-up [FreeTextEntry2] : squamous cell carcinoma of vaginal vs cervical origin

## 2023-07-20 ENCOUNTER — NON-APPOINTMENT (OUTPATIENT)
Age: 83
End: 2023-07-20

## 2023-07-20 DIAGNOSIS — C53.9 MALIGNANT NEOPLASM OF CERVIX UTERI, UNSPECIFIED: ICD-10-CM

## 2023-07-20 DIAGNOSIS — Z51.11 ENCOUNTER FOR ANTINEOPLASTIC CHEMOTHERAPY: ICD-10-CM

## 2023-07-20 DIAGNOSIS — R11.2 NAUSEA WITH VOMITING, UNSPECIFIED: ICD-10-CM

## 2023-07-21 ENCOUNTER — APPOINTMENT (OUTPATIENT)
Dept: OPHTHALMOLOGY | Facility: EYE CENTER | Age: 83
End: 2023-07-21

## 2023-07-21 ENCOUNTER — NON-APPOINTMENT (OUTPATIENT)
Age: 83
End: 2023-07-21

## 2023-07-22 ENCOUNTER — APPOINTMENT (OUTPATIENT)
Dept: OPHTHALMOLOGY | Facility: CLINIC | Age: 83
End: 2023-07-22

## 2023-08-02 ENCOUNTER — OUTPATIENT (OUTPATIENT)
Dept: OUTPATIENT SERVICES | Facility: HOSPITAL | Age: 83
LOS: 1 days | End: 2023-08-02
Payer: MEDICARE

## 2023-08-02 ENCOUNTER — APPOINTMENT (OUTPATIENT)
Dept: INFUSION THERAPY | Facility: HOSPITAL | Age: 83
End: 2023-08-02

## 2023-08-02 ENCOUNTER — APPOINTMENT (OUTPATIENT)
Dept: RADIOLOGY | Facility: IMAGING CENTER | Age: 83
End: 2023-08-02
Payer: MEDICARE

## 2023-08-02 ENCOUNTER — APPOINTMENT (OUTPATIENT)
Dept: HEMATOLOGY ONCOLOGY | Facility: CLINIC | Age: 83
End: 2023-08-02
Payer: MEDICARE

## 2023-08-02 VITALS
HEART RATE: 98 BPM | OXYGEN SATURATION: 97 % | SYSTOLIC BLOOD PRESSURE: 127 MMHG | HEIGHT: 60.98 IN | DIASTOLIC BLOOD PRESSURE: 72 MMHG | RESPIRATION RATE: 17 BRPM | TEMPERATURE: 208.22 F | WEIGHT: 166.89 LBS | BODY MASS INDEX: 31.51 KG/M2

## 2023-08-02 DIAGNOSIS — C57.9 MALIGNANT NEOPLASM OF FEMALE GENITAL ORGAN, UNSPECIFIED: ICD-10-CM

## 2023-08-02 DIAGNOSIS — Z90.49 ACQUIRED ABSENCE OF OTHER SPECIFIED PARTS OF DIGESTIVE TRACT: Chronic | ICD-10-CM

## 2023-08-02 DIAGNOSIS — Z90.710 ACQUIRED ABSENCE OF BOTH CERVIX AND UTERUS: Chronic | ICD-10-CM

## 2023-08-02 PROCEDURE — 71046 X-RAY EXAM CHEST 2 VIEWS: CPT | Mod: 26

## 2023-08-02 PROCEDURE — 71046 X-RAY EXAM CHEST 2 VIEWS: CPT

## 2023-08-02 PROCEDURE — 99214 OFFICE O/P EST MOD 30 MIN: CPT

## 2023-08-02 NOTE — REASON FOR VISIT
[Follow-Up Visit] : a follow-up [Other: _____] : [unfilled] [FreeTextEntry2] : squamous cell carcinoma of vaginal vs cervical origin

## 2023-08-02 NOTE — REVIEW OF SYSTEMS
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

## 2023-08-02 NOTE — HISTORY OF PRESENT ILLNESS
[de-identified] : 83 year old female with recurrent squamous cell carcinoma vagina/cervical presents today with her daughter for consideration for neoadjuvant chemotherapy.  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  on 6/6/23 patient went to [facility] ED for nausea and diarrhea, including with melena, for ~1 week. Found to be in acute renal failure 2/2 an obstructing soft tissue mass inseparable from the cervical remnant on CT scan. Patient is s/p MILVIA, BSO in 2008 when she was initially diagnosed with cervical cancer. Patient was discussed at GYN ONC tumor board on 6/14/23 and recommended chemotherapy with palliative pelvic RT.   Patient was seen by Dr Rahman and PET scan was performed on 7/2/23 revealing FDG-avid soft tissue mass at vaginal cuff extending into the vagina,  FDG avid retroperitoneal and pelvic lymph nodes consistent with metastatic disease.  Had recent admission on 7/4/23 at [facility] for weakness, found to have UTI. Was discharged on 7/9/23 after course of IV abx. R nephrostomy tube in place due to hydronephrosis.  PATH 6/7/23 1-Vaginal mass biopsy Final Diagnosis 1.   Vaginal mass biopsy - Superficial fragments of squamous cell carcinoma  CT A/P 7/4/23 LOWER CHEST: Trace bilateral pleural effusions, decreased from prior.  Bibasilar atelectasis. 6 mm right middle lobe peripheral opacity not seen  on prior, may represent intrapulmonary lymph node versus atelectasis.  Left atrial enlargement. Aortic and coronary artery calcifications. LIVER: Within normal limits. BILE DUCTS: Normal caliber. GALLBLADDER: Cholecystectomy. SPLEEN: Within normal limits. PANCREAS: Cystic lesion in the pancreatic body measuring 1 cm is again  seen (301-38). No pancreatic duct dilation. ADRENALS: Within normal limits. KIDNEYS/URETERS: Status post right nephrostomy tube placement improved  with resolution of right hydroureteronephrosis. Mild left-sided  hydroureteronephrosis to the level of the UVJ is unchanged. Left sided  perinephric edema is again seen. Left renal cyst. BLADDER: Minimally distended. REPRODUCTIVE ORGANS: Status post hysterectomy with soft tissue mass  arising from the right aspect of the cervical remnant measuring up to 4.6  cm. BOWEL: No bowel obstruction. Appendix is not visualized. No evidence of  inflammation in the pericecal region. Colonic diverticulosis. PERITONEUM: No ascites. VESSELS: Atherosclerotic changes. RETROPERITONEUM/LYMPH NODES: Stable left para-aortic and bilateral pelvic  lymphadenopathy, largest measuring 2.5 cm (301-95).). ABDOMINAL WALL: Postsurgical changes. BONES: Degenerative changes. Mild anterolisthesis of L4 on L5. IMPRESSION: No bowel obstruction. Resolution of right-sided hydroureteronephrosis status post right  nephrostomy tube placement. Unchanged mild left hydroureteronephrosis.  Redemonstration of mass emanating from the vaginal cuff with pelvic and  retroperitoneal lymphadenopathy unchanged --- End of Report---   PET 7/2/23 HEAD/NECK: Physiologic FDG activity in visualized brain, head, and neck.  THORAX: Mildly FDG avid right axillary 0.8 cm lymph node, SUV 2.5 (image  77). Nonavid left supraclavicular lymph node is decreased in size from CT  chest 6/7/2023 measuring 0.9 x 0.6 cm, previously 1.5 x 0.9 cm (image  56). Nonavid enlarged subcarinal lymph node is unchanged in size,  measuring up to 1.5 cm in short axis (image 90). More superiorly within  the subcarinal region, there is a focus of increased FDG uptake without  discrete CT correlate is likely corresponding to subcarinal lymph node  with SUV 3.3 (image 83). Right-sided dialysis catheter with tip in  superior cavoatrial junction.  LUNGS: Linear focus of increased FDG avidity in the left lung base (SUV  3.9 image 99) likely representing reexpansion. Bibasilar atelectasis,  decreased from prior. Nonavid linear atelectasis versus scarring in the  right middle lobe. Previously described 5 mm nodules within the right  upper and right lower lobe are not well evaluated on current PET/CT due  to differences in technique.  PLEURA/PERICARDIUM: No abnormal FDG activity. Trace bilateral pleural  effusions, decreased since 6/7/2023. New small non-FDG avid pericardial  effusion.  HEPATOBILIARY/PANCREAS: Physiologic FDG activity.  For reference, normal  liver demonstrates SUV mean 3.5. Cholecystectomy. A 1 cm cystic lesion in  the pancreas is photopenic (image 135).  SPLEEN: Physiologic FDG activity. Normal in size.  ADRENAL GLANDS: No abnormal FDG activity. No nodule.  KIDNEYS/URINARY BLADDER: Physiologic excreted FDG activity. Interval  placement of right-sided nephrostomy with pigtail in the renal pelvis. No  hydronephrosis. Unchanged mild left hydronephrosis.  REPRODUCTIVE ORGANS: Hysterectomy. Unchanged appearance of lobular soft  tissue mass at the level of the vaginal cuff, extending into the vagina,  demonstrating markedly FDG uptake with SUV 18.9 (image 207).  ABDOMINOPELVIC LYMPH NODES/RETROPERITONEUM: Multiple FDG avid enlarged  lymph nodes in the retroperitoneum and pelvis, unchanged in size when  compared to CT dated 6/6/2023. For reference: Left common iliac lymph node measuring up to  2.1 x 1.9 cm, SUV 14.4  (image 176). Right external iliac lymph node measuring up to 2.9 x 2.4 cm, SUV 15.1  (image 202). Left obturator conglomerate of lymph nodes measuring up to 2.7 x 1.3 cm,  SUV 9.6 (image 197).  ESOPHAGUS/STOMACH/BOWEL/PERITONEUM/MESENTERY: No abnormal FDG activity.  VESSELS: Atherosclerotic changes. No aneurysm.  BONES/SOFT TISSUES: Minimally avid arthritic type changes at the right  greater than left glenohumeral joints and right greater than left hips.  Degenerative changes of the spine. Small fat-containing umbilical hernia.  IMPRESSION: Abnormal skull-to-thigh FDG-PET/CT scan. 1. FDG avid lobular soft tissue mass at the level of the vaginal cuff,  extending into the vagina, is unchanged in size when compared to recent  CT abdomen and pelvis and is consistent with patient's biopsy-proven  squamous cell carcinoma. 2. FDG avid retroperitoneal and pelvic lymph nodes consistent with  metastatic disease. 3. Mildly FDG avid right axillary and subcarinal lymph nodes are  nonspecific. 4. New small non-FDG avid pericardial effusion. 5. Interval placement of right-sided nephrostomy tube with resolved  hydronephrosis. Unchanged left mild hydronephrosis.  Today, patient is feeling well overall. She states that she usually feels well for 2-3 days at a time and then starts to feel very fatigued. Has decreased appetite, is eating very small amounts of food each day. Has nephro shakes each day as supplementation. She reports losing 18 lbs in the last month. Reports frequent constipation, but relates constipation to not eating much throughout the day. Reports dry cough x 5 weeks.  Jaqueline's daughters live in [place] and they are interested in transferring care to [place] eventually.   Medications: pravastatin 20mg, caltrate, amlodipine 5mg,   Allergies: NKDA  Med Hx: ESRD (Tues/Thurs/Sat HD), HTN, cervical cancer 2008 [facility] in [place]  FamHx: granddaughter Hodgkins Lymphoma   Social: Lives with grandson  [FreeTextEntry1] : Carboplatin and Taxol s/p first cycle [de-identified] : Patient is here with her daughter after completing first cycle of chemo. She had arthralgias for the first week after chemo, got minimal relief from Tylenol, pain has resolved.  She had diarrhea starting 2 days after chemo and got relief from Immodium. She had weakness, fatigue and decreased appetite, lost about 4kg.  Appetite has improved over the past few days/. She had some nausea, no vomiting, got relief from anti-emetics. She has intermittent neuropathy of feet described as "sandpaper" to bottom of feet, intermittent, occurs at nighttime while resting. She reports decreased urinary output from nephrostomy over past 2 weeks with increased urination. Denies fever, chills, chest pain, SOB, abdominal pain, constipation. She had mediport placed last Friday. Patient and daughter would like to transfer her care to [place], possibly after second cycle of chemo.  She has not established care with anyone in [place] yet.

## 2023-08-04 ENCOUNTER — OUTPATIENT (OUTPATIENT)
Dept: OUTPATIENT SERVICES | Facility: HOSPITAL | Age: 83
LOS: 1 days | End: 2023-08-04
Payer: MEDICARE

## 2023-08-04 ENCOUNTER — RESULT REVIEW (OUTPATIENT)
Age: 83
End: 2023-08-04

## 2023-08-04 ENCOUNTER — NON-APPOINTMENT (OUTPATIENT)
Age: 83
End: 2023-08-04

## 2023-08-04 VITALS
HEART RATE: 106 BPM | RESPIRATION RATE: 16 BRPM | DIASTOLIC BLOOD PRESSURE: 64 MMHG | OXYGEN SATURATION: 95 % | TEMPERATURE: 98 F | SYSTOLIC BLOOD PRESSURE: 151 MMHG

## 2023-08-04 VITALS
RESPIRATION RATE: 16 BRPM | OXYGEN SATURATION: 95 % | HEART RATE: 101 BPM | SYSTOLIC BLOOD PRESSURE: 145 MMHG | TEMPERATURE: 98 F

## 2023-08-04 DIAGNOSIS — Z90.710 ACQUIRED ABSENCE OF BOTH CERVIX AND UTERUS: Chronic | ICD-10-CM

## 2023-08-04 DIAGNOSIS — Z90.49 ACQUIRED ABSENCE OF OTHER SPECIFIED PARTS OF DIGESTIVE TRACT: Chronic | ICD-10-CM

## 2023-08-04 DIAGNOSIS — N13.30 UNSPECIFIED HYDRONEPHROSIS: ICD-10-CM

## 2023-08-04 PROCEDURE — 50431 NJX PX NFROSGRM &/URTRGRM: CPT | Mod: RT

## 2023-08-09 ENCOUNTER — RESULT REVIEW (OUTPATIENT)
Age: 83
End: 2023-08-09

## 2023-08-09 ENCOUNTER — NON-APPOINTMENT (OUTPATIENT)
Age: 83
End: 2023-08-09

## 2023-08-09 ENCOUNTER — APPOINTMENT (OUTPATIENT)
Dept: HEMATOLOGY ONCOLOGY | Facility: CLINIC | Age: 83
End: 2023-08-09

## 2023-08-09 ENCOUNTER — APPOINTMENT (OUTPATIENT)
Dept: INFUSION THERAPY | Facility: HOSPITAL | Age: 83
End: 2023-08-09

## 2023-08-09 LAB
ALBUMIN SERPL ELPH-MCNC: 3.7 G/DL — SIGNIFICANT CHANGE UP (ref 3.3–5)
ALP SERPL-CCNC: 83 U/L — SIGNIFICANT CHANGE UP (ref 40–120)
ALT FLD-CCNC: 20 U/L — SIGNIFICANT CHANGE UP (ref 10–45)
ANION GAP SERPL CALC-SCNC: 10 MMOL/L — SIGNIFICANT CHANGE UP (ref 5–17)
AST SERPL-CCNC: 43 U/L — HIGH (ref 10–40)
BASOPHILS # BLD AUTO: 0.12 K/UL — SIGNIFICANT CHANGE UP (ref 0–0.2)
BASOPHILS NFR BLD AUTO: 0.9 % — SIGNIFICANT CHANGE UP (ref 0–2)
BILIRUB SERPL-MCNC: 0.2 MG/DL — SIGNIFICANT CHANGE UP (ref 0.2–1.2)
BUN SERPL-MCNC: 11 MG/DL — SIGNIFICANT CHANGE UP (ref 7–23)
CALCIUM SERPL-MCNC: 9.9 MG/DL — SIGNIFICANT CHANGE UP (ref 8.4–10.5)
CANCER AG125 SERPL-ACNC: 38 U/ML — SIGNIFICANT CHANGE UP
CHLORIDE SERPL-SCNC: 99 MMOL/L — SIGNIFICANT CHANGE UP (ref 96–108)
CO2 SERPL-SCNC: 30 MMOL/L — SIGNIFICANT CHANGE UP (ref 22–31)
CREAT SERPL-MCNC: 1.6 MG/DL — HIGH (ref 0.5–1.3)
EGFR: 32 ML/MIN/1.73M2 — LOW
EOSINOPHIL # BLD AUTO: 0.14 K/UL — SIGNIFICANT CHANGE UP (ref 0–0.5)
EOSINOPHIL NFR BLD AUTO: 1.1 % — SIGNIFICANT CHANGE UP (ref 0–6)
GLUCOSE SERPL-MCNC: 105 MG/DL — HIGH (ref 70–99)
HCT VFR BLD CALC: 29 % — LOW (ref 34.5–45)
HGB BLD-MCNC: 9.3 G/DL — LOW (ref 11.5–15.5)
IMM GRANULOCYTES NFR BLD AUTO: 4.9 % — HIGH (ref 0–0.9)
INR BLD: 1.24 RATIO — HIGH (ref 0.85–1.18)
LUPUS ANTICOAGULANT PROFILE RESULT: SIGNIFICANT CHANGE UP
LYMPHOCYTES # BLD AUTO: 18 % — SIGNIFICANT CHANGE UP (ref 13–44)
LYMPHOCYTES # BLD AUTO: 2.35 K/UL — SIGNIFICANT CHANGE UP (ref 1–3.3)
MAGNESIUM SERPL-MCNC: 2 MG/DL — SIGNIFICANT CHANGE UP (ref 1.6–2.6)
MCHC RBC-ENTMCNC: 29.5 PG — SIGNIFICANT CHANGE UP (ref 27–34)
MCHC RBC-ENTMCNC: 32.1 G/DL — SIGNIFICANT CHANGE UP (ref 32–36)
MCV RBC AUTO: 92.1 FL — SIGNIFICANT CHANGE UP (ref 80–100)
MONOCYTES # BLD AUTO: 1.22 K/UL — HIGH (ref 0–0.9)
MONOCYTES NFR BLD AUTO: 9.4 % — SIGNIFICANT CHANGE UP (ref 2–14)
NEUTROPHILS # BLD AUTO: 8.56 K/UL — HIGH (ref 1.8–7.4)
NEUTROPHILS NFR BLD AUTO: 65.7 % — SIGNIFICANT CHANGE UP (ref 43–77)
NRBC # BLD: 0 /100 WBCS — SIGNIFICANT CHANGE UP (ref 0–0)
PLATELET # BLD AUTO: 405 K/UL — HIGH (ref 150–400)
POTASSIUM SERPL-MCNC: 3.3 MMOL/L — LOW (ref 3.5–5.3)
POTASSIUM SERPL-SCNC: 3.3 MMOL/L — LOW (ref 3.5–5.3)
PROT SERPL-MCNC: 6.7 G/DL — SIGNIFICANT CHANGE UP (ref 6–8.3)
PROTHROM AB SERPL-ACNC: 13.9 SEC — HIGH (ref 9.5–13)
RBC # BLD: 3.15 M/UL — LOW (ref 3.8–5.2)
RBC # FLD: 17.6 % — HIGH (ref 10.3–14.5)
SODIUM SERPL-SCNC: 139 MMOL/L — SIGNIFICANT CHANGE UP (ref 135–145)
WBC # BLD: 13.03 K/UL — HIGH (ref 3.8–10.5)
WBC # FLD AUTO: 13.03 K/UL — HIGH (ref 3.8–10.5)

## 2023-08-10 DIAGNOSIS — N17.9 ACUTE KIDNEY FAILURE, UNSPECIFIED: ICD-10-CM

## 2023-08-10 DIAGNOSIS — Z46.6 ENCOUNTER FOR FITTING AND ADJUSTMENT OF URINARY DEVICE: ICD-10-CM

## 2023-08-10 LAB
AT III ACT/NOR PPP CHRO: 107 % — SIGNIFICANT CHANGE UP (ref 85–135)
PROT C ACT/NOR PPP: 133 % — SIGNIFICANT CHANGE UP (ref 74–150)

## 2023-08-11 ENCOUNTER — TRANSCRIPTION ENCOUNTER (OUTPATIENT)
Age: 83
End: 2023-08-11

## 2023-08-11 ENCOUNTER — NON-APPOINTMENT (OUTPATIENT)
Age: 83
End: 2023-08-11

## 2023-08-11 RX ORDER — OXYCODONE 5 MG/1
5 TABLET ORAL
Qty: 28 | Refills: 0 | Status: ACTIVE | COMMUNITY
Start: 2023-08-11 | End: 1900-01-01

## 2023-08-13 LAB — PROT S FREE PPP-ACNC: 97 % — SIGNIFICANT CHANGE UP (ref 63–140)

## 2023-08-14 PROBLEM — N17.9 KIDNEY FAILURE, ACUTE: Status: ACTIVE | Noted: 2023-06-30

## 2023-08-14 PROBLEM — E78.5 HYPERLIPIDEMIA, UNSPECIFIED HYPERLIPIDEMIA TYPE: Status: ACTIVE | Noted: 2023-06-30

## 2023-08-14 PROBLEM — R59.1 LYMPHADENOPATHY: Status: ACTIVE | Noted: 2023-08-14

## 2023-08-14 PROBLEM — C52 VAGINAL CANCER: Status: ACTIVE | Noted: 2023-08-14

## 2023-08-14 PROBLEM — K42.9 UMBILICAL HERNIA: Status: ACTIVE | Noted: 2023-07-10

## 2023-08-14 PROBLEM — I10 HYPERTENSION, UNSPECIFIED TYPE: Status: ACTIVE | Noted: 2023-06-30

## 2023-08-14 PROBLEM — N88.8 CERVICAL MASS: Status: ACTIVE | Noted: 2023-07-05

## 2023-08-14 PROBLEM — C57.9 GYNECOLOGIC CANCER: Status: ACTIVE | Noted: 2023-07-03

## 2023-08-21 ENCOUNTER — RESULT REVIEW (OUTPATIENT)
Age: 83
End: 2023-08-21

## 2023-08-21 ENCOUNTER — APPOINTMENT (OUTPATIENT)
Dept: HEMATOLOGY ONCOLOGY | Facility: CLINIC | Age: 83
End: 2023-08-21
Payer: MEDICARE

## 2023-08-21 ENCOUNTER — APPOINTMENT (OUTPATIENT)
Dept: INFUSION THERAPY | Facility: HOSPITAL | Age: 83
End: 2023-08-21

## 2023-08-21 VITALS
BODY MASS INDEX: 31.55 KG/M2 | RESPIRATION RATE: 16 BRPM | WEIGHT: 166.89 LBS | OXYGEN SATURATION: 97 % | TEMPERATURE: 98.4 F | HEART RATE: 98 BPM | SYSTOLIC BLOOD PRESSURE: 114 MMHG | DIASTOLIC BLOOD PRESSURE: 71 MMHG

## 2023-08-21 LAB
ALBUMIN SERPL ELPH-MCNC: 3.7 G/DL — SIGNIFICANT CHANGE UP (ref 3.3–5)
ALP SERPL-CCNC: 73 U/L — SIGNIFICANT CHANGE UP (ref 40–120)
ALT FLD-CCNC: 17 U/L — SIGNIFICANT CHANGE UP (ref 10–45)
ANION GAP SERPL CALC-SCNC: 11 MMOL/L — SIGNIFICANT CHANGE UP (ref 5–17)
AST SERPL-CCNC: 32 U/L — SIGNIFICANT CHANGE UP (ref 10–40)
BILIRUB SERPL-MCNC: 0.2 MG/DL — SIGNIFICANT CHANGE UP (ref 0.2–1.2)
BUN SERPL-MCNC: 12 MG/DL — SIGNIFICANT CHANGE UP (ref 7–23)
CALCIUM SERPL-MCNC: 9.2 MG/DL — SIGNIFICANT CHANGE UP (ref 8.4–10.5)
CHLORIDE SERPL-SCNC: 102 MMOL/L — SIGNIFICANT CHANGE UP (ref 96–108)
CO2 SERPL-SCNC: 29 MMOL/L — SIGNIFICANT CHANGE UP (ref 22–31)
CREAT SERPL-MCNC: 1.42 MG/DL — HIGH (ref 0.5–1.3)
EGFR: 37 ML/MIN/1.73M2 — LOW
GLUCOSE SERPL-MCNC: 99 MG/DL — SIGNIFICANT CHANGE UP (ref 70–99)
HCT VFR BLD CALC: 26.5 % — LOW (ref 34.5–45)
HGB BLD-MCNC: 8.4 G/DL — LOW (ref 11.5–15.5)
MAGNESIUM SERPL-MCNC: 2 MG/DL — SIGNIFICANT CHANGE UP (ref 1.6–2.6)
MCHC RBC-ENTMCNC: 29.9 PG — SIGNIFICANT CHANGE UP (ref 27–34)
MCHC RBC-ENTMCNC: 31.7 G/DL — LOW (ref 32–36)
MCV RBC AUTO: 94.3 FL — SIGNIFICANT CHANGE UP (ref 80–100)
PLATELET # BLD AUTO: 269 K/UL — SIGNIFICANT CHANGE UP (ref 150–400)
POTASSIUM SERPL-MCNC: 3.3 MMOL/L — LOW (ref 3.5–5.3)
POTASSIUM SERPL-SCNC: 3.3 MMOL/L — LOW (ref 3.5–5.3)
PROT SERPL-MCNC: 6.2 G/DL — SIGNIFICANT CHANGE UP (ref 6–8.3)
RBC # BLD: 2.81 M/UL — LOW (ref 3.8–5.2)
RBC # FLD: 17.2 % — HIGH (ref 10.3–14.5)
SODIUM SERPL-SCNC: 142 MMOL/L — SIGNIFICANT CHANGE UP (ref 135–145)
WBC # BLD: 3.72 K/UL — LOW (ref 3.8–10.5)
WBC # FLD AUTO: 3.72 K/UL — LOW (ref 3.8–10.5)

## 2023-08-21 PROCEDURE — 99214 OFFICE O/P EST MOD 30 MIN: CPT

## 2023-08-22 ENCOUNTER — OUTPATIENT (OUTPATIENT)
Dept: OUTPATIENT SERVICES | Facility: HOSPITAL | Age: 83
LOS: 1 days | Discharge: HOME | End: 2023-08-22

## 2023-08-22 ENCOUNTER — NON-APPOINTMENT (OUTPATIENT)
Age: 83
End: 2023-08-22

## 2023-08-22 DIAGNOSIS — Z90.710 ACQUIRED ABSENCE OF BOTH CERVIX AND UTERUS: Chronic | ICD-10-CM

## 2023-08-22 DIAGNOSIS — C52 MALIGNANT NEOPLASM OF VAGINA: ICD-10-CM

## 2023-08-22 DIAGNOSIS — Z90.49 ACQUIRED ABSENCE OF OTHER SPECIFIED PARTS OF DIGESTIVE TRACT: Chronic | ICD-10-CM

## 2023-08-22 LAB
CANCER AG125 SERPL-ACNC: 24 U/ML — SIGNIFICANT CHANGE UP
PHOSPHATE SERPL-MCNC: 3.3 MG/DL — SIGNIFICANT CHANGE UP (ref 2.5–4.5)

## 2023-08-22 RX ORDER — AMLODIPINE BESYLATE 5 MG/1
5 TABLET ORAL DAILY
Qty: 30 | Refills: 0 | Status: ACTIVE | COMMUNITY
Start: 2023-08-22 | End: 1900-01-01

## 2023-08-22 RX ORDER — AMLODIPINE BESYLATE 2.5 MG/1
2.5 TABLET ORAL DAILY
Qty: 30 | Refills: 0 | Status: ACTIVE | COMMUNITY
Start: 2023-08-22 | End: 1900-01-01

## 2023-08-25 ENCOUNTER — OUTPATIENT (OUTPATIENT)
Dept: OUTPATIENT SERVICES | Facility: HOSPITAL | Age: 83
LOS: 1 days | End: 2023-08-25
Payer: MEDICARE

## 2023-08-25 VITALS
OXYGEN SATURATION: 98 % | TEMPERATURE: 98 F | RESPIRATION RATE: 20 BRPM | HEART RATE: 94 BPM | DIASTOLIC BLOOD PRESSURE: 60 MMHG | SYSTOLIC BLOOD PRESSURE: 138 MMHG

## 2023-08-25 DIAGNOSIS — Z90.49 ACQUIRED ABSENCE OF OTHER SPECIFIED PARTS OF DIGESTIVE TRACT: Chronic | ICD-10-CM

## 2023-08-25 DIAGNOSIS — N13.30 UNSPECIFIED HYDRONEPHROSIS: ICD-10-CM

## 2023-08-25 DIAGNOSIS — Z90.710 ACQUIRED ABSENCE OF BOTH CERVIX AND UTERUS: Chronic | ICD-10-CM

## 2023-08-25 PROCEDURE — 50435 EXCHANGE NEPHROSTOMY CATH: CPT | Mod: RT

## 2023-08-29 ENCOUNTER — NON-APPOINTMENT (OUTPATIENT)
Age: 83
End: 2023-08-29

## 2023-08-30 ENCOUNTER — RESULT REVIEW (OUTPATIENT)
Age: 83
End: 2023-08-30

## 2023-08-30 ENCOUNTER — APPOINTMENT (OUTPATIENT)
Dept: HEMATOLOGY ONCOLOGY | Facility: CLINIC | Age: 83
End: 2023-08-30

## 2023-08-30 ENCOUNTER — APPOINTMENT (OUTPATIENT)
Dept: INFUSION THERAPY | Facility: HOSPITAL | Age: 83
End: 2023-08-30

## 2023-08-30 DIAGNOSIS — N13.9 OBSTRUCTIVE AND REFLUX UROPATHY, UNSPECIFIED: ICD-10-CM

## 2023-08-30 DIAGNOSIS — Z46.6 ENCOUNTER FOR FITTING AND ADJUSTMENT OF URINARY DEVICE: ICD-10-CM

## 2023-08-30 DIAGNOSIS — N13.30 UNSPECIFIED HYDRONEPHROSIS: ICD-10-CM

## 2023-08-30 DIAGNOSIS — R19.00 INTRA-ABDOMINAL AND PELVIC SWELLING, MASS AND LUMP, UNSPECIFIED SITE: ICD-10-CM

## 2023-08-30 LAB
ANION GAP SERPL CALC-SCNC: 11 MMOL/L — SIGNIFICANT CHANGE UP (ref 5–17)
BASOPHILS # BLD AUTO: 0.05 K/UL — SIGNIFICANT CHANGE UP (ref 0–0.2)
BASOPHILS NFR BLD AUTO: 0.8 % — SIGNIFICANT CHANGE UP (ref 0–2)
BUN SERPL-MCNC: 10 MG/DL — SIGNIFICANT CHANGE UP (ref 7–23)
CALCIUM SERPL-MCNC: 9.4 MG/DL — SIGNIFICANT CHANGE UP (ref 8.4–10.5)
CHLORIDE SERPL-SCNC: 105 MMOL/L — SIGNIFICANT CHANGE UP (ref 96–108)
CO2 SERPL-SCNC: 28 MMOL/L — SIGNIFICANT CHANGE UP (ref 22–31)
CREAT SERPL-MCNC: 1.3 MG/DL — SIGNIFICANT CHANGE UP (ref 0.5–1.3)
EGFR: 41 ML/MIN/1.73M2 — LOW
EOSINOPHIL # BLD AUTO: 0.06 K/UL — SIGNIFICANT CHANGE UP (ref 0–0.5)
EOSINOPHIL NFR BLD AUTO: 1 % — SIGNIFICANT CHANGE UP (ref 0–6)
GLUCOSE SERPL-MCNC: 93 MG/DL — SIGNIFICANT CHANGE UP (ref 70–99)
HCT VFR BLD CALC: 29.5 % — LOW (ref 34.5–45)
HGB BLD-MCNC: 9.6 G/DL — LOW (ref 11.5–15.5)
IMM GRANULOCYTES NFR BLD AUTO: 1.1 % — HIGH (ref 0–0.9)
LYMPHOCYTES # BLD AUTO: 1.74 K/UL — SIGNIFICANT CHANGE UP (ref 1–3.3)
LYMPHOCYTES # BLD AUTO: 27.6 % — SIGNIFICANT CHANGE UP (ref 13–44)
MCHC RBC-ENTMCNC: 30.5 PG — SIGNIFICANT CHANGE UP (ref 27–34)
MCHC RBC-ENTMCNC: 32.5 G/DL — SIGNIFICANT CHANGE UP (ref 32–36)
MCV RBC AUTO: 93.7 FL — SIGNIFICANT CHANGE UP (ref 80–100)
MONOCYTES # BLD AUTO: 0.63 K/UL — SIGNIFICANT CHANGE UP (ref 0–0.9)
MONOCYTES NFR BLD AUTO: 10 % — SIGNIFICANT CHANGE UP (ref 2–14)
NEUTROPHILS # BLD AUTO: 3.76 K/UL — SIGNIFICANT CHANGE UP (ref 1.8–7.4)
NEUTROPHILS NFR BLD AUTO: 59.5 % — SIGNIFICANT CHANGE UP (ref 43–77)
NRBC # BLD: 0 /100 WBCS — SIGNIFICANT CHANGE UP (ref 0–0)
PLATELET # BLD AUTO: 233 K/UL — SIGNIFICANT CHANGE UP (ref 150–400)
POTASSIUM SERPL-MCNC: 3.1 MMOL/L — LOW (ref 3.5–5.3)
POTASSIUM SERPL-SCNC: 3.1 MMOL/L — LOW (ref 3.5–5.3)
RBC # BLD: 3.15 M/UL — LOW (ref 3.8–5.2)
RBC # FLD: 16.5 % — HIGH (ref 10.3–14.5)
SODIUM SERPL-SCNC: 143 MMOL/L — SIGNIFICANT CHANGE UP (ref 135–145)
WBC # BLD: 6.31 K/UL — SIGNIFICANT CHANGE UP (ref 3.8–10.5)
WBC # FLD AUTO: 6.31 K/UL — SIGNIFICANT CHANGE UP (ref 3.8–10.5)

## 2023-08-30 RX ORDER — POTASSIUM CHLORIDE 1500 MG/1
20 TABLET, FILM COATED, EXTENDED RELEASE ORAL
Qty: 10 | Refills: 2 | Status: ACTIVE | COMMUNITY
Start: 2023-08-22 | End: 1900-01-01

## 2023-08-31 DIAGNOSIS — Z51.11 ENCOUNTER FOR ANTINEOPLASTIC CHEMOTHERAPY: ICD-10-CM

## 2023-08-31 DIAGNOSIS — C53.9 MALIGNANT NEOPLASM OF CERVIX UTERI, UNSPECIFIED: ICD-10-CM

## 2023-08-31 DIAGNOSIS — E86.0 DEHYDRATION: ICD-10-CM

## 2023-08-31 DIAGNOSIS — R11.2 NAUSEA WITH VOMITING, UNSPECIFIED: ICD-10-CM

## 2023-09-06 ENCOUNTER — RESULT REVIEW (OUTPATIENT)
Age: 83
End: 2023-09-06

## 2023-09-06 ENCOUNTER — APPOINTMENT (OUTPATIENT)
Dept: HEMATOLOGY ONCOLOGY | Facility: CLINIC | Age: 83
End: 2023-09-06
Payer: MEDICARE

## 2023-09-06 ENCOUNTER — APPOINTMENT (OUTPATIENT)
Dept: INFUSION THERAPY | Facility: HOSPITAL | Age: 83
End: 2023-09-06

## 2023-09-06 VITALS
BODY MASS INDEX: 31.43 KG/M2 | HEART RATE: 100 BPM | DIASTOLIC BLOOD PRESSURE: 77 MMHG | OXYGEN SATURATION: 98 % | RESPIRATION RATE: 17 BRPM | SYSTOLIC BLOOD PRESSURE: 119 MMHG | WEIGHT: 166.45 LBS | TEMPERATURE: 97.7 F | HEIGHT: 60.98 IN

## 2023-09-06 DIAGNOSIS — C53.9 MALIGNANT NEOPLASM OF CERVIX UTERI, UNSPECIFIED: ICD-10-CM

## 2023-09-06 LAB
ALBUMIN SERPL ELPH-MCNC: 3.9 G/DL — SIGNIFICANT CHANGE UP (ref 3.3–5)
ALP SERPL-CCNC: 62 U/L — SIGNIFICANT CHANGE UP (ref 40–120)
ALT FLD-CCNC: 22 U/L — SIGNIFICANT CHANGE UP (ref 10–45)
ANION GAP SERPL CALC-SCNC: 15 MMOL/L — SIGNIFICANT CHANGE UP (ref 5–17)
AST SERPL-CCNC: 33 U/L — SIGNIFICANT CHANGE UP (ref 10–40)
BILIRUB SERPL-MCNC: 0.2 MG/DL — SIGNIFICANT CHANGE UP (ref 0.2–1.2)
BUN SERPL-MCNC: 20 MG/DL — SIGNIFICANT CHANGE UP (ref 7–23)
CALCIUM SERPL-MCNC: 9.6 MG/DL — SIGNIFICANT CHANGE UP (ref 8.4–10.5)
CHLORIDE SERPL-SCNC: 103 MMOL/L — SIGNIFICANT CHANGE UP (ref 96–108)
CO2 SERPL-SCNC: 25 MMOL/L — SIGNIFICANT CHANGE UP (ref 22–31)
CREAT SERPL-MCNC: 1.35 MG/DL — HIGH (ref 0.5–1.3)
EGFR: 39 ML/MIN/1.73M2 — LOW
GLUCOSE SERPL-MCNC: 111 MG/DL — HIGH (ref 70–99)
HCT VFR BLD CALC: 30.4 % — LOW (ref 34.5–45)
HGB BLD-MCNC: 9.6 G/DL — LOW (ref 11.5–15.5)
MAGNESIUM SERPL-MCNC: 1.8 MG/DL — SIGNIFICANT CHANGE UP (ref 1.6–2.6)
MCHC RBC-ENTMCNC: 30.4 PG — SIGNIFICANT CHANGE UP (ref 27–34)
MCHC RBC-ENTMCNC: 31.6 G/DL — LOW (ref 32–36)
MCV RBC AUTO: 96.2 FL — SIGNIFICANT CHANGE UP (ref 80–100)
PLATELET # BLD AUTO: 173 K/UL — SIGNIFICANT CHANGE UP (ref 150–400)
POTASSIUM SERPL-MCNC: 4.4 MMOL/L — SIGNIFICANT CHANGE UP (ref 3.5–5.3)
POTASSIUM SERPL-SCNC: 4.4 MMOL/L — SIGNIFICANT CHANGE UP (ref 3.5–5.3)
PROT SERPL-MCNC: 6.5 G/DL — SIGNIFICANT CHANGE UP (ref 6–8.3)
RBC # BLD: 3.16 M/UL — LOW (ref 3.8–5.2)
RBC # FLD: 15.6 % — HIGH (ref 10.3–14.5)
SODIUM SERPL-SCNC: 142 MMOL/L — SIGNIFICANT CHANGE UP (ref 135–145)
WBC # BLD: 6.61 K/UL — SIGNIFICANT CHANGE UP (ref 3.8–10.5)
WBC # FLD AUTO: 6.61 K/UL — SIGNIFICANT CHANGE UP (ref 3.8–10.5)

## 2023-09-06 PROCEDURE — 99214 OFFICE O/P EST MOD 30 MIN: CPT

## 2023-09-06 NOTE — HISTORY OF PRESENT ILLNESS
[de-identified] : 83 year old female with recurrent squamous cell carcinoma vagina/cervical presents today with her daughter for consideration for neoadjuvant chemotherapy.  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  on 6/6/23 patient went to [facility] ED for nausea and diarrhea, including with melena, for ~1 week. Found to be in acute renal failure 2/2 an obstructing soft tissue mass inseparable from the cervical remnant on CT scan. Patient is s/p MILVIA, BSO in 2008 when she was initially diagnosed with cervical cancer. Patient was discussed at [facility] tumor board on 6/14/23 and recommended chemotherapy with palliative pelvic RT.   Patient was seen by Dr Rahman and PET scan was performed on 7/2/23 revealing FDG-avid soft tissue mass at vaginal cuff extending into the vagina,  FDG avid retroperitoneal and pelvic lymph nodes consistent with metastatic disease.  Had recent admission on 7/4/23 at [facility] for weakness, found to have UTI. Was discharged on 7/9/23 after course of IV abx. R nephrostomy tube in place due to hydronephrosis.  PATH 6/7/23 1-Vaginal mass biopsy Final Diagnosis 1.   Vaginal mass biopsy - Superficial fragments of squamous cell carcinoma  CT A/P 7/4/23 LOWER CHEST: Trace bilateral pleural effusions, decreased from prior.  Bibasilar atelectasis. 6 mm right middle lobe peripheral opacity not seen  on prior, may represent intrapulmonary lymph node versus atelectasis.  Left atrial enlargement. Aortic and coronary artery calcifications. LIVER: Within normal limits. BILE DUCTS: Normal caliber. GALLBLADDER: Cholecystectomy. SPLEEN: Within normal limits. PANCREAS: Cystic lesion in the pancreatic body measuring 1 cm is again  seen (301-38). No pancreatic duct dilation. ADRENALS: Within normal limits. KIDNEYS/URETERS: Status post right nephrostomy tube placement improved  with resolution of right hydroureteronephrosis. Mild left-sided  hydroureteronephrosis to the level of the UVJ is unchanged. Left sided  perinephric edema is again seen. Left renal cyst. BLADDER: Minimally distended. REPRODUCTIVE ORGANS: Status post hysterectomy with soft tissue mass  arising from the right aspect of the cervical remnant measuring up to 4.6  cm. BOWEL: No bowel obstruction. Appendix is not visualized. No evidence of  inflammation in the pericecal region. Colonic diverticulosis. PERITONEUM: No ascites. VESSELS: Atherosclerotic changes. RETROPERITONEUM/LYMPH NODES: Stable left para-aortic and bilateral pelvic  lymphadenopathy, largest measuring 2.5 cm (301-95).). ABDOMINAL WALL: Postsurgical changes. BONES: Degenerative changes. Mild anterolisthesis of L4 on L5. IMPRESSION: No bowel obstruction. Resolution of right-sided hydroureteronephrosis status post right  nephrostomy tube placement. Unchanged mild left hydroureteronephrosis.  Redemonstration of mass emanating from the vaginal cuff with pelvic and  retroperitoneal lymphadenopathy unchanged --- End of Report---   PET 7/2/23 HEAD/NECK: Physiologic FDG activity in visualized brain, head, and neck.  THORAX: Mildly FDG avid right axillary 0.8 cm lymph node, SUV 2.5 (image  77). Nonavid left supraclavicular lymph node is decreased in size from CT  chest 6/7/2023 measuring 0.9 x 0.6 cm, previously 1.5 x 0.9 cm (image  56). Nonavid enlarged subcarinal lymph node is unchanged in size,  measuring up to 1.5 cm in short axis (image 90). More superiorly within  the subcarinal region, there is a focus of increased FDG uptake without  discrete CT correlate is likely corresponding to subcarinal lymph node  with SUV 3.3 (image 83). Right-sided dialysis catheter with tip in  superior cavoatrial junction.  LUNGS: Linear focus of increased FDG avidity in the left lung base (SUV  3.9 image 99) likely representing reexpansion. Bibasilar atelectasis,  decreased from prior. Nonavid linear atelectasis versus scarring in the  right middle lobe. Previously described 5 mm nodules within the right  upper and right lower lobe are not well evaluated on current PET/CT due  to differences in technique.  PLEURA/PERICARDIUM: No abnormal FDG activity. Trace bilateral pleural  effusions, decreased since 6/7/2023. New small non-FDG avid pericardial  effusion.  HEPATOBILIARY/PANCREAS: Physiologic FDG activity.  For reference, normal  liver demonstrates SUV mean 3.5. Cholecystectomy. A 1 cm cystic lesion in  the pancreas is photopenic (image 135).  SPLEEN: Physiologic FDG activity. Normal in size.  ADRENAL GLANDS: No abnormal FDG activity. No nodule.  KIDNEYS/URINARY BLADDER: Physiologic excreted FDG activity. Interval  placement of right-sided nephrostomy with pigtail in the renal pelvis. No  hydronephrosis. Unchanged mild left hydronephrosis.  REPRODUCTIVE ORGANS: Hysterectomy. Unchanged appearance of lobular soft  tissue mass at the level of the vaginal cuff, extending into the vagina,  demonstrating markedly FDG uptake with SUV 18.9 (image 207).  ABDOMINOPELVIC LYMPH NODES/RETROPERITONEUM: Multiple FDG avid enlarged  lymph nodes in the retroperitoneum and pelvis, unchanged in size when  compared to CT dated 6/6/2023. For reference: Left common iliac lymph node measuring up to  2.1 x 1.9 cm, SUV 14.4  (image 176). Right external iliac lymph node measuring up to 2.9 x 2.4 cm, SUV 15.1  (image 202). Left obturator conglomerate of lymph nodes measuring up to 2.7 x 1.3 cm,  SUV 9.6 (image 197).  ESOPHAGUS/STOMACH/BOWEL/PERITONEUM/MESENTERY: No abnormal FDG activity.  VESSELS: Atherosclerotic changes. No aneurysm.  BONES/SOFT TISSUES: Minimally avid arthritic type changes at the right  greater than left glenohumeral joints and right greater than left hips.  Degenerative changes of the spine. Small fat-containing umbilical hernia.  IMPRESSION: Abnormal skull-to-thigh FDG-PET/CT scan. 1. FDG avid lobular soft tissue mass at the level of the vaginal cuff,  extending into the vagina, is unchanged in size when compared to recent  CT abdomen and pelvis and is consistent with patient's biopsy-proven  squamous cell carcinoma. 2. FDG avid retroperitoneal and pelvic lymph nodes consistent with  metastatic disease. 3. Mildly FDG avid right axillary and subcarinal lymph nodes are  nonspecific. 4. New small non-FDG avid pericardial effusion. 5. Interval placement of right-sided nephrostomy tube with resolved  hydronephrosis. Unchanged left mild hydronephrosis.  Today, patient is feeling well overall. She states that she usually feels well for 2-3 days at a time and then starts to feel very fatigued. Has decreased appetite, is eating very small amounts of food each day. Has nephro shakes each day as supplementation. She reports losing 18 lbs in the last month. Reports frequent constipation, but relates constipation to not eating much throughout the day. Reports dry cough x 5 weeks.  Jaqueline's daughters live in [place] and they are interested in transferring care to [place] eventually.   Medications: pravastatin 20mg, caltrate, amlodipine 5mg,   Allergies: NKDA  Med Hx: ESRD (Tues/Thurs/Sat HD), HTN, cervical cancer 2008 [facility]d in [place]  FamHx: granddaughter Hodgkins Lymphoma   Social: Lives with grandson  [FreeTextEntry1] : Carboplatin and Taxol s/p first cycle [de-identified] : Patient is here with her daughter after completing first cycle of chemo. She had arthralgias for the first week after chemo, got minimal relief from Tylenol, pain has resolved.  She had diarrhea starting 2 days after chemo and got relief from Immodium. She had weakness, fatigue and decreased appetite, lost about 4kg.  Appetite has improved over the past few days/. She had some nausea, no vomiting, got relief from anti-emetics. She has intermittent neuropathy of feet described as "sandpaper" to bottom of feet, intermittent, occurs at nighttime while resting. She reports decreased urinary output from nephrostomy over past 2 weeks with increased urination. Denies fever, chills, chest pain, SOB, abdominal pain, constipation. She had mediport placed last Friday. Patient and daughter would like to transfer her care to [place], possibly after second cycle of chemo.  She has not established care with anyone in [place] yet.

## 2023-09-06 NOTE — REVIEW OF SYSTEMS
[Fever] : no fever [Chills] : no chills [Night Sweats] : no night sweats [Fatigue] : fatigue [Recent Change In Weight] : ~T recent weight change [Wheezing] : no wheezing [Shortness Of Breath] : no shortness of breath [Cough] : cough [SOB on Exertion] : no shortness of breath during exertion [Joint Pain] : joint pain [Negative] : Allergic/Immunologic [FreeTextEntry2] : decreased appetite [FreeTextEntry6] : dry cough [FreeTextEntry7] : diarrhea for 2 days after chemo - resolved [FreeTextEntry8] : R nephrostomy tube - decreased output [de-identified] : intermittent neuropathy to b/l feet

## 2023-09-07 LAB — CANCER AG125 SERPL-ACNC: 22 U/ML — SIGNIFICANT CHANGE UP

## 2023-09-11 ENCOUNTER — APPOINTMENT (OUTPATIENT)
Dept: INFUSION THERAPY | Facility: HOSPITAL | Age: 83
End: 2023-09-11

## 2023-09-11 ENCOUNTER — APPOINTMENT (OUTPATIENT)
Dept: HEMATOLOGY ONCOLOGY | Facility: CLINIC | Age: 83
End: 2023-09-11

## 2023-09-19 ENCOUNTER — RX RENEWAL (OUTPATIENT)
Age: 83
End: 2023-09-19

## 2023-09-19 ENCOUNTER — TRANSCRIPTION ENCOUNTER (OUTPATIENT)
Age: 83
End: 2023-09-19

## 2023-09-19 RX ORDER — PRAVASTATIN SODIUM 20 MG/1
20 TABLET ORAL DAILY
Qty: 30 | Refills: 0 | Status: ACTIVE | COMMUNITY
Start: 2023-06-30 | End: 1900-01-01

## 2023-09-20 ENCOUNTER — TRANSCRIPTION ENCOUNTER (OUTPATIENT)
Age: 83
End: 2023-09-20

## 2023-09-24 PROBLEM — C53.9 SQUAMOUS CELL CARCINOMA OF CERVIX: Status: ACTIVE | Noted: 2023-06-14

## 2023-09-25 ENCOUNTER — TRANSCRIPTION ENCOUNTER (OUTPATIENT)
Age: 83
End: 2023-09-25

## 2023-10-03 ENCOUNTER — TRANSCRIPTION ENCOUNTER (OUTPATIENT)
Age: 83
End: 2023-10-03

## 2024-01-23 NOTE — PROGRESS NOTE ADULT - PROBLEM SELECTOR PROBLEM 7
Late Note Entry     Patient noted to be hyperkalemic on AM labs with K 6.3 not hemolyzed, despite improving MICHELLE.   EKG done showing NSR @ 100bpm, QTc 456, no peaked T waves noted, < 1mm MONTEZ V1-V4 and TWI I, avL however these are unchanged findings from ED EKG and EKG without any new changes.   IV regular Insulin 5u/D50 and lokelma given.   Repeat labs checked and now much improved; K is 4.8.  Patient currently stable. Will continue to monitor closely.  Above events and plan discussed with attending. Late Note Entry     Patient noted to be hyperkalemic on AM labs with K 6.3 not hemolyzed, despite improving MICHELLE.   EKG done showing NSR @ 100bpm, QTc 456, no peaked T waves noted, and no other new findings when compared to EKG from ED (unchanged).   IV regular Insulin 5u/D50 and lokelma given.   Repeat labs checked and now much improved; K is 4.8.  Patient currently stable. Will continue to monitor closely.  Above events and plan discussed with attending. Hypothyroidism

## 2024-04-19 NOTE — PATIENT PROFILE ADULT - FUNCTIONAL ASSESSMENT - DAILY ACTIVITY SCORE.
04/19/24 1157   Patient Observation   Pulse (!) 128   Respirations 23   SpO2 94 %   Observations Oet leak test . pt sxd via oet and orally.  balloon deflated insp vt 700 given and exh vt 350 measured in return ; with audible airflow heard orally.  balloon reinflated . pt placed back on psv   Ventilator Settings   FiO2  40 %   Vt (Set, mL) 700 mL   Resp Rate (Set) 14 bpm   PEEP/CPAP (cmH2O) 8   Vent Patient Data (Readings)   Vt (Measured) 372 mL   Peak Inspiratory Pressure (cmH2O) 32 cmH2O   Rate Measured 36 br/min   Minute Volume (L/min) 8.56 Liters   Mean Airway Pressure (cmH2O) 17 cmH20   Plateau Pressure (cm H2O) 22 cm H2O   Driving Pressure 14   I:E Ratio 1:2.40   Vent Alarm Settings   High Pressure (cmH2O) 40 cmH2O   Low Exhaled Vt (ml) 0 mL        24

## 2025-04-01 NOTE — PATIENT PROFILE ADULT - DO YOU FEEL LIKE HURTING YOURSELF OR OTHERS?
[FreeTextEntry1] : 28 yo  h/o of PCOS and infertility -  advised blood work, schedule US, and schedule partner's evaluation. I spent the time noted on the day of this patient encounter preparing for, providing and documenting the above service. I have counseled and educated the patient on the differential, workup, disease course, and treatment/management plan. Education was provided to the patient during this encounter. All questions and concerns were answered and addressed in detail.       no

## 2025-04-21 NOTE — ED PROVIDER NOTE - CONSTITUTIONAL [+], MLM
Called patient to reschedule today's appointment due to being on the nurse schedule. Patient did not answer, left a voicemail to call the office at [phone].     Luis does have some openings today if we can offer that to patient if she returns call.    subjective fever

## 2025-06-21 ENCOUNTER — EMERGENCY (EMERGENCY)
Facility: HOSPITAL | Age: 85
LOS: 1 days | End: 2025-06-21
Attending: EMERGENCY MEDICINE | Admitting: EMERGENCY MEDICINE
Payer: MEDICARE

## 2025-06-21 VITALS
HEART RATE: 79 BPM | TEMPERATURE: 98 F | OXYGEN SATURATION: 98 % | SYSTOLIC BLOOD PRESSURE: 137 MMHG | RESPIRATION RATE: 15 BRPM | DIASTOLIC BLOOD PRESSURE: 65 MMHG

## 2025-06-21 VITALS
RESPIRATION RATE: 16 BRPM | DIASTOLIC BLOOD PRESSURE: 73 MMHG | WEIGHT: 147.93 LBS | OXYGEN SATURATION: 95 % | TEMPERATURE: 98 F | SYSTOLIC BLOOD PRESSURE: 137 MMHG | HEIGHT: 61 IN | HEART RATE: 95 BPM

## 2025-06-21 DIAGNOSIS — Z90.710 ACQUIRED ABSENCE OF BOTH CERVIX AND UTERUS: Chronic | ICD-10-CM

## 2025-06-21 DIAGNOSIS — Z90.49 ACQUIRED ABSENCE OF OTHER SPECIFIED PARTS OF DIGESTIVE TRACT: Chronic | ICD-10-CM

## 2025-06-21 LAB
ALBUMIN SERPL ELPH-MCNC: 3.3 G/DL — SIGNIFICANT CHANGE UP (ref 3.3–5)
ALP SERPL-CCNC: 87 U/L — SIGNIFICANT CHANGE UP (ref 40–120)
ALT FLD-CCNC: 12 U/L — SIGNIFICANT CHANGE UP (ref 4–33)
ANION GAP SERPL CALC-SCNC: 7 MMOL/L — SIGNIFICANT CHANGE UP (ref 7–14)
APTT BLD: 31.3 SEC — SIGNIFICANT CHANGE UP (ref 26.1–36.8)
AST SERPL-CCNC: 22 U/L — SIGNIFICANT CHANGE UP (ref 4–32)
BASOPHILS # BLD AUTO: 0.03 K/UL — SIGNIFICANT CHANGE UP (ref 0–0.2)
BASOPHILS NFR BLD AUTO: 0.7 % — SIGNIFICANT CHANGE UP (ref 0–2)
BILIRUB SERPL-MCNC: 0.3 MG/DL — SIGNIFICANT CHANGE UP (ref 0.2–1.2)
BUN SERPL-MCNC: 30 MG/DL — HIGH (ref 7–23)
CALCIUM SERPL-MCNC: 9.4 MG/DL — SIGNIFICANT CHANGE UP (ref 8.4–10.5)
CHLORIDE SERPL-SCNC: 104 MMOL/L — SIGNIFICANT CHANGE UP (ref 98–107)
CO2 SERPL-SCNC: 31 MMOL/L — SIGNIFICANT CHANGE UP (ref 22–31)
CREAT SERPL-MCNC: 1.47 MG/DL — HIGH (ref 0.5–1.3)
EGFR: 35 ML/MIN/1.73M2 — LOW
EGFR: 35 ML/MIN/1.73M2 — LOW
EOSINOPHIL # BLD AUTO: 0.19 K/UL — SIGNIFICANT CHANGE UP (ref 0–0.5)
EOSINOPHIL NFR BLD AUTO: 4.6 % — SIGNIFICANT CHANGE UP (ref 0–6)
GLUCOSE SERPL-MCNC: 88 MG/DL — SIGNIFICANT CHANGE UP (ref 70–99)
HCT VFR BLD CALC: 26.7 % — LOW (ref 34.5–45)
HGB BLD-MCNC: 8.2 G/DL — LOW (ref 11.5–15.5)
IMM GRANULOCYTES # BLD AUTO: 0.02 K/UL — SIGNIFICANT CHANGE UP (ref 0–0.07)
IMM GRANULOCYTES NFR BLD AUTO: 0.5 % — SIGNIFICANT CHANGE UP (ref 0–0.9)
INR BLD: 1.9 RATIO — HIGH (ref 0.85–1.16)
LYMPHOCYTES # BLD AUTO: 1.08 K/UL — SIGNIFICANT CHANGE UP (ref 1–3.3)
LYMPHOCYTES NFR BLD AUTO: 25.9 % — SIGNIFICANT CHANGE UP (ref 13–44)
MCHC RBC-ENTMCNC: 29.7 PG — SIGNIFICANT CHANGE UP (ref 27–34)
MCHC RBC-ENTMCNC: 30.7 G/DL — LOW (ref 32–36)
MCV RBC AUTO: 96.7 FL — SIGNIFICANT CHANGE UP (ref 80–100)
MONOCYTES # BLD AUTO: 0.57 K/UL — SIGNIFICANT CHANGE UP (ref 0–0.9)
MONOCYTES NFR BLD AUTO: 13.7 % — SIGNIFICANT CHANGE UP (ref 2–14)
NEUTROPHILS # BLD AUTO: 2.28 K/UL — SIGNIFICANT CHANGE UP (ref 1.8–7.4)
NEUTROPHILS NFR BLD AUTO: 54.6 % — SIGNIFICANT CHANGE UP (ref 43–77)
NRBC # BLD AUTO: 0 K/UL — SIGNIFICANT CHANGE UP (ref 0–0)
NRBC # FLD: 0 K/UL — SIGNIFICANT CHANGE UP (ref 0–0)
NRBC BLD AUTO-RTO: 0 /100 WBCS — SIGNIFICANT CHANGE UP (ref 0–0)
PLATELET # BLD AUTO: 268 K/UL — SIGNIFICANT CHANGE UP (ref 150–400)
PMV BLD: 9.3 FL — SIGNIFICANT CHANGE UP (ref 7–13)
POTASSIUM SERPL-MCNC: 3.7 MMOL/L — SIGNIFICANT CHANGE UP (ref 3.5–5.3)
POTASSIUM SERPL-SCNC: 3.7 MMOL/L — SIGNIFICANT CHANGE UP (ref 3.5–5.3)
PROT SERPL-MCNC: 5.7 G/DL — LOW (ref 6–8.3)
PROTHROM AB SERPL-ACNC: 22.5 SEC — HIGH (ref 9.9–13.4)
RBC # BLD: 2.76 M/UL — LOW (ref 3.8–5.2)
RBC # FLD: 15.4 % — HIGH (ref 10.3–14.5)
SODIUM SERPL-SCNC: 142 MMOL/L — SIGNIFICANT CHANGE UP (ref 135–145)
WBC # BLD: 4.17 K/UL — SIGNIFICANT CHANGE UP (ref 3.8–10.5)
WBC # FLD AUTO: 4.17 K/UL — SIGNIFICANT CHANGE UP (ref 3.8–10.5)

## 2025-06-21 PROCEDURE — 70498 CT ANGIOGRAPHY NECK: CPT | Mod: 26

## 2025-06-21 PROCEDURE — 70496 CT ANGIOGRAPHY HEAD: CPT | Mod: 26

## 2025-06-21 PROCEDURE — 99285 EMERGENCY DEPT VISIT HI MDM: CPT

## 2025-06-21 RX ORDER — HEPARIN SODIUM,PORCINE/NS/PF 20/20 ML
100 SYRINGE (ML) INTRAVENOUS ONCE
Refills: 0 | Status: DISCONTINUED | OUTPATIENT
Start: 2025-06-21 | End: 2025-06-21

## 2025-06-21 RX ORDER — HEPARIN SODIUM,PORCINE/NS/PF 20/20 ML
500 SYRINGE (ML) INTRAVENOUS ONCE
Refills: 0 | Status: COMPLETED | OUTPATIENT
Start: 2025-06-21 | End: 2025-06-21

## 2025-06-21 RX ADMIN — Medication 500 UNIT(S): at 21:22

## 2025-06-21 NOTE — ED ADULT NURSE REASSESSMENT NOTE - NS ED NURSE REASSESS COMMENT FT1
Pt with power port card noted. Left sided chest wall port accessed using sterile technique with 20G PowerPort. labs drawn and sent. pt updated on plan of care, awaiting CT. family at bedside

## 2025-06-21 NOTE — ED ADULT NURSE REASSESSMENT NOTE - NS ED NURSE REASSESS COMMENT FT1
pt remains at baseline mental status A&ox4, RR even unlabored completing full sentences. pt resting in stretcher comfortably at this time, no new complaints offered. L chest wall port remains accessed, +blood return noted. rpt VS as per flowsheet. stretcher lowest position siderails up safety measures in place. pending CT results at this time

## 2025-06-21 NOTE — ED ADULT TRIAGE NOTE - CHIEF COMPLAINT QUOTE
ambulatory with walker, c/o R "eye intermediate closing" for about 15 seconds 1 hour PTA. no further complaints. pt reports has happened in the past and was dx with TIA. hx: Cervical Ca, last chemo 6/17.

## 2025-06-21 NOTE — ED PROVIDER NOTE - NSFOLLOWUPINSTRUCTIONS_ED_ALL_ED_FT
- You were seen in the emergency department today for blurry vision.  Your CT and lab results did not have any concerning findings.  It is suspected that you may be experiencing a phenomenon called amaurosis fugax that may be due to retinal vessel disease per your history. It is best that you follow-up with ophthalmology to get further workup and assessment of this.  There are no emergent interventions today in the emergency department.  Your symptoms get worse please return.  Your vision was checked here and your physical exam did not have any concerning findings.    - Lab and imaging results, if performed, were discussed with you along with your discharge diagnosis    - Follow up with your doctor in 1 week - bring copies of your results if you were given. If you are supposed  to follow up with a specialist, please bring your results with you as well.   You were referred to ophthalmology and may receive a phone call for this appointment but if you do not a phone number you can call is listed below in this packet.    - Return to the ED for any new, worsening, or concerning symptoms to you    - Continue all prescribed medications.    - Take Tylenol as directed as needed for pain.     - Rest and keep yourself hydrated with fluids.

## 2025-06-21 NOTE — ED ADULT NURSE REASSESSMENT NOTE - NS ED NURSE REASSESS COMMENT FT1
Break RN: received report from ZHENG Edwards. pt A&Ox4 stable resting in bed in non acute distress. Respirations even and unlabored. MD Benedict and AMARILIS Butt at bedside for ultrasound. pending further orders at this time. family at bedside. Safety maintained.

## 2025-06-21 NOTE — ED ADULT NURSE NOTE - OBJECTIVE STATEMENT
pt c/o vision disturbance  felt like her eye was closing half  way for 15 sec/ vision returned/ pt awaits to see dada

## 2025-06-21 NOTE — ED ADULT NURSE NOTE - CHIEF COMPLAINT QUOTE
ambulatory with walker, c/o R "eye long term closing" for about 15 seconds 1 hour PTA. no further complaints. pt reports has happened in the past and was dx with TIA. hx: Cervical Ca, last chemo 6/17.

## 2025-06-21 NOTE — ED PROVIDER NOTE - NSFOLLOWUPCLINICS_GEN_ALL_ED_FT
[facility]  Ophthalmology  [address] [address]  Phone: [phone]  Fax:     [facility]  Ophthalmology  [address]  [place] [address]  Phone: [phone]  Fax:     [facility]  Ophthalmology  [address]  [place] [address]  Phone: [phone]  Fax:

## 2025-06-21 NOTE — ED PROVIDER NOTE - PATIENT PORTAL LINK FT
You can access the FollowMyHealth Patient Portal offered by [facility] by registering at the following website: [email or web address]. By joining [facility]’s FollowMyHealth portal, you will also be able to view your health information using other applications (apps) compatible with our system.

## 2025-06-21 NOTE — ED PROVIDER NOTE - OBJECTIVE STATEMENT
85yF w/pmhx cervical ca on chemo (last chemo 6/17), CVA on Eliquis, HTN, GERD presenting to the ED with right eye transient vision loss. Pt states today at approximately 1:15pm she had curtain like vision loss in the right eye that lasted 15 seconds. Pt reports her vision is currently back to baseline, reports similar symptoms when she had a stroke previously. Pt denies numbness, tingling, weakness, difficulty swallowing or speaking, dizziness, cp, sob, abd pain, n/v/d or any other concerns.

## 2025-06-21 NOTE — ED PROVIDER NOTE - ATTENDING APP SHARED VISIT CONTRIBUTION OF CARE
I performed a face-to-face evaluation of the patient and performed a history and physical examination. I agree with the history and physical examination. If this was a PA visit, I personally saw the patient with the PA and performed a substantive portion of the visit including all aspects of the medical decision making.    H/o CVAs (Eliquis). P/w transient painless R eye vision loss (now improved). No HA. Check US for retinal detachment or vitreous hemorrhage. If unremarkable, then CTA head/neck. I performed a face-to-face evaluation of the patient and performed a history and physical examination. I agree with the history and physical examination. If this was a PA visit, I personally saw the patient with the PA and performed a substantive portion of the visit including all aspects of the medical decision making.    H/o multiple small CVAs (Eliquis, ASA). Also, several episodes in the past of R eye transient superior vision darkening thought by Optho to be small retinal strokes. P/w transient painless R eye vision loss (now improved). POCUS: no retinal detachment or vitreous hemorrhage. No HA. Plan: CTA head/neck. If unremarkable, then dc w/ Ophtho f/u in [place] (lives in [facility]).

## 2025-06-21 NOTE — ED PROVIDER NOTE - PHYSICAL EXAMINATION
left chest wall port  Neuro: A&Ox3, PERRL, CN II-VII intact, EOMs smooth and intact, sensation intact and equal b/l, strength 5/5 in all extremities, normal finger to nose, normal rapid alternating movements  conjunctiva clear b/l

## 2025-06-21 NOTE — ED PROVIDER NOTE - CLINICAL SUMMARY MEDICAL DECISION MAKING FREE TEXT BOX
Marichuy: H/o CVAs (Eliquis). P/w transient painless R eye vision loss (now improved). No HA. Check US for retinal detachment or vitreous hemorrhage. If unremarkable, then CTA head/neck. Marichuy:H/o multiple small CVAs (Eliquis, ASA). Also, several episodes in the past of R eye transient superior vision darkening thought by Optho to be small retinal strokes. P/w transient painless R eye vision loss (now improved). POCUS: no retinal detachment or vitreous hemorrhage. No HA. Plan: CTA head/neck. If unremarkable, then dc w/ Ophtho f/u in [place] (lives in [facility]).